# Patient Record
Sex: FEMALE | Race: WHITE | NOT HISPANIC OR LATINO | Employment: FULL TIME | URBAN - METROPOLITAN AREA
[De-identification: names, ages, dates, MRNs, and addresses within clinical notes are randomized per-mention and may not be internally consistent; named-entity substitution may affect disease eponyms.]

---

## 2017-05-12 DIAGNOSIS — M54.9 SPINE PAIN: Primary | ICD-10-CM

## 2017-05-16 ENCOUNTER — TELEPHONE (OUTPATIENT)
Dept: ORTHOPEDICS | Facility: CLINIC | Age: 52
End: 2017-05-16

## 2017-05-16 NOTE — TELEPHONE ENCOUNTER
Spoke to pt regarding appt Wednesday 5/17/17 at 830a with Kim Ghosh PA-C. Pt was informed to arrive on the 2nd floor at 745a, then up to floor 5 to see Kim. Pt verbalized understanding. Phone number was provided for any further questions.    Zoraida MORALES

## 2017-05-17 ENCOUNTER — OFFICE VISIT (OUTPATIENT)
Dept: ORTHOPEDICS | Facility: CLINIC | Age: 52
End: 2017-05-17
Payer: COMMERCIAL

## 2017-05-17 ENCOUNTER — HOSPITAL ENCOUNTER (OUTPATIENT)
Dept: RADIOLOGY | Facility: HOSPITAL | Age: 52
Discharge: HOME | End: 2017-05-17
Attending: ORTHOPAEDIC SURGERY
Payer: COMMERCIAL

## 2017-05-17 VITALS
SYSTOLIC BLOOD PRESSURE: 123 MMHG | HEIGHT: 67 IN | BODY MASS INDEX: 30.27 KG/M2 | WEIGHT: 192.88 LBS | HEART RATE: 73 BPM | DIASTOLIC BLOOD PRESSURE: 87 MMHG

## 2017-05-17 DIAGNOSIS — M54.12 CERVICAL RADICULOPATHY: Primary | ICD-10-CM

## 2017-05-17 DIAGNOSIS — M54.16 LUMBAR RADICULOPATHY: ICD-10-CM

## 2017-05-17 DIAGNOSIS — M54.9 SPINE PAIN: ICD-10-CM

## 2017-05-17 PROCEDURE — 99999 PR PBB SHADOW E&M-EST. PATIENT-LVL III: CPT | Mod: PBBFAC,,, | Performed by: PHYSICIAN ASSISTANT

## 2017-05-17 PROCEDURE — 99204 OFFICE O/P NEW MOD 45 MIN: CPT | Mod: S$GLB,,, | Performed by: PHYSICIAN ASSISTANT

## 2017-05-17 PROCEDURE — 1160F RVW MEDS BY RX/DR IN RCRD: CPT | Mod: S$GLB,,, | Performed by: PHYSICIAN ASSISTANT

## 2017-05-17 PROCEDURE — 72100 X-RAY EXAM L-S SPINE 2/3 VWS: CPT | Mod: 26,,, | Performed by: RADIOLOGY

## 2017-05-17 PROCEDURE — 72120 X-RAY BEND ONLY L-S SPINE: CPT | Mod: TC

## 2017-05-17 PROCEDURE — 72050 X-RAY EXAM NECK SPINE 4/5VWS: CPT | Mod: 26,,, | Performed by: RADIOLOGY

## 2017-05-17 PROCEDURE — 72050 X-RAY EXAM NECK SPINE 4/5VWS: CPT | Mod: TC

## 2017-05-17 PROCEDURE — 72120 X-RAY BEND ONLY L-S SPINE: CPT | Mod: 26,,, | Performed by: RADIOLOGY

## 2017-05-17 RX ORDER — HYDROCODONE BITARTRATE AND ACETAMINOPHEN 10; 325 MG/1; MG/1
TABLET ORAL
COMMUNITY
Start: 2017-02-13 | End: 2020-05-05

## 2017-05-17 RX ORDER — DICLOFENAC SODIUM 75 MG/1
75 TABLET, DELAYED RELEASE ORAL 2 TIMES DAILY
Qty: 60 TABLET | Refills: 1 | Status: SHIPPED | OUTPATIENT
Start: 2017-05-17 | End: 2017-06-16

## 2017-05-17 RX ORDER — DIAZEPAM 2 MG/1
2 TABLET ORAL
Qty: 2 TABLET | Refills: 0 | Status: SHIPPED | OUTPATIENT
Start: 2017-05-17 | End: 2020-05-05

## 2017-05-17 RX ORDER — DICLOFENAC SODIUM 75 MG/1
TABLET, DELAYED RELEASE ORAL
Refills: 2 | COMMUNITY
Start: 2017-04-04 | End: 2017-05-17

## 2017-05-17 NOTE — PROGRESS NOTES
DATE: 5/17/2017  PATIENT: Nina Cuba    Supervising Physician: Ryan Lee M.D.    CHIEF COMPLAINT: neck pain and back pain    HISTORY:  Nina Cuba is a 52 y.o. female who works at [employer] here for initial evaluation of neck and left arm pain (Neck- 7, Arm- 3). The pain has been present for many years.  She also reports back and left posterior leg pain for several years but has progressively worsened over the last year (Back - 5, Leg - 5). The patient describes the pain as aching and sharp.  The pain in the neck is worse with standing and at the end of the day, and improved by laying down and with heat.  The pain in the back is worse with bending and improved by sitting and with heat.  There is associated numbness and tingling in the left leg and left arm to the thumb. There is subjective weakness in the left upper extremity.  She reports dropping things and difficulty with buttons.  She notices the weakness in her left hand about 8 months ago.  Prior treatments have included voltaren, hydrocodone, physical therapy and she reports cervical and lumbar injections but says they were not ESIs because she was afraid to have an ML, but no surgery.    She says she was seen by Dr. London at [facility] who told her she needed a total disc replacement several years ago.  She says she didn't want to have surgery so she stopped seeing him.  She has been self managing her pain since then.     The patient denies myelopathic symptoms such as handwriting changes or difficulty with coins/keys. She reports difficulty with buttons and dropping things.  Denies perineal paresthesias, bowel/bladder dysfunction.    PAST MEDICAL/SURGICAL HISTORY:  Past Medical History:   Diagnosis Date    Back problem      Past Surgical History:   Procedure Laterality Date    TONSILLECTOMY         Medications:   Current Outpatient Prescriptions on File Prior to Visit   Medication Sig Dispense Refill    cyclobenzaprine  "(FLEXERIL) 10 MG tablet Take 1 tablet (10 mg total) by mouth 3 (three) times daily as needed for Muscle spasms. 15 tablet 0    hydrocodone-acetaminophen 5-325mg (NORCO) 5-325 mg per tablet Take 1 tablet by mouth every 6 (six) hours as needed for Pain. 12 tablet 0     No current facility-administered medications on file prior to visit.        Social History:   Social History     Social History    Marital status: Single     Spouse name: N/A    Number of children: N/A    Years of education: N/A     Occupational History    Not on file.     Social History Main Topics    Smoking status: Former Smoker    Smokeless tobacco: Not on file    Alcohol use No    Drug use: Not on file    Sexual activity: Not on file     Other Topics Concern    Not on file     Social History Narrative       REVIEW OF SYSTEMS:  Constitution: Negative. Negative for chills, fever and night sweats.   Cardiovascular: Negative for chest pain and syncope.   Respiratory: Negative for cough and shortness of breath.   Gastrointestinal: See HPI. Negative for nausea/vomiting. Negative for abdominal pain.  Genitourinary: See HPI. Negative for discoloration or dysuria.  Skin: Negative for dry skin, itching and rash.   Hematologic/Lymphatic: Negative for bleeding problem. Does not bruise/bleed easily.   Musculoskeletal: Negative for falls and muscle weakness.   Neurological: See HPI. No seizures.   Endocrine: Negative for polydipsia, polyphagia and polyuria.   Allergic/Immunologic: Negative for hives and persistent infections.     EXAM:  /87 (BP Location: Right arm, Patient Position: Sitting, BP Method: Automatic)  Pulse 73  Ht 5' 7" (1.702 m)  Wt 87.5 kg (192 lb 14.4 oz)  LMP 04/17/2017  BMI 30.21 kg/m2    PHYSICAL EXAMINATION:    General: The patient is a very pleasant 52 y.o. female in no apparent distress, the patient is oriented to person, place and time.  Psych: Normal mood and affect  HEENT: Vision grossly intact, hearing intact to " the spoken word.  Lungs: Respirations unlabored.  Gait: Normal station and gait, no difficulty with toe or heel walk.   Skin: Cervical skin and dorsal lumbar skin negative for rashes, lesions, hairy patches and surgical scars.    Range of motion: Cervical and lumbar range of motion is acceptable. There is mild trapezial tenderness to palpation.  There is mild bilateral lumbar tenderness to palpation.  Spinal Balance: Global saggital and coronal spinal balance acceptable, no significant for scoliosis and kyphosis.  Musculoskeletal: No pain with the range of motion of the bilateral shoulders and elbows. Normal bulk and contour of the bilateral hands.  No pain with the range of motion of the bilateral hips. Mild bilateral trochanteric tenderness to palpation.  Vascular: Bilateral upper and lower extremities warm and well perfused, radial pulses 2+ bilaterally, dorsalis pedis pulses 2+ bilaterally.  Neurological: Normal strength and tone in all major motor groups in the bilateral upper and lower extremities. Normal sensation to light touch in the C5-T1 and L2-S1 dermatomes bilaterally with the exception of decreased sensation in the C6 dermatome on the left.  Deep tendon reflexes symmetric 2++ in the bilateral upper extremities, 2+ in the bilateral lower extremities.  Positive Inverted Radial Reflex and Simental's bilaterally. Negative Babinski bilaterally. Negative straight leg raise bilaterally.     IMAGING:   Today I personally reviewed AP, Lat and Flex/Ex  upright C-spine films that demonstrate straightening of the normal cervical lordosis.  There is moderate disc space narrowing at C5/6.  There is anterolisthesis of C4/5.    AP, Lat and Flex/Ex upright lumbar spine films demonstrate mild degenerative changes of the lumbar spine.     ASSESSMENT/PLAN:    Diagnoses and all orders for this visit:    Cervical radiculopathy  -     MRI Lumbar Spine Without Contrast; Future  -     MRI Cervical Spine Without Contrast;  Future    Lumbar radiculopathy  -     MRI Lumbar Spine Without Contrast; Future  -     MRI Cervical Spine Without Contrast; Future    Other orders  -     diclofenac (VOLTAREN) 75 MG EC tablet; Take 1 tablet (75 mg total) by mouth 2 (two) times daily.  -     diazePAM (VALIUM) 2 MG tablet; Take 1 tablet (2 mg total) by mouth On call Procedure for Anxiety (take 1 30 min prior to procedure.  may take 2nd if needed.).    The patient is having signs and symptoms concerning for cervical myelopathy.  MRI cervical and lumbar spine for further evaluation.  Follow up after the MRIs to discuss results and further treatment including ESIs and possibly surgery.       Return if symptoms worsen or fail to improve.

## 2017-05-26 ENCOUNTER — HOSPITAL ENCOUNTER (EMERGENCY)
Facility: HOSPITAL | Age: 52
Discharge: HOME | End: 2017-05-27
Attending: EMERGENCY MEDICINE
Payer: COMMERCIAL

## 2017-05-26 VITALS
SYSTOLIC BLOOD PRESSURE: 147 MMHG | RESPIRATION RATE: 16 BRPM | HEART RATE: 61 BPM | TEMPERATURE: 98 F | WEIGHT: 185 LBS | OXYGEN SATURATION: 98 % | BODY MASS INDEX: 29.03 KG/M2 | DIASTOLIC BLOOD PRESSURE: 88 MMHG | HEIGHT: 67 IN

## 2017-05-26 DIAGNOSIS — S53.401A: Primary | ICD-10-CM

## 2017-05-26 DIAGNOSIS — M25.521 ELBOW PAIN, RIGHT: ICD-10-CM

## 2017-05-26 DIAGNOSIS — M25.531 FOREARM JOINT PAIN, RIGHT: ICD-10-CM

## 2017-05-26 PROCEDURE — 96372 THER/PROPH/DIAG INJ SC/IM: CPT

## 2017-05-26 PROCEDURE — 63600175 PHARM REV CODE 636 W HCPCS: Performed by: PHYSICIAN ASSISTANT

## 2017-05-26 PROCEDURE — 99283 EMERGENCY DEPT VISIT LOW MDM: CPT | Mod: 25

## 2017-05-26 RX ORDER — KETOROLAC TROMETHAMINE 30 MG/ML
30 INJECTION, SOLUTION INTRAMUSCULAR; INTRAVENOUS
Status: COMPLETED | OUTPATIENT
Start: 2017-05-26 | End: 2017-05-26

## 2017-05-26 RX ADMIN — KETOROLAC TROMETHAMINE 30 MG: 30 INJECTION, SOLUTION INTRAMUSCULAR at 11:05

## 2017-05-27 RX ORDER — MELOXICAM 7.5 MG/1
7.5 TABLET ORAL DAILY
Qty: 30 TABLET | Refills: 0 | Status: SHIPPED | OUTPATIENT
Start: 2017-05-27 | End: 2020-05-05

## 2017-05-27 NOTE — ED TRIAGE NOTES
"Pt reports was walking turned a corner and "went flying", slipped in detergent or something, landed on right side, states pain mostly in arm and hand. Landed on elbow  "

## 2017-05-27 NOTE — ED PROVIDER NOTES
"Encounter Date: 5/26/2017    SCRIBE #1 NOTE: I, Marvin Garza, am scribing for, and in the presence of,  LAUREL Herman. I have scribed the following portions of the note - Other sections scribed: HPI and ROS.       History     Chief Complaint   Patient presents with    Arm Injury     Pt reports "I slipped on soap on the floor at [employer] around 2015 tonight, causing a fall and pain to right side, judy my right forearm"     Review of patient's allergies indicates:  No Known Allergies  CC: Arm Pain     HPI: This 52 y.o F with PMHx of back pain presents to the ED c/o acute onset of constant and severe (9/10) R forearm pain secondary to a slip and fall at approximately 2015 today. The pt states she slipped on soap at [employer]. The pt states "I can't get comfortable. It feels like pressure." Pt's pain is alleviated when she elevates it with her hand. Pt denies head trauma and LOC. No prior tx.          Past Medical History:   Diagnosis Date    Back problem      Past Surgical History:   Procedure Laterality Date    TONSILLECTOMY       History reviewed. No pertinent family history.  Social History   Substance Use Topics    Smoking status: Former Smoker    Smokeless tobacco: Not on file    Alcohol use No     Review of Systems   Constitutional: Negative for fever.   HENT: Negative for sore throat.    Respiratory: Negative for shortness of breath.    Cardiovascular: Negative for chest pain.   Gastrointestinal: Negative for nausea.   Genitourinary: Negative for dysuria.   Musculoskeletal: Negative for back pain.        (+) R forearm pain   Skin: Negative for rash.   Neurological: Negative for weakness.        (-) head trauma  (-) LOC   Hematological: Does not bruise/bleed easily.       Physical Exam     Initial Vitals [05/26/17 2148]   BP Pulse Resp Temp SpO2   (!) 147/88 61 16 98.2 °F (36.8 °C) 98 %     Physical Exam    Constitutional: She appears well-developed and well-nourished.   Cardiovascular: Normal rate, " regular rhythm, normal heart sounds and intact distal pulses.   Pulmonary/Chest: Breath sounds normal.   Musculoskeletal: She exhibits tenderness (right elbow and right forearm).   Decreased range of motion of right elbow and full   Neurological: She is alert and oriented to person, place, and time. She has normal strength.   Skin: Skin is warm.   Psychiatric: She has a normal mood and affect.         ED Course   Procedures  Labs Reviewed - No data to display       X-Rays:   Independently Interpreted Readings:   Other Readings:  Right forearm and elbow x-ray negative for fracture or dislocation    Medical Decision Making:   Initial Assessment:   52-year-old female presents complaining of right forearm and elbow pain status post fall in [place] today.  Patient states she slipped on liquid soap.  Decreased range of motion of right elbow and forearm.  Patient has tenderness to palpation.  Right elbow and right forearm x-rays negative for fracture.  I suspect patient has a sprain.  Patient given a sling for comfort.  Patient given Toradol 30 mg IM in ED.  I will discharge patient home with Mobic.  Patient instructed to follow up with orthopedic.  Patient stable for discharge.            Scribe Attestation:   Scribe #1: I performed the above scribed service and the documentation accurately describes the services I performed. I attest to the accuracy of the note.    Attending Attestation:     Physician Attestation Statement for NP/PA:   I discussed this assessment and plan of this patient with the NP/PA, but I did not personally examine the patient. The face to face encounter was performed by the NP/PA.    Other NP/PA Attestation Additions:      Medical Decision Making: Agree with assessment and plan       Physician Attestation for Scribe:  Physician Attestation Statement for Scribe #1: I, LAUREL Herman, reviewed documentation, as scribed by Marvin Garza in my presence, and it is both accurate and complete.                  ED Course     Clinical Impression:   The primary encounter diagnosis was Sprain elbow/forearm, right, initial encounter. Diagnoses of Elbow pain, right and Forearm joint pain, right were also pertinent to this visit.    Disposition:   Disposition: Discharged  Condition: Stable       LAUREL Forrester  05/27/17 0056       LAUREL Forrester  05/27/17 0057       Rafael Wesis MD  05/27/17 0106

## 2018-07-23 DIAGNOSIS — Z12.39 SCREENING BREAST EXAMINATION: Primary | ICD-10-CM

## 2018-07-23 DIAGNOSIS — N95.9 MENOPAUSAL PROBLEM: ICD-10-CM

## 2018-08-14 ENCOUNTER — HOSPITAL ENCOUNTER (OUTPATIENT)
Dept: RADIOLOGY | Facility: HOSPITAL | Age: 53
Discharge: HOME | End: 2018-08-14
Attending: INTERNAL MEDICINE
Payer: COMMERCIAL

## 2018-08-14 VITALS — HEIGHT: 67 IN | BODY MASS INDEX: 29.03 KG/M2 | WEIGHT: 185 LBS

## 2018-08-14 DIAGNOSIS — Z12.39 SCREENING BREAST EXAMINATION: ICD-10-CM

## 2018-08-14 DIAGNOSIS — N95.9 MENOPAUSAL PROBLEM: ICD-10-CM

## 2018-08-14 PROCEDURE — 77067 SCR MAMMO BI INCL CAD: CPT | Mod: 26,,, | Performed by: RADIOLOGY

## 2018-08-14 PROCEDURE — 77063 BREAST TOMOSYNTHESIS BI: CPT | Mod: 26,,, | Performed by: RADIOLOGY

## 2018-08-14 PROCEDURE — 77080 DXA BONE DENSITY AXIAL: CPT | Mod: 26,,, | Performed by: RADIOLOGY

## 2018-08-14 PROCEDURE — 77063 BREAST TOMOSYNTHESIS BI: CPT | Mod: TC

## 2018-08-14 PROCEDURE — 77080 DXA BONE DENSITY AXIAL: CPT | Mod: TC

## 2020-04-13 ENCOUNTER — OFFICE VISIT (OUTPATIENT)
Dept: GASTROENTEROLOGY | Facility: CLINIC | Age: 55
End: 2020-04-13
Payer: COMMERCIAL

## 2020-04-13 ENCOUNTER — TELEPHONE (OUTPATIENT)
Dept: INTERNAL MEDICINE | Facility: CLINIC | Age: 55
End: 2020-04-13

## 2020-04-13 ENCOUNTER — OFFICE VISIT (OUTPATIENT)
Dept: INTERNAL MEDICINE | Facility: CLINIC | Age: 55
End: 2020-04-13
Payer: COMMERCIAL

## 2020-04-13 DIAGNOSIS — R13.19 ESOPHAGEAL DYSPHAGIA: Primary | ICD-10-CM

## 2020-04-13 DIAGNOSIS — R13.19 ESOPHAGEAL DYSPHAGIA: ICD-10-CM

## 2020-04-13 DIAGNOSIS — R10.13 DYSPEPSIA: ICD-10-CM

## 2020-04-13 PROCEDURE — 99442 PR PHYSICIAN TELEPHONE EVALUATION 11-20 MIN: CPT | Mod: 95,,, | Performed by: INTERNAL MEDICINE

## 2020-04-13 PROCEDURE — 99442 PR PHYSICIAN TELEPHONE EVALUATION 11-20 MIN: ICD-10-PCS | Mod: 95,,, | Performed by: INTERNAL MEDICINE

## 2020-04-13 NOTE — TELEPHONE ENCOUNTER
----- Message from Deandra Donald sent at 4/13/2020  9:18 AM CDT -----  Contact: [phone]  Patient states she is ready for her appointment. Please advise

## 2020-04-13 NOTE — LETTER
April 23, 2020      Manjeet Hernandez MD  [address] [facility] [address]  [place] [address]           [facility]  [address], [address]  [facility] [address]  Phone: [phone]          Patient: Nina Cuba   MR Number: [id number]   YOB: 1965   Date of Visit: 4/13/2020       Dear Dr. Manjeet Hernandez:    Thank you for referring Nina Cuba to me for evaluation. Attached you will find relevant portions of my assessment and plan of care.    If you have questions, please do not hesitate to call me. I look forward to following Nina Cuba along with you.    Sincerely,    Arnulfo Mendoza MD    Enclosure  CC:  No Recipients    If you would like to receive this communication electronically, please contact [email or web address] or [phone] to request more information on [employer] Link access.    For providers and/or their staff who would like to refer a patient to [facility], please contact us through our one-stop-shop provider referral line, [facility] , at [phone].    If you feel you have received this communication in error or would no longer like to receive these types of communications, please e-mail [email or web address]

## 2020-04-13 NOTE — PROGRESS NOTES
Subjective:       Patient ID: Nina Cuba is a 55 y.o. female.    Chief Complaint: No chief complaint on file.    Patient new to me done as a virtual audio visit because patient couldn't do a visual virtual visit.    Complains of progressive problem with dysphagia.  Describes solid food problems.  Food gets stuck in esophagus and she regurgitates the food or partially digested food.  She can tolerate fluids and soft food like yogurt.  No N/V.  No real abdominal pain.  No sign of blood or hematemasis.    Had similar problem in past for which she was treated in GI [facility] with Dr. Ward Serna.  Had a endoscopy which showed erosions.      She has been taking propantazole regularly with no help with her dysphagia.  No sign of melena.    Review of Systems   Constitutional: Negative for appetite change, chills, fatigue and fever.   Cardiovascular: Negative.    Gastrointestinal: Negative for abdominal distention, abdominal pain, blood in stool, constipation, diarrhea, nausea and vomiting.   Genitourinary: Negative.    Neurological: Negative for dizziness, light-headedness and headaches.       Objective:      Physical Exam    Assessment:   1. Esophageal dysphagia.  2. Dyspepsia.    Plan:   After lenghthy discussion on phone, will refer patient to GI [facility] for further evaluation.  She will continue the acid blocker for now.  Discuss liquid diet with fluids, juices, soups, yogurt.    Length of session was at least 12 minutes.

## 2020-04-13 NOTE — TELEPHONE ENCOUNTER
Spoke with pt to advise we have not yet sent a message to the specialist to schedule her.    Verbalized understanding that she will receive a call soon.

## 2020-04-13 NOTE — TELEPHONE ENCOUNTER
----- Message from Elizabeth Snow sent at 4/13/2020 10:25 AM CDT -----  Contact: Pt self Mobile/Home [phone]   Patient is returning a phone call.  Who left a message for the patient: Javid Romero LPN    Does patient know what this is regarding:  A message from Javid Romero LPN

## 2020-04-13 NOTE — PROGRESS NOTES
Subjective:       Patient ID: Nina Cuba is a 55 y.o. female.    Chief Complaint: Dysphagia    This is a 55-year-old female who presents with esophageal dysphagia.  She has noted the symptom intermittently for years but worsened for more frequently over the last several months.  Which she describes as solid food dysphagia with a sensation of it sticking in the substernal region.  Last for minutes at a time, moderate severity.  No chest pain, palpitations.  No nasal regurgitation, cough while eating.  No other exacerbating or relieving factors.  No or pharyngeal complaints.  No difficulty with liquids.  No unintentional weight loss, melena.  The past she has been told she has had ulcers in the esophagus under last upper endoscopy was over 20 years ago.    The following portions of the patient's history were reviewed and updated as appropriate: allergies, current medications, past family history, past medical history, past social history, past surgical history and problem list.    (Portions of this note were dictated using voice recognition software and may contain dictation related errors in spelling/grammar/syntax not found on text review)  HPI  Review of Systems      Objective:      Physical Exam      Labs/imaging; reviewed  Assessment:       1. Esophageal dysphagia    2. Dyspepsia        Plan:   1. Continue PPI  2. EGD with possible dilation after resolution    The patient location is: out of office  The chief complaint leading to consultation is dysphagia    Visit type: audio only    15  minutes of total time spent on the encounter, which includes face to face time and non-face to face time preparing to see the patient (eg, review of tests), Obtaining and/or reviewing separately obtained history, Documenting clinical information in the electronic or other health record, Independently interpreting results (not separately reported) and communicating results to the patient/family/caregiver, or Care  coordination (not separately reported).         Each patient to whom he or she provides medical services by telemedicine is:  (1) informed of the relationship between the physician and patient and the respective role of any other health care provider with respect to management of the patient; and (2) notified that he or she may decline to receive medical services by telemedicine and may withdraw from such care at any time.

## 2020-04-13 NOTE — PATIENT INSTRUCTIONS
1. Referral to GI med for further evaluation.  2. Continue acid blocker as now.  3. Liquid diet discussed.

## 2020-05-05 ENCOUNTER — LAB VISIT (OUTPATIENT)
Dept: LAB | Facility: HOSPITAL | Age: 55
End: 2020-05-05
Attending: INTERNAL MEDICINE
Payer: COMMERCIAL

## 2020-05-05 ENCOUNTER — OFFICE VISIT (OUTPATIENT)
Dept: INTERNAL MEDICINE | Facility: CLINIC | Age: 55
End: 2020-05-05
Payer: COMMERCIAL

## 2020-05-05 VITALS
WEIGHT: 198 LBS | DIASTOLIC BLOOD PRESSURE: 68 MMHG | TEMPERATURE: 99 F | BODY MASS INDEX: 30.01 KG/M2 | HEART RATE: 77 BPM | SYSTOLIC BLOOD PRESSURE: 102 MMHG | HEIGHT: 68 IN | RESPIRATION RATE: 18 BRPM

## 2020-05-05 DIAGNOSIS — K21.9 GASTROESOPHAGEAL REFLUX DISEASE, ESOPHAGITIS PRESENCE NOT SPECIFIED: ICD-10-CM

## 2020-05-05 DIAGNOSIS — Z00.00 ANNUAL PHYSICAL EXAM: Primary | ICD-10-CM

## 2020-05-05 DIAGNOSIS — Z12.31 ENCOUNTER FOR SCREENING MAMMOGRAM FOR BREAST CANCER: ICD-10-CM

## 2020-05-05 DIAGNOSIS — Z00.00 ANNUAL PHYSICAL EXAM: ICD-10-CM

## 2020-05-05 DIAGNOSIS — E66.9 OBESITY (BMI 30-39.9): ICD-10-CM

## 2020-05-05 LAB
ALBUMIN SERPL BCP-MCNC: 4.1 G/DL (ref 3.5–5.2)
ALP SERPL-CCNC: 86 U/L (ref 55–135)
ALT SERPL W/O P-5'-P-CCNC: 26 U/L (ref 10–44)
ANION GAP SERPL CALC-SCNC: 10 MMOL/L (ref 8–16)
AST SERPL-CCNC: 21 U/L (ref 10–40)
BASOPHILS # BLD AUTO: 0.06 K/UL (ref 0–0.2)
BASOPHILS NFR BLD: 0.7 % (ref 0–1.9)
BILIRUB SERPL-MCNC: 0.2 MG/DL (ref 0.1–1)
BUN SERPL-MCNC: 27 MG/DL (ref 6–20)
CALCIUM SERPL-MCNC: 9 MG/DL (ref 8.7–10.5)
CHLORIDE SERPL-SCNC: 107 MMOL/L (ref 95–110)
CHOLEST SERPL-MCNC: 190 MG/DL (ref 120–199)
CHOLEST/HDLC SERPL: 2.8 {RATIO} (ref 2–5)
CO2 SERPL-SCNC: 23 MMOL/L (ref 23–29)
CREAT SERPL-MCNC: 0.8 MG/DL (ref 0.5–1.4)
DIFFERENTIAL METHOD: ABNORMAL
EOSINOPHIL # BLD AUTO: 0.2 K/UL (ref 0–0.5)
EOSINOPHIL NFR BLD: 2.7 % (ref 0–8)
ERYTHROCYTE [DISTWIDTH] IN BLOOD BY AUTOMATED COUNT: 16.5 % (ref 11.5–14.5)
EST. GFR  (AFRICAN AMERICAN): >60 ML/MIN/1.73 M^2
EST. GFR  (NON AFRICAN AMERICAN): >60 ML/MIN/1.73 M^2
ESTIMATED AVG GLUCOSE: 117 MG/DL (ref 68–131)
GLUCOSE SERPL-MCNC: 94 MG/DL (ref 70–110)
HBA1C MFR BLD HPLC: 5.7 % (ref 4–5.6)
HCT VFR BLD AUTO: 39.3 % (ref 37–48.5)
HDLC SERPL-MCNC: 67 MG/DL (ref 40–75)
HDLC SERPL: 35.3 % (ref 20–50)
HGB BLD-MCNC: 11.6 G/DL (ref 12–16)
IMM GRANULOCYTES # BLD AUTO: 0.02 K/UL (ref 0–0.04)
IMM GRANULOCYTES NFR BLD AUTO: 0.2 % (ref 0–0.5)
LDLC SERPL CALC-MCNC: 109.2 MG/DL (ref 63–159)
LYMPHOCYTES # BLD AUTO: 2.4 K/UL (ref 1–4.8)
LYMPHOCYTES NFR BLD: 29.5 % (ref 18–48)
MCH RBC QN AUTO: 26.4 PG (ref 27–31)
MCHC RBC AUTO-ENTMCNC: 29.5 G/DL (ref 32–36)
MCV RBC AUTO: 89 FL (ref 82–98)
MONOCYTES # BLD AUTO: 0.6 K/UL (ref 0.3–1)
MONOCYTES NFR BLD: 7.4 % (ref 4–15)
NEUTROPHILS # BLD AUTO: 4.8 K/UL (ref 1.8–7.7)
NEUTROPHILS NFR BLD: 59.5 % (ref 38–73)
NONHDLC SERPL-MCNC: 123 MG/DL
NRBC BLD-RTO: 0 /100 WBC
PLATELET # BLD AUTO: 377 K/UL (ref 150–350)
PMV BLD AUTO: 11.3 FL (ref 9.2–12.9)
POTASSIUM SERPL-SCNC: 4.1 MMOL/L (ref 3.5–5.1)
PROT SERPL-MCNC: 7.4 G/DL (ref 6–8.4)
RBC # BLD AUTO: 4.4 M/UL (ref 4–5.4)
SODIUM SERPL-SCNC: 140 MMOL/L (ref 136–145)
TRIGL SERPL-MCNC: 69 MG/DL (ref 30–150)
TSH SERPL DL<=0.005 MIU/L-ACNC: 1.67 UIU/ML (ref 0.4–4)
WBC # BLD AUTO: 8.08 K/UL (ref 3.9–12.7)

## 2020-05-05 PROCEDURE — 99999 PR PBB SHADOW E&M-EST. PATIENT-LVL IV: ICD-10-PCS | Mod: PBBFAC,,, | Performed by: INTERNAL MEDICINE

## 2020-05-05 PROCEDURE — 90471 TD VACCINE GREATER THAN OR EQUAL TO 7YO PRESERVATIVE FREE IM: ICD-10-PCS | Mod: S$GLB,,, | Performed by: INTERNAL MEDICINE

## 2020-05-05 PROCEDURE — 99396 PR PREVENTIVE VISIT,EST,40-64: ICD-10-PCS | Mod: 25,S$GLB,, | Performed by: INTERNAL MEDICINE

## 2020-05-05 PROCEDURE — 90471 IMMUNIZATION ADMIN: CPT | Mod: S$GLB,,, | Performed by: INTERNAL MEDICINE

## 2020-05-05 PROCEDURE — 36415 COLL VENOUS BLD VENIPUNCTURE: CPT | Mod: PO

## 2020-05-05 PROCEDURE — 90714 TD VACC NO PRESV 7 YRS+ IM: CPT | Mod: S$GLB,,, | Performed by: INTERNAL MEDICINE

## 2020-05-05 PROCEDURE — 85025 COMPLETE CBC W/AUTO DIFF WBC: CPT

## 2020-05-05 PROCEDURE — 99999 PR PBB SHADOW E&M-EST. PATIENT-LVL IV: CPT | Mod: PBBFAC,,, | Performed by: INTERNAL MEDICINE

## 2020-05-05 PROCEDURE — 84443 ASSAY THYROID STIM HORMONE: CPT

## 2020-05-05 PROCEDURE — 83036 HEMOGLOBIN GLYCOSYLATED A1C: CPT

## 2020-05-05 PROCEDURE — 99396 PREV VISIT EST AGE 40-64: CPT | Mod: 25,S$GLB,, | Performed by: INTERNAL MEDICINE

## 2020-05-05 PROCEDURE — 90714 TD VACCINE GREATER THAN OR EQUAL TO 7YO PRESERVATIVE FREE IM: ICD-10-PCS | Mod: S$GLB,,, | Performed by: INTERNAL MEDICINE

## 2020-05-05 PROCEDURE — 80061 LIPID PANEL: CPT

## 2020-05-05 PROCEDURE — 80053 COMPREHEN METABOLIC PANEL: CPT

## 2020-05-05 RX ORDER — PANTOPRAZOLE SODIUM 40 MG/1
40 TABLET, DELAYED RELEASE ORAL DAILY
COMMUNITY
Start: 2020-03-15 | End: 2021-03-19 | Stop reason: SDUPTHER

## 2020-05-05 RX ORDER — TIZANIDINE 4 MG/1
4 TABLET ORAL EVERY 8 HOURS
COMMUNITY
Start: 2020-03-28 | End: 2022-10-26

## 2020-05-05 NOTE — PROGRESS NOTES
Subjective:       Patient ID: Nina Cuba is a 55 y.o. female.    Chief Complaint: Annual Exam and Establish Care    HPI   55 y.o. Female here for annual exam.     Vaccines: Influenza (declined); Tetanus (2020)  Eye exam: needs  Mammogram: 8/18  Gyn exam: needs  Colonoscopy: needs    Exercise: no  Diet: regular  LMP: menopausal    Past Medical History:  No date: Back problem  No date: GERD (gastroesophageal reflux disease)  No date: Obesity (BMI 30-39.9)  Past Surgical History:  No date: TONSILLECTOMY  Social History    Socioeconomic History      Marital status: Single      Spouse name: Not on file      Number of children: 0      Years of education: Not on file      Highest education level: Not on file    Occupational History      Not on file    Social Needs      Financial resource strain: Not on file      Food insecurity:        Worry: Not on file        Inability: Not on file      Transportation needs:        Medical: Not on file        Non-medical: Not on file    Tobacco Use      Smoking status: Former Smoker      Smokeless tobacco: Never Used    Substance and Sexual Activity      Alcohol use: Yes        Comment: rare      Drug use: Never      Sexual activity: Yes        Partners: Male    Lifestyle      Physical activity:        Days per week: Not on file        Minutes per session: Not on file      Stress: Not on file    Relationships      Social connections:        Talks on phone: Not on file        Gets together: Not on file        Attends Protestant service: Not on file        Active member of club or organization: Not on file        Attends meetings of clubs or organizations: Not on file        Relationship status: Not on file    Other Topics      Concerns:        Not on file    Social History Narrative      Associate at [employer]     Review of patient's allergies indicates:  No Known Allergies  Nina Cuba had no medications administered during this visit.    Review of Systems    Constitutional: Negative for activity change, appetite change, chills, diaphoresis, fatigue, fever and unexpected weight change.   HENT: Negative for congestion, mouth sores, postnasal drip, rhinorrhea, sinus pressure, sneezing, sore throat, trouble swallowing and voice change.    Eyes: Negative for pain, discharge and visual disturbance.   Respiratory: Negative for cough, shortness of breath and wheezing.    Cardiovascular: Negative for chest pain, palpitations and leg swelling.   Gastrointestinal: Negative for abdominal pain, blood in stool, constipation, diarrhea, nausea and vomiting.   Endocrine: Negative for cold intolerance and heat intolerance.   Genitourinary: Negative for difficulty urinating, dysuria, frequency, hematuria and urgency.   Musculoskeletal: Negative for arthralgias and myalgias.   Skin: Negative for rash and wound.   Allergic/Immunologic: Negative for environmental allergies and food allergies.   Neurological: Negative for dizziness, tremors, seizures, syncope, weakness, light-headedness and headaches.   Hematological: Negative for adenopathy. Does not bruise/bleed easily.   Psychiatric/Behavioral: Negative for confusion and sleep disturbance. The patient is not nervous/anxious.        Objective:      Physical Exam   Constitutional: She is oriented to person, place, and time. She appears well-developed and well-nourished. No distress.   HENT:   Head: Normocephalic and atraumatic.   Right Ear: External ear normal.   Left Ear: External ear normal.   Nose: Nose normal.   Mouth/Throat: Oropharynx is clear and moist. No oropharyngeal exudate.   Eyes: Pupils are equal, round, and reactive to light. Conjunctivae and EOM are normal. Right eye exhibits no discharge. Left eye exhibits no discharge. No scleral icterus.   Neck: Neck supple. No JVD present. No thyromegaly present.   Cardiovascular: Normal rate, regular rhythm, normal heart sounds and intact distal pulses.   No murmur  heard.  Pulmonary/Chest: Effort normal and breath sounds normal. No respiratory distress. She has no wheezes. She has no rales. She exhibits no tenderness.   Abdominal: Soft. Bowel sounds are normal. She exhibits no distension. There is no tenderness. There is no guarding.   Musculoskeletal: She exhibits no edema.   Lymphadenopathy:     She has no cervical adenopathy.   Neurological: She is alert and oriented to person, place, and time. No cranial nerve deficit. Coordination normal.   Skin: Skin is warm and dry. No rash noted. She is not diaphoretic. No pallor.   Psychiatric: She has a normal mood and affect. Judgment normal.   Nursing note and vitals reviewed.      Assessment:       1. Annual physical exam    2. Obesity (BMI 30-39.9)    3. Gastroesophageal reflux disease, esophagitis presence not specified    4. Encounter for screening mammogram for breast cancer        Plan:    1. Blood work ordered       Vaccines: Influenza (declined); Tetanus (2020)       Eye exam: needs       Mammogram: 8/18       Gyn exam: needs       Colonoscopy: needs   2. Obesity- diet/exercise stressed    3. GERD- stable on Protonix 40 mg daily   4. Mammo ordered   5. F/u in 1 yr

## 2020-05-12 ENCOUNTER — PATIENT OUTREACH (OUTPATIENT)
Dept: ADMINISTRATIVE | Facility: OTHER | Age: 55
End: 2020-05-12

## 2020-05-12 NOTE — PROGRESS NOTES
Care Everywhere: updated  Immunization: updated  Health Maintenance: updated  Media Review: reviewed for possible outside colon cancer report  Legacy Review: n/a  Order placed: n/a  Upcoming appts:mammogram 6.2.2020  Colonoscopy case request 5.5.2020

## 2020-05-15 ENCOUNTER — OFFICE VISIT (OUTPATIENT)
Dept: OBSTETRICS AND GYNECOLOGY | Facility: CLINIC | Age: 55
End: 2020-05-15
Payer: COMMERCIAL

## 2020-05-15 VITALS
SYSTOLIC BLOOD PRESSURE: 122 MMHG | BODY MASS INDEX: 30.12 KG/M2 | DIASTOLIC BLOOD PRESSURE: 86 MMHG | WEIGHT: 198.75 LBS | HEIGHT: 68 IN

## 2020-05-15 DIAGNOSIS — Z00.00 ANNUAL PHYSICAL EXAM: ICD-10-CM

## 2020-05-15 DIAGNOSIS — Z01.419 ENCOUNTER FOR GYNECOLOGICAL EXAMINATION WITHOUT ABNORMAL FINDING: Primary | ICD-10-CM

## 2020-05-15 DIAGNOSIS — E66.9 OBESITY, UNSPECIFIED CLASSIFICATION, UNSPECIFIED OBESITY TYPE, UNSPECIFIED WHETHER SERIOUS COMORBIDITY PRESENT: ICD-10-CM

## 2020-05-15 DIAGNOSIS — N95.1 MENOPAUSAL SYMPTOMS: ICD-10-CM

## 2020-05-15 PROCEDURE — 99999 PR PBB SHADOW E&M-EST. PATIENT-LVL III: CPT | Mod: PBBFAC,,, | Performed by: OBSTETRICS & GYNECOLOGY

## 2020-05-15 PROCEDURE — 99999 PR PBB SHADOW E&M-EST. PATIENT-LVL III: ICD-10-PCS | Mod: PBBFAC,,, | Performed by: OBSTETRICS & GYNECOLOGY

## 2020-05-15 PROCEDURE — 99386 PREV VISIT NEW AGE 40-64: CPT | Mod: S$GLB,,, | Performed by: OBSTETRICS & GYNECOLOGY

## 2020-05-15 PROCEDURE — 88175 CYTOPATH C/V AUTO FLUID REDO: CPT

## 2020-05-15 PROCEDURE — 99386 PR PREVENTIVE VISIT,NEW,40-64: ICD-10-PCS | Mod: S$GLB,,, | Performed by: OBSTETRICS & GYNECOLOGY

## 2020-05-15 RX ORDER — ESTRADIOL 1 MG/1
1 TABLET ORAL DAILY
Qty: 30 TABLET | Refills: 11 | Status: SHIPPED | OUTPATIENT
Start: 2020-05-15 | End: 2020-07-05

## 2020-05-15 RX ORDER — PROGESTERONE 100 MG/1
100 CAPSULE ORAL NIGHTLY
Qty: 30 CAPSULE | Refills: 11 | Status: SHIPPED | OUTPATIENT
Start: 2020-05-15 | End: 2021-05-14

## 2020-05-15 NOTE — PROGRESS NOTES
CC: Well woman exam    Nina Cuba is a 55 y.o. female  presents for a well woman exam.  LMP: Patient's last menstrual period was 2018..  She is having night sweats and worsening hot   Flashes.  NO Cycle for a few years.  She will be considering medication      Past Medical History:   Diagnosis Date    Back problem     GERD (gastroesophageal reflux disease)     Obesity (BMI 30-39.9)      Past Surgical History:   Procedure Laterality Date    TONSILLECTOMY       Social History     Socioeconomic History    Marital status: Single     Spouse name: Not on file    Number of children: 0    Years of education: Not on file    Highest education level: Not on file   Occupational History    Not on file   Social Needs    Financial resource strain: Not on file    Food insecurity:     Worry: Not on file     Inability: Not on file    Transportation needs:     Medical: Not on file     Non-medical: Not on file   Tobacco Use    Smoking status: Former Smoker    Smokeless tobacco: Never Used   Substance and Sexual Activity    Alcohol use: Yes     Comment: rare    Drug use: Never    Sexual activity: Yes     Partners: Male   Lifestyle    Physical activity:     Days per week: Not on file     Minutes per session: Not on file    Stress: Not on file   Relationships    Social connections:     Talks on phone: Not on file     Gets together: Not on file     Attends Restorationist service: Not on file     Active member of club or organization: Not on file     Attends meetings of clubs or organizations: Not on file     Relationship status: Not on file   Other Topics Concern    Not on file   Social History Narrative    Associate at [employer]      Family History   Problem Relation Age of Onset    Diabetes Mother     Hypertension Mother     Lymphoma Father     Kidney cancer Brother     Heart disease Maternal Grandfather     Stroke Neg Hx      OB History        0    Para   0    Term   0      "  0    AB   0    Living   0       SAB   0    TAB   0    Ectopic   0    Multiple   0    Live Births   0                 /86   Ht 5' 7.5" (1.715 m)   Wt 90.2 kg (198 lb 11.9 oz)   LMP 08/09/2018   BMI 30.67 kg/m²       ROS:    ROS:  GENERAL: Denies weight gain or weight loss. Feeling well overall.   SKIN: Denies rash or lesions.   HEAD: Denies head injury or headache.   NODES: Denies enlarged lymph nodes.   CHEST: Denies chest pain or shortness of breath.   CARDIOVASCULAR: Denies palpitations or left sided chest pain.   ABDOMEN: No abdominal pain, constipation, diarrhea, nausea, vomiting or rectal bleeding.   URINARY: No frequency, dysuria, hematuria, or burning on urination.  REPRODUCTIVE: See HPI.   BREASTS: The patient performs breast self-examination and denies pain, lumps, or nipple discharge.   HEMATOLOGIC: No easy bruisability or excessive bleeding.   MUSCULOSKELETAL: Denies joint pain or swelling.   NEUROLOGIC: Denies syncope or weakness.   PSYCHIATRIC: Denies depression, anxiety or mood swings.    PHYSICAL EXAM:    APPEARANCE: Well nourished, well developed, in no acute distress.  AFFECT: WNL, alert and oriented x 3  SKIN: No acne or hirsutism  NECK: Neck symmetric without masses or thyromegaly  NODES: No inguinal, cervical, axillary, or femoral lymph node enlargement  CHEST: Good respiratory effect  ABDOMEN: Soft.  No tenderness or masses.  No hepatosplenomegaly.  No hernias.  BREASTS: Symmetrical, no skin changes or visible lesions.  No palpable masses, nipple discharge bilaterally.  PELVIC: Normal external genitalia without lesions.  Normal hair distribution.  Adequate perineal body, normal urethral meatus.  Vagina moist and well rugated without lesions or discharge.  Cervix pink, without lesions, discharge or tenderness.  No significant cystocele or rectocele.  Bimanual exam shows uterus to be normal size, regular, mobile and nontender.  Adnexa without masses or tenderness.    RECTAL: " Rectovaginal exam confirms above with normal sphincter tone, no masses.  EXTREMITIES: No edema.    diagnosis      ICD-10-CM ICD-9-CM    1. Encounter for gynecological examination without abnormal finding Z01.419 V72.31 Liquid-Based Pap Smear, Screening   2. Annual physical exam Z00.00 V70.0 Ambulatory referral/consult to Obstetrics / Gynecology   3. Menopausal symptoms N95.1 627.2 estradioL (ESTRACE) 1 MG tablet      progesterone (PROMETRIUM) 100 MG capsule   4. Obesity, unspecified classification, unspecified obesity type, unspecified whether serious comorbidity present E66.9 278.00      A full discussion of the benefit-risk ratio of hormonal replacement therapy was carried out. Improvement in vasomotor and other climacteric symptoms is discussed, including possible improvements in sleep and mood. Reduction of risk for osteoporosis was explained. We discussed the study data showing increased risk of thrombo-embolic events such as myocardial infarction, stroke and also possibly breast cancer with estrogen replacement, and how this might affect her. The range of side effects such as breast tenderness, weight gain and including possible increases in lifetime risk of breast cancer and possible thrombotic complications was discussed. We also discussed ACOG's recommendation to use hormone replacement therapy for the relief of hot flashes alone and to be on the lowest dose possible for the shortest amount of time.  Alternative such as herbal and soy-based products were reviewed. All of her questions about this therapy were answered.      Patient was counseled today on A.C.S. Pap guidelines and recommendations for yearly pelvic exams, mammograms and monthly self breast exams; to see her PCP for other health maintenance.     Follow up in about 1 year (around 5/15/2021), or if symptoms worsen or fail to improve.

## 2020-05-15 NOTE — LETTER
May 15, 2020      Tomy Jackson, DO  [address] [facility]  [place] [address]           [place] [facility]  [address]  [place] [address]  Phone: [phone]  Fax: [phone]          Patient: Nina Cuba   MR Number: [id number]   YOB: 1965   Date of Visit: 5/15/2020       Dear Dr. Tomy Jackson:    Thank you for referring Nina Cuba to me for evaluation. Attached you will find relevant portions of my assessment and plan of care.    If you have questions, please do not hesitate to call me. I look forward to following Nina Cuba along with you.    Sincerely,    Carolina Jeong MD    Enclosure  CC:  No Recipients    If you would like to receive this communication electronically, please contact [email or web address] or [phone] to request more information on [employer] Link access.    For providers and/or their staff who would like to refer a patient to [facility], please contact us through our one-stop-shop provider referral line, [facility] [place], at [phone].    If you feel you have received this communication in error or would no longer like to receive these types of communications, please e-mail [email or web address]

## 2020-05-19 LAB
FINAL PATHOLOGIC DIAGNOSIS: NORMAL
Lab: NORMAL

## 2020-05-21 ENCOUNTER — HOSPITAL ENCOUNTER (OUTPATIENT)
Dept: RADIOLOGY | Facility: HOSPITAL | Age: 55
Discharge: HOME | End: 2020-05-21
Attending: INTERNAL MEDICINE
Payer: COMMERCIAL

## 2020-05-21 DIAGNOSIS — Z12.31 ENCOUNTER FOR SCREENING MAMMOGRAM FOR BREAST CANCER: ICD-10-CM

## 2020-05-21 PROCEDURE — 77063 BREAST TOMOSYNTHESIS BI: CPT | Mod: 26,,, | Performed by: RADIOLOGY

## 2020-05-21 PROCEDURE — 77067 SCR MAMMO BI INCL CAD: CPT | Mod: TC,PO

## 2020-05-21 PROCEDURE — 77067 MAMMO DIGITAL SCREENING BILAT WITH TOMOSYNTHESIS_CAD: ICD-10-PCS | Mod: 26,,, | Performed by: RADIOLOGY

## 2020-05-21 PROCEDURE — 77063 MAMMO DIGITAL SCREENING BILAT WITH TOMOSYNTHESIS_CAD: ICD-10-PCS | Mod: 26,,, | Performed by: RADIOLOGY

## 2020-05-21 PROCEDURE — 77067 SCR MAMMO BI INCL CAD: CPT | Mod: 26,,, | Performed by: RADIOLOGY

## 2020-05-22 ENCOUNTER — TELEPHONE (OUTPATIENT)
Dept: INTERNAL MEDICINE | Facility: CLINIC | Age: 55
End: 2020-05-22

## 2020-05-22 NOTE — TELEPHONE ENCOUNTER
----- Message from Emi Pereyra LPN sent at 5/22/2020  7:36 AM CDT -----      ----- Message -----  From: Jennie Enciso MD  Sent: 5/21/2020   4:43 PM CDT  To: [facility] Staff    Please note that your mammogram was read as follows  Impression:  There is no mammographic evidence of malignancy.  Jennie Santiago MD for Dr. Jackson

## 2020-05-26 ENCOUNTER — TELEPHONE (OUTPATIENT)
Dept: GASTROENTEROLOGY | Facility: CLINIC | Age: 55
End: 2020-05-26

## 2020-06-01 ENCOUNTER — PATIENT OUTREACH (OUTPATIENT)
Dept: ADMINISTRATIVE | Facility: OTHER | Age: 55
End: 2020-06-01

## 2020-06-01 NOTE — PROGRESS NOTES
Care Everywhere: updated  Immunization: updated  Health Maintenance: updated  Media Review: reviewed for possible outside colon cancer report  Legacy Review: n/a  Order placed: n/a  Upcoming appts:n/a  Colonoscopy case request 5.5.2020

## 2020-06-02 ENCOUNTER — OFFICE VISIT (OUTPATIENT)
Dept: OPTOMETRY | Facility: CLINIC | Age: 55
End: 2020-06-02
Payer: COMMERCIAL

## 2020-06-02 DIAGNOSIS — H52.4 MYOPIA WITH ASTIGMATISM AND PRESBYOPIA, BILATERAL: ICD-10-CM

## 2020-06-02 DIAGNOSIS — H52.13 MYOPIA WITH ASTIGMATISM AND PRESBYOPIA, BILATERAL: ICD-10-CM

## 2020-06-02 DIAGNOSIS — H04.123 DRY EYE SYNDROME OF BOTH EYES: Primary | ICD-10-CM

## 2020-06-02 DIAGNOSIS — H25.13 NUCLEAR SCLEROSIS OF BOTH EYES: ICD-10-CM

## 2020-06-02 DIAGNOSIS — H52.203 MYOPIA WITH ASTIGMATISM AND PRESBYOPIA, BILATERAL: ICD-10-CM

## 2020-06-02 DIAGNOSIS — Z00.00 ANNUAL PHYSICAL EXAM: ICD-10-CM

## 2020-06-02 PROCEDURE — 92015 DETERMINE REFRACTIVE STATE: CPT | Mod: S$GLB,,, | Performed by: OPTOMETRIST

## 2020-06-02 PROCEDURE — 92015 PR REFRACTION: ICD-10-PCS | Mod: S$GLB,,, | Performed by: OPTOMETRIST

## 2020-06-02 PROCEDURE — 92004 PR EYE EXAM, NEW PATIENT,COMPREHESV: ICD-10-PCS | Mod: S$GLB,,, | Performed by: OPTOMETRIST

## 2020-06-02 PROCEDURE — 99999 PR PBB SHADOW E&M-EST. PATIENT-LVL III: CPT | Mod: PBBFAC,,, | Performed by: OPTOMETRIST

## 2020-06-02 PROCEDURE — 99999 PR PBB SHADOW E&M-EST. PATIENT-LVL III: ICD-10-PCS | Mod: PBBFAC,,, | Performed by: OPTOMETRIST

## 2020-06-02 PROCEDURE — 92004 COMPRE OPH EXAM NEW PT 1/>: CPT | Mod: S$GLB,,, | Performed by: OPTOMETRIST

## 2020-06-02 NOTE — PROGRESS NOTES
HPI     ELVA:?  Glasses? Yes OTC rders   Contacts? no  H/o eye surgery, injections or laser: no  H/o eye injury: no  Known eye conditions? no  Family h/o eye conditions? no  Eye gtts?Visine due to dry eye     (-) Flashes (-) Floaters (-) Mucous   (-) Tearing (-) Itching (-) Burning   (-) Headaches (-) Eye Pain/discomfort (-) Irritation   (-) Redness (-) Double vision (-) Blurry vision    Diabetic? (-)  A1c?  (Hemoglobin A1C       Date                     Value               Ref Range             Status                05/05/2020               5.7 (H)             4.0 - 5.6 %           Final              Comment:    ADA Screening Guidelines:  5.7-6.4%  Consistent with   prediabetes  >or=6.5%  Consistent with diabetes  High levels of fetal   hemoglobin interfere with the HbA1C  assay. Heterozygous hemoglobin   variants (HbS, HgC, etc)do  not significantly interfere with this assay.     However, presence of multiple variants may affect accuracy.    ----------)        Last edited by Isabella Hayes on 6/2/2020  2:54 PM. (History)            Assessment /Plan     For exam results, see Encounter Report.    Dry eye syndrome of both eyes    Annual physical exam  -     Ambulatory referral/consult to Optometry    Myopia with astigmatism and presbyopia, bilateral    Nuclear sclerosis of both eyes      1. Recommend Systane Ultra or Refresh Optive BID-TID OU to aid with symptoms of dry eyes.  3. SRx released to patient. Patient educated on lens options. Normal ocular health. RTC 1 year for routine exam.   4. Nuclear sclerotic cataract - not visually significant. Observe.

## 2020-06-02 NOTE — LETTER
June 2, 2020      Tomy Jackson,   [address] [facility]  [place] [address]           [place] [facility]ometry  [address] [facility]  [place] [address]  Phone: [phone]  Fax: [phone]          Patient: Nina Cuba   MR Number: [id number]   YOB: 1965   Date of Visit: 6/2/2020       Dear Dr. Tomy Jackson:    Thank you for referring Nina Cuba to me for evaluation. Attached you will find relevant portions of my assessment and plan of care.    If you have questions, please do not hesitate to call me. I look forward to following Nina Cuba along with you.    Sincerely,    Shannan Nguyễn, OD    Enclosure  CC:  No Recipients    If you would like to receive this communication electronically, please contact [email or web address] or [phone] to request more information on [employer] Link access.    For providers and/or their staff who would like to refer a patient to [facility], please contact us through our one-stop-shop provider referral line, [facility], at [phone].    If you feel you have received this communication in error or would no longer like to receive these types of communications, please e-mail [email or web address]

## 2020-06-10 NOTE — TELEPHONE ENCOUNTER
Pharmacy is requesting a 90 day supply of patient medication.     Called patient. No answer. Mailbox is full.     Pharmacy needs to be verified.

## 2020-08-11 ENCOUNTER — TELEPHONE (OUTPATIENT)
Dept: INTERNAL MEDICINE | Facility: CLINIC | Age: 55
End: 2020-08-11

## 2020-08-11 RX ORDER — LORAZEPAM 0.5 MG/1
0.5 TABLET ORAL EVERY 12 HOURS PRN
Qty: 10 TABLET | Refills: 0 | Status: SHIPPED | OUTPATIENT
Start: 2020-08-11 | End: 2020-09-05

## 2020-08-11 NOTE — TELEPHONE ENCOUNTER
----- Message from Fatmata Francisco sent at 2020  7:55 AM CDT -----  Contact: Nina Cuba MRN [id number]@ [phone]  Patient mother passed and she's having a hard time with it and the stress of the , family and covid. She was hoping that she could get an Rx called in to help her. She is aware that dr hines is out of the office.        [facility] [phone]

## 2020-09-04 ENCOUNTER — TELEPHONE (OUTPATIENT)
Dept: INTERNAL MEDICINE | Facility: CLINIC | Age: 55
End: 2020-09-04

## 2020-09-04 NOTE — TELEPHONE ENCOUNTER
----- Message from Chuy De Leon sent at 9/4/2020  2:05 PM CDT -----  Regarding: [phone]  Type:  RX Refill Request    Who Called: Nina    RX Name and Strength: LORazepam (ATIVAN) 0.5 MG tablet    How is the patient currently taking it? (ex. 1XDay): Sig - Route: Take 1 tablet (0.5 mg total) by mouth every 12 (twelve) hours as needed for Anxiety. - Oral    Is this a 30 day or 90 day RX: 10 tablet    Preferred Pharmacy with phone number: [facility]pharm[facility] #[id number] - [place] - [address] [phone] (Phone)  [phone] (Fax)      Would the patient rather a call back or a response via [facility]? Callback    Best Call Back Number: [phone]    Additional Information: Nina is requesting a callback.

## 2020-09-05 RX ORDER — LORAZEPAM 0.5 MG/1
0.5 TABLET ORAL EVERY 12 HOURS PRN
Qty: 10 TABLET | Refills: 1 | Status: SHIPPED | OUTPATIENT
Start: 2020-09-05 | End: 2021-01-07 | Stop reason: SDUPTHER

## 2020-10-27 ENCOUNTER — TELEPHONE (OUTPATIENT)
Dept: OBSTETRICS AND GYNECOLOGY | Facility: CLINIC | Age: 55
End: 2020-10-27

## 2020-10-27 DIAGNOSIS — N95.0 POSTMENOPAUSAL BLEEDING: Primary | ICD-10-CM

## 2020-10-27 NOTE — TELEPHONE ENCOUNTER
----- Message from Mary Robertson sent at 10/27/2020 11:35 AM CDT -----  Name of Who is Calling: DAGMAR RENTERIA [[id number]]      What is the request in detail: Pt is calling to speak to staff in regards to side effects from a script pt states she been having migraines and nausea .... Please call to further assist .       Can the clinic reply by [facility]: N      What Number to Call Back if not in [facility]: [phone]

## 2020-10-27 NOTE — TELEPHONE ENCOUNTER
IF she is having bleeding on the estrace we can get an US and follow up in the office.  If the headaches are problematic I would recommend for the patient to stop the hormones.    AParise

## 2020-10-27 NOTE — TELEPHONE ENCOUNTER
Please Advise:  Patient stated that she has had spotting/bleeding and migraines after taking the estrace. Would like to know if she should discontinue the medication, but does not want to interrupt the effectiveness of the medication.

## 2020-11-04 ENCOUNTER — TELEPHONE (OUTPATIENT)
Dept: OBSTETRICS AND GYNECOLOGY | Facility: CLINIC | Age: 55
End: 2020-11-04

## 2020-11-04 NOTE — TELEPHONE ENCOUNTER
Patient stated that she has to pay close to $1,100 to have her ultrasound. Patient stated that she stopped bleeding and cramping about 3 days ago and would like to wait until January to have the ultrasound done.

## 2021-01-07 RX ORDER — LORAZEPAM 0.5 MG/1
0.5 TABLET ORAL EVERY 12 HOURS PRN
Qty: 10 TABLET | Refills: 1 | Status: SHIPPED | OUTPATIENT
Start: 2021-01-07 | End: 2021-06-08

## 2021-03-23 RX ORDER — PANTOPRAZOLE SODIUM 40 MG/1
40 TABLET, DELAYED RELEASE ORAL DAILY
Qty: 90 TABLET | Refills: 1 | Status: SHIPPED | OUTPATIENT
Start: 2021-03-23 | End: 2021-09-07 | Stop reason: SDUPTHER

## 2021-04-12 ENCOUNTER — PATIENT MESSAGE (OUTPATIENT)
Dept: RESEARCH | Facility: HOSPITAL | Age: 56
End: 2021-04-12

## 2021-04-23 ENCOUNTER — TELEPHONE (OUTPATIENT)
Dept: INTERNAL MEDICINE | Facility: CLINIC | Age: 56
End: 2021-04-23

## 2021-04-23 DIAGNOSIS — Z00.00 ANNUAL PHYSICAL EXAM: Primary | ICD-10-CM

## 2021-05-07 ENCOUNTER — PATIENT OUTREACH (OUTPATIENT)
Dept: ADMINISTRATIVE | Facility: HOSPITAL | Age: 56
End: 2021-05-07

## 2021-06-08 RX ORDER — LORAZEPAM 0.5 MG/1
0.5 TABLET ORAL EVERY 12 HOURS PRN
Qty: 10 TABLET | Refills: 1 | Status: SHIPPED | OUTPATIENT
Start: 2021-06-08 | End: 2021-09-22 | Stop reason: SDUPTHER

## 2021-06-09 DIAGNOSIS — Z12.31 OTHER SCREENING MAMMOGRAM: ICD-10-CM

## 2021-06-15 ENCOUNTER — LAB VISIT (OUTPATIENT)
Dept: LAB | Facility: HOSPITAL | Age: 56
End: 2021-06-15
Attending: INTERNAL MEDICINE
Payer: COMMERCIAL

## 2021-06-15 DIAGNOSIS — Z00.00 ANNUAL PHYSICAL EXAM: ICD-10-CM

## 2021-06-15 LAB
ALBUMIN SERPL BCP-MCNC: 3.9 G/DL (ref 3.5–5.2)
ALP SERPL-CCNC: 70 U/L (ref 55–135)
ALT SERPL W/O P-5'-P-CCNC: 20 U/L (ref 10–44)
ANION GAP SERPL CALC-SCNC: 4 MMOL/L (ref 8–16)
AST SERPL-CCNC: 22 U/L (ref 10–40)
BASOPHILS # BLD AUTO: 0.05 K/UL (ref 0–0.2)
BASOPHILS NFR BLD: 0.9 % (ref 0–1.9)
BILIRUB SERPL-MCNC: 0.3 MG/DL (ref 0.1–1)
BUN SERPL-MCNC: 18 MG/DL (ref 6–20)
CALCIUM SERPL-MCNC: 8.7 MG/DL (ref 8.7–10.5)
CHLORIDE SERPL-SCNC: 110 MMOL/L (ref 95–110)
CHOLEST SERPL-MCNC: 188 MG/DL (ref 120–199)
CHOLEST/HDLC SERPL: 2.8 {RATIO} (ref 2–5)
CO2 SERPL-SCNC: 26 MMOL/L (ref 23–29)
CREAT SERPL-MCNC: 0.8 MG/DL (ref 0.5–1.4)
DIFFERENTIAL METHOD: ABNORMAL
EOSINOPHIL # BLD AUTO: 0.2 K/UL (ref 0–0.5)
EOSINOPHIL NFR BLD: 2.7 % (ref 0–8)
ERYTHROCYTE [DISTWIDTH] IN BLOOD BY AUTOMATED COUNT: 14.6 % (ref 11.5–14.5)
EST. GFR  (AFRICAN AMERICAN): >60 ML/MIN/1.73 M^2
EST. GFR  (NON AFRICAN AMERICAN): >60 ML/MIN/1.73 M^2
ESTIMATED AVG GLUCOSE: 108 MG/DL (ref 68–131)
GLUCOSE SERPL-MCNC: 101 MG/DL (ref 70–110)
HBA1C MFR BLD: 5.4 % (ref 4–5.6)
HCT VFR BLD AUTO: 37.8 % (ref 37–48.5)
HDLC SERPL-MCNC: 67 MG/DL (ref 40–75)
HDLC SERPL: 35.6 % (ref 20–50)
HGB BLD-MCNC: 12.6 G/DL (ref 12–16)
IMM GRANULOCYTES # BLD AUTO: 0.01 K/UL (ref 0–0.04)
IMM GRANULOCYTES NFR BLD AUTO: 0.2 % (ref 0–0.5)
LDLC SERPL CALC-MCNC: 110.8 MG/DL (ref 63–159)
LYMPHOCYTES # BLD AUTO: 2 K/UL (ref 1–4.8)
LYMPHOCYTES NFR BLD: 36.4 % (ref 18–48)
MCH RBC QN AUTO: 29.4 PG (ref 27–31)
MCHC RBC AUTO-ENTMCNC: 33.3 G/DL (ref 32–36)
MCV RBC AUTO: 88 FL (ref 82–98)
MONOCYTES # BLD AUTO: 0.4 K/UL (ref 0.3–1)
MONOCYTES NFR BLD: 6.8 % (ref 4–15)
NEUTROPHILS # BLD AUTO: 3 K/UL (ref 1.8–7.7)
NEUTROPHILS NFR BLD: 53 % (ref 38–73)
NONHDLC SERPL-MCNC: 121 MG/DL
NRBC BLD-RTO: 0 /100 WBC
PLATELET # BLD AUTO: 312 K/UL (ref 150–450)
PMV BLD AUTO: 10.7 FL (ref 9.2–12.9)
POTASSIUM SERPL-SCNC: 4.4 MMOL/L (ref 3.5–5.1)
PROT SERPL-MCNC: 7.2 G/DL (ref 6–8.4)
RBC # BLD AUTO: 4.28 M/UL (ref 4–5.4)
SODIUM SERPL-SCNC: 140 MMOL/L (ref 136–145)
TRIGL SERPL-MCNC: 51 MG/DL (ref 30–150)
TSH SERPL DL<=0.005 MIU/L-ACNC: 1.17 UIU/ML (ref 0.4–4)
WBC # BLD AUTO: 5.57 K/UL (ref 3.9–12.7)

## 2021-06-15 PROCEDURE — 84443 ASSAY THYROID STIM HORMONE: CPT | Performed by: INTERNAL MEDICINE

## 2021-06-15 PROCEDURE — 80061 LIPID PANEL: CPT | Performed by: INTERNAL MEDICINE

## 2021-06-15 PROCEDURE — 80053 COMPREHEN METABOLIC PANEL: CPT | Performed by: INTERNAL MEDICINE

## 2021-06-15 PROCEDURE — 83036 HEMOGLOBIN GLYCOSYLATED A1C: CPT | Performed by: INTERNAL MEDICINE

## 2021-06-15 PROCEDURE — 36415 COLL VENOUS BLD VENIPUNCTURE: CPT | Performed by: INTERNAL MEDICINE

## 2021-06-15 PROCEDURE — 85025 COMPLETE CBC W/AUTO DIFF WBC: CPT | Performed by: INTERNAL MEDICINE

## 2021-06-22 ENCOUNTER — OFFICE VISIT (OUTPATIENT)
Dept: INTERNAL MEDICINE | Facility: CLINIC | Age: 56
End: 2021-06-22
Payer: COMMERCIAL

## 2021-06-22 VITALS
TEMPERATURE: 98 F | HEIGHT: 67 IN | HEART RATE: 72 BPM | SYSTOLIC BLOOD PRESSURE: 92 MMHG | DIASTOLIC BLOOD PRESSURE: 64 MMHG | BODY MASS INDEX: 31.04 KG/M2 | WEIGHT: 197.75 LBS | OXYGEN SATURATION: 97 %

## 2021-06-22 DIAGNOSIS — R10.13 EPIGASTRIC PAIN: ICD-10-CM

## 2021-06-22 DIAGNOSIS — Z00.00 ANNUAL PHYSICAL EXAM: Primary | ICD-10-CM

## 2021-06-22 DIAGNOSIS — K21.9 GASTROESOPHAGEAL REFLUX DISEASE, UNSPECIFIED WHETHER ESOPHAGITIS PRESENT: ICD-10-CM

## 2021-06-22 DIAGNOSIS — Z12.31 ENCOUNTER FOR SCREENING MAMMOGRAM FOR BREAST CANCER: ICD-10-CM

## 2021-06-22 DIAGNOSIS — E66.9 OBESITY (BMI 30-39.9): ICD-10-CM

## 2021-06-22 PROCEDURE — 99396 PR PREVENTIVE VISIT,EST,40-64: ICD-10-PCS | Mod: S$GLB,,, | Performed by: INTERNAL MEDICINE

## 2021-06-22 PROCEDURE — 99999 PR PBB SHADOW E&M-EST. PATIENT-LVL III: CPT | Mod: PBBFAC,,, | Performed by: INTERNAL MEDICINE

## 2021-06-22 PROCEDURE — 99396 PREV VISIT EST AGE 40-64: CPT | Mod: S$GLB,,, | Performed by: INTERNAL MEDICINE

## 2021-06-22 PROCEDURE — 99999 PR PBB SHADOW E&M-EST. PATIENT-LVL III: ICD-10-PCS | Mod: PBBFAC,,, | Performed by: INTERNAL MEDICINE

## 2021-09-03 ENCOUNTER — PATIENT MESSAGE (OUTPATIENT)
Dept: GASTROENTEROLOGY | Facility: CLINIC | Age: 56
End: 2021-09-03

## 2021-09-07 ENCOUNTER — OFFICE VISIT (OUTPATIENT)
Dept: GASTROENTEROLOGY | Facility: CLINIC | Age: 56
End: 2021-09-07
Payer: COMMERCIAL

## 2021-09-07 ENCOUNTER — TELEPHONE (OUTPATIENT)
Dept: GASTROENTEROLOGY | Facility: CLINIC | Age: 56
End: 2021-09-07

## 2021-09-07 ENCOUNTER — PATIENT OUTREACH (OUTPATIENT)
Dept: ADMINISTRATIVE | Facility: OTHER | Age: 56
End: 2021-09-07

## 2021-09-07 DIAGNOSIS — R10.13 EPIGASTRIC PAIN: ICD-10-CM

## 2021-09-07 DIAGNOSIS — Z12.11 COLON CANCER SCREENING: Primary | ICD-10-CM

## 2021-09-07 PROCEDURE — 99204 OFFICE O/P NEW MOD 45 MIN: CPT | Mod: 95,,, | Performed by: INTERNAL MEDICINE

## 2021-09-07 PROCEDURE — 99204 PR OFFICE/OUTPT VISIT, NEW, LEVL IV, 45-59 MIN: ICD-10-PCS | Mod: 95,,, | Performed by: INTERNAL MEDICINE

## 2021-09-07 RX ORDER — DICYCLOMINE HYDROCHLORIDE 20 MG/1
20 TABLET ORAL EVERY 6 HOURS PRN
Qty: 90 TABLET | Refills: 3 | Status: SHIPPED | OUTPATIENT
Start: 2021-09-07 | End: 2021-11-11

## 2021-09-07 RX ORDER — PANTOPRAZOLE SODIUM 40 MG/1
40 TABLET, DELAYED RELEASE ORAL DAILY
Qty: 90 TABLET | Refills: 3 | Status: SHIPPED | OUTPATIENT
Start: 2021-09-07 | End: 2022-09-19 | Stop reason: SDUPTHER

## 2021-09-22 RX ORDER — LORAZEPAM 0.5 MG/1
0.5 TABLET ORAL EVERY 12 HOURS PRN
Qty: 30 TABLET | Refills: 1 | Status: SHIPPED | OUTPATIENT
Start: 2021-09-22 | End: 2022-03-11

## 2021-10-04 ENCOUNTER — PATIENT MESSAGE (OUTPATIENT)
Dept: ADMINISTRATIVE | Facility: HOSPITAL | Age: 56
End: 2021-10-04

## 2021-10-12 DIAGNOSIS — N95.1 MENOPAUSAL SYMPTOMS: ICD-10-CM

## 2021-10-12 RX ORDER — ESTRADIOL 1 MG/1
TABLET ORAL
Qty: 90 TABLET | Refills: 4 | OUTPATIENT
Start: 2021-10-12

## 2021-10-13 ENCOUNTER — NURSE TRIAGE (OUTPATIENT)
Dept: ADMINISTRATIVE | Facility: CLINIC | Age: 56
End: 2021-10-13

## 2021-10-14 ENCOUNTER — TELEPHONE (OUTPATIENT)
Dept: OBSTETRICS AND GYNECOLOGY | Facility: CLINIC | Age: 56
End: 2021-10-14

## 2021-10-14 DIAGNOSIS — N95.1 MENOPAUSAL SYMPTOMS: ICD-10-CM

## 2021-10-14 RX ORDER — ESTRADIOL 1 MG/1
1 TABLET ORAL DAILY
Qty: 90 TABLET | Refills: 0 | Status: SHIPPED | OUTPATIENT
Start: 2021-10-14 | End: 2021-11-11 | Stop reason: SDUPTHER

## 2021-10-14 RX ORDER — PROGESTERONE 100 MG/1
CAPSULE ORAL
Qty: 90 CAPSULE | Refills: 0 | Status: SHIPPED | OUTPATIENT
Start: 2021-10-14 | End: 2021-11-11 | Stop reason: SDUPTHER

## 2021-10-26 ENCOUNTER — TELEPHONE (OUTPATIENT)
Dept: ENDOSCOPY | Facility: HOSPITAL | Age: 56
End: 2021-10-26
Payer: COMMERCIAL

## 2021-11-10 ENCOUNTER — PATIENT OUTREACH (OUTPATIENT)
Dept: ADMINISTRATIVE | Facility: OTHER | Age: 56
End: 2021-11-10
Payer: COMMERCIAL

## 2021-11-11 ENCOUNTER — OFFICE VISIT (OUTPATIENT)
Dept: OBSTETRICS AND GYNECOLOGY | Facility: CLINIC | Age: 56
End: 2021-11-11
Payer: COMMERCIAL

## 2021-11-11 VITALS
DIASTOLIC BLOOD PRESSURE: 80 MMHG | WEIGHT: 200.75 LBS | BODY MASS INDEX: 30.43 KG/M2 | SYSTOLIC BLOOD PRESSURE: 122 MMHG | HEIGHT: 68 IN

## 2021-11-11 DIAGNOSIS — Z12.31 BREAST CANCER SCREENING BY MAMMOGRAM: ICD-10-CM

## 2021-11-11 DIAGNOSIS — B37.9 CANDIDA INFECTION: ICD-10-CM

## 2021-11-11 DIAGNOSIS — N95.1 MENOPAUSAL SYMPTOMS: ICD-10-CM

## 2021-11-11 DIAGNOSIS — Z79.890 HORMONE REPLACEMENT THERAPY (HRT): ICD-10-CM

## 2021-11-11 DIAGNOSIS — Z01.419 ENCOUNTER FOR GYNECOLOGICAL EXAMINATION WITHOUT ABNORMAL FINDING: Primary | ICD-10-CM

## 2021-11-11 PROCEDURE — 99396 PR PREVENTIVE VISIT,EST,40-64: ICD-10-PCS | Mod: S$GLB,,, | Performed by: OBSTETRICS & GYNECOLOGY

## 2021-11-11 PROCEDURE — 99999 PR PBB SHADOW E&M-EST. PATIENT-LVL III: ICD-10-PCS | Mod: PBBFAC,,, | Performed by: OBSTETRICS & GYNECOLOGY

## 2021-11-11 PROCEDURE — 99999 PR PBB SHADOW E&M-EST. PATIENT-LVL III: CPT | Mod: PBBFAC,,, | Performed by: OBSTETRICS & GYNECOLOGY

## 2021-11-11 PROCEDURE — 99396 PREV VISIT EST AGE 40-64: CPT | Mod: S$GLB,,, | Performed by: OBSTETRICS & GYNECOLOGY

## 2021-11-11 RX ORDER — MELOXICAM 15 MG/1
15 TABLET ORAL DAILY
COMMUNITY
Start: 2021-09-15 | End: 2023-09-18

## 2021-11-11 RX ORDER — GABAPENTIN 300 MG/1
CAPSULE ORAL
COMMUNITY
Start: 2021-10-08 | End: 2023-09-18

## 2021-11-11 RX ORDER — ESTRADIOL 1 MG/1
1 TABLET ORAL DAILY
Qty: 90 TABLET | Refills: 0 | Status: SHIPPED | OUTPATIENT
Start: 2021-11-11 | End: 2021-11-11 | Stop reason: SDUPTHER

## 2021-11-11 RX ORDER — FLUCONAZOLE 150 MG/1
150 TABLET ORAL ONCE AS NEEDED
Qty: 2 TABLET | Refills: 2 | Status: SHIPPED | OUTPATIENT
Start: 2021-11-11 | End: 2022-06-08

## 2021-11-11 RX ORDER — ESTRADIOL 1 MG/1
1 TABLET ORAL DAILY
Qty: 90 TABLET | Refills: 4 | Status: SHIPPED | OUTPATIENT
Start: 2021-11-11 | End: 2023-01-01

## 2021-11-11 RX ORDER — PROGESTERONE 100 MG/1
CAPSULE ORAL
Qty: 90 CAPSULE | Refills: 4 | Status: SHIPPED | OUTPATIENT
Start: 2021-11-11 | End: 2023-02-03

## 2021-11-11 NOTE — PROGRESS NOTES
"CC: Well woman exam    Nina Cuba is a 56 y.o. female  presents for a well woman exam.    She is having spotting on HRT  She was stopped having regular cycles at age 54 yrs old  She has some bleeding off and on      Past Medical History:   Diagnosis Date    Back problem     GERD (gastroesophageal reflux disease)     Obesity (BMI 30-39.9)        Past Surgical History:   Procedure Laterality Date    TONSILLECTOMY         OB History    Para Term  AB Living   0 0 0 0 0 0   SAB IAB Ectopic Multiple Live Births   0 0 0 0 0       Family History   Problem Relation Age of Onset    Diabetes Mother     Hypertension Mother     Lymphoma Father     Kidney cancer Brother     Heart disease Maternal Grandfather     Stroke Neg Hx        Social History     Tobacco Use    Smoking status: Former Smoker    Smokeless tobacco: Never Used   Substance Use Topics    Alcohol use: Yes     Comment: rare    Drug use: Never       /80   Ht 5' 7.5" (1.715 m)   Wt 91 kg (200 lb 11.7 oz)   LMP 2018   BMI 30.97 kg/m²     ROS:  GENERAL: Denies weight gain or weight loss. Feeling well overall.   SKIN: Denies rash or lesions.   HEAD: Denies head injury or headache.   NODES: Denies enlarged lymph nodes.   CHEST: Denies chest pain or shortness of breath.   CARDIOVASCULAR: Denies palpitations or left sided chest pain.   ABDOMEN: No abdominal pain, constipation, diarrhea, nausea, vomiting or rectal bleeding.   URINARY: No frequency, dysuria, hematuria, or burning on urination.  REPRODUCTIVE: See HPI.   BREASTS: The patient performs breast self-examination and denies pain, lumps, or nipple discharge.   HEMATOLOGIC: No easy bruisability or excessive bleeding.  MUSCULOSKELETAL: Denies joint pain or swelling.   NEUROLOGIC: Denies syncope or weakness.   PSYCHIATRIC: Denies depression, anxiety or mood swings.    Physical Exam:    APPEARANCE: Well nourished, well developed, in no acute distress.  AFFECT: WNL, " alert and oriented x 3  SKIN: No acne or hirsutism  NECK: Neck symmetric without masses or thyromegaly  NODES: No inguinal, cervical, axillary, or femoral lymph node enlargement  CHEST: Good respiratory effect  ABDOMEN: Soft.  No tenderness or masses.  No hepatosplenomegaly.  No hernias.  BREASTS: Symmetrical, no skin changes or visible lesions.  No palpable masses, nipple discharge bilaterally.  PELVIC: Normal external genitalia without lesions.  Normal hair distribution.  Adequate perineal body, normal urethral meatus.  Vagina moist and well rugated without lesions or discharge.  Cervix pink, without lesions, discharge or tenderness.  No significant cystocele or rectocele.  Bimanual exam shows uterus to be normal size, regular, mobile and nontender.  Adnexa without masses or tenderness.    EXTREMITIES: No edema.    ASSESSMENT AND PLAN  1. Encounter for gynecological examination without abnormal finding     2. Menopausal symptoms  estradioL (ESTRACE) 1 MG tablet    progesterone (PROMETRIUM) 100 MG capsule    US Pelvis Comp with Transvag NON-OB (xpd    DISCONTINUED: estradioL (ESTRACE) 1 MG tablet   3. Breast cancer screening by mammogram  Mammo Digital Screening Bilat w/ Eduin    CANCELED: Mammo Digital Screening Bilat   4. Candida infection  fluconazole (DIFLUCAN) 150 MG Tab   5. Hormone replacement therapy (HRT)  US Pelvis Comp with Transvag NON-OB (xpd     IF EMS is greater than 4 mm will consider EMBX    Patient was counseled today on A.C.S. Pap guidelines and recommendations for yearly pelvic exams, mammograms and monthly self breast exams; to see her PCP for other health maintenance.     Follow up in about 1 year (around 11/11/2022), or if symptoms worsen or fail to improve.

## 2021-11-22 ENCOUNTER — TELEPHONE (OUTPATIENT)
Dept: OBSTETRICS AND GYNECOLOGY | Facility: CLINIC | Age: 56
End: 2021-11-22
Payer: COMMERCIAL

## 2022-01-18 ENCOUNTER — PATIENT MESSAGE (OUTPATIENT)
Dept: ADMINISTRATIVE | Facility: HOSPITAL | Age: 57
End: 2022-01-18
Payer: COMMERCIAL

## 2022-02-23 ENCOUNTER — HOSPITAL ENCOUNTER (OUTPATIENT)
Dept: RADIOLOGY | Facility: HOSPITAL | Age: 57
Discharge: HOME | End: 2022-02-23
Attending: OBSTETRICS & GYNECOLOGY
Payer: COMMERCIAL

## 2022-02-23 VITALS — HEIGHT: 67 IN | WEIGHT: 200 LBS | BODY MASS INDEX: 31.39 KG/M2

## 2022-02-23 DIAGNOSIS — Z12.31 BREAST CANCER SCREENING BY MAMMOGRAM: ICD-10-CM

## 2022-02-23 DIAGNOSIS — N95.1 MENOPAUSAL SYMPTOMS: ICD-10-CM

## 2022-02-23 DIAGNOSIS — Z79.890 HORMONE REPLACEMENT THERAPY (HRT): ICD-10-CM

## 2022-02-23 PROCEDURE — 77067 SCR MAMMO BI INCL CAD: CPT | Mod: TC

## 2022-02-23 PROCEDURE — 76830 US PELVIS COMP WITH TRANSVAG NON-OB (XPD): ICD-10-PCS | Mod: 26,59,, | Performed by: RADIOLOGY

## 2022-02-23 PROCEDURE — 76830 TRANSVAGINAL US NON-OB: CPT | Mod: TC

## 2022-02-23 PROCEDURE — 77063 MAMMO DIGITAL SCREENING BILAT WITH TOMO: ICD-10-PCS | Mod: 26,,, | Performed by: RADIOLOGY

## 2022-02-23 PROCEDURE — 76856 US PELVIS COMP WITH TRANSVAG NON-OB (XPD): ICD-10-PCS | Mod: 26,,, | Performed by: RADIOLOGY

## 2022-02-23 PROCEDURE — 76830 TRANSVAGINAL US NON-OB: CPT | Mod: 26,59,, | Performed by: RADIOLOGY

## 2022-02-23 PROCEDURE — 77067 SCR MAMMO BI INCL CAD: CPT | Mod: 26,,, | Performed by: RADIOLOGY

## 2022-02-23 PROCEDURE — 77067 MAMMO DIGITAL SCREENING BILAT WITH TOMO: ICD-10-PCS | Mod: 26,,, | Performed by: RADIOLOGY

## 2022-02-23 PROCEDURE — 77063 BREAST TOMOSYNTHESIS BI: CPT | Mod: 26,,, | Performed by: RADIOLOGY

## 2022-02-23 PROCEDURE — 76856 US EXAM PELVIC COMPLETE: CPT | Mod: 26,,, | Performed by: RADIOLOGY

## 2022-03-11 RX ORDER — LORAZEPAM 0.5 MG/1
TABLET ORAL
Qty: 30 TABLET | Refills: 1 | Status: SHIPPED | OUTPATIENT
Start: 2022-03-11 | End: 2022-08-18

## 2022-03-29 ENCOUNTER — TELEPHONE (OUTPATIENT)
Dept: OBSTETRICS AND GYNECOLOGY | Facility: CLINIC | Age: 57
End: 2022-03-29
Payer: COMMERCIAL

## 2022-03-29 NOTE — TELEPHONE ENCOUNTER
----- Message from [facility] sent at 3/29/2022  1:05 PM CDT -----  Who Called: DAGMAR RENTERIA    Name of Test (Lab/Mammo/Etc): US Pelvis    Date of Test: 02/23/2022    Ordering Provider: Carolina Jeong MD    Where the test was performed: [facility]    Best Call Back Number: [phone]

## 2022-03-30 NOTE — TELEPHONE ENCOUNTER
THe lining is thick on the endometrial biopsy   Please schedule appt with an endometrial biopsy so we can rule out any pathology inside of the uterus AP

## 2022-04-01 ENCOUNTER — TELEPHONE (OUTPATIENT)
Dept: OBSTETRICS AND GYNECOLOGY | Facility: CLINIC | Age: 57
End: 2022-04-01
Payer: COMMERCIAL

## 2022-04-01 NOTE — TELEPHONE ENCOUNTER
----- Message from Trista Mckinley sent at 4/1/2022  4:02 PM CDT -----  Pt is returning call  Pt can be contacted at [phone]

## 2022-04-05 ENCOUNTER — PROCEDURE VISIT (OUTPATIENT)
Dept: OBSTETRICS AND GYNECOLOGY | Facility: CLINIC | Age: 57
End: 2022-04-05
Payer: COMMERCIAL

## 2022-04-05 VITALS
SYSTOLIC BLOOD PRESSURE: 110 MMHG | HEIGHT: 67 IN | WEIGHT: 199.5 LBS | BODY MASS INDEX: 31.31 KG/M2 | DIASTOLIC BLOOD PRESSURE: 78 MMHG

## 2022-04-05 DIAGNOSIS — N95.1 PERIMENOPAUSAL: ICD-10-CM

## 2022-04-05 DIAGNOSIS — R93.89 THICKENED ENDOMETRIUM: Primary | ICD-10-CM

## 2022-04-05 PROCEDURE — 99499 NO LOS: ICD-10-PCS | Mod: S$GLB,,, | Performed by: OBSTETRICS & GYNECOLOGY

## 2022-04-05 PROCEDURE — 88305 TISSUE EXAM BY PATHOLOGIST: CPT | Mod: 26,,, | Performed by: PATHOLOGY

## 2022-04-05 PROCEDURE — 88305 TISSUE EXAM BY PATHOLOGIST: CPT | Performed by: PATHOLOGY

## 2022-04-05 PROCEDURE — 58100 BIOPSY OF UTERUS LINING: CPT | Mod: S$GLB,,, | Performed by: OBSTETRICS & GYNECOLOGY

## 2022-04-05 PROCEDURE — 99499 UNLISTED E&M SERVICE: CPT | Mod: S$GLB,,, | Performed by: OBSTETRICS & GYNECOLOGY

## 2022-04-05 PROCEDURE — 88305 TISSUE EXAM BY PATHOLOGIST: ICD-10-PCS | Mod: 26,,, | Performed by: PATHOLOGY

## 2022-04-05 PROCEDURE — 58100 ENDOMETRIAL BIOPSY: ICD-10-PCS | Mod: S$GLB,,, | Performed by: OBSTETRICS & GYNECOLOGY

## 2022-04-05 NOTE — PROCEDURES
Endometrial Biopsy- Today    Date/Time: 4/5/2022 11:00 AM  Performed by: Carolina Jeong MD  Authorized by: Carolina Jeong MD

## 2022-04-05 NOTE — PROCEDURES
Endometrial biopsy    Date/Time: 4/5/2022 11:00 AM  Performed by: Carolina Jeong MD  Authorized by: Carolina Jeong MD     Consent:     Consent obtained:  Verbal    Patient questions answered: yes      Patient agrees, verbalizes understanding, and wants to proceed: yes      Educational handouts given: yes      Instructions and paperwork completed: yes    Indication:     Indications: Menorrhagia    Pre-procedure:     Pre-procedure timeout performed: yes    Procedure:     Procedure: endometrial biopsy with Pipelle      Cervix cleaned and prepped: yes      A paracervical block was performed: no      An intracervical block was performed: no      The cervix was dilated: yes      Uterus sounded: yes      Uterus sound depth (cm):  7    Specimen collected: specimen collected and sent to pathology         Discussed DUB treatment options- Possible  treatments, D & C , endometrial ablation vs HYST. Treat menorrhagia with OCPs, Lysteda or medical management.   Risks and benefits of surgical  procedures discussed.

## 2022-04-13 LAB
FINAL PATHOLOGIC DIAGNOSIS: NORMAL
GROSS: NORMAL
Lab: NORMAL

## 2022-04-22 ENCOUNTER — OFFICE VISIT (OUTPATIENT)
Dept: OBSTETRICS AND GYNECOLOGY | Facility: CLINIC | Age: 57
End: 2022-04-22
Payer: COMMERCIAL

## 2022-04-22 DIAGNOSIS — R93.89 THICKENED ENDOMETRIUM: Primary | ICD-10-CM

## 2022-04-22 DIAGNOSIS — N95.1 MENOPAUSAL SYMPTOMS: ICD-10-CM

## 2022-04-22 PROCEDURE — 99213 OFFICE O/P EST LOW 20 MIN: CPT | Mod: 95,,, | Performed by: OBSTETRICS & GYNECOLOGY

## 2022-04-22 PROCEDURE — 99213 PR OFFICE/OUTPT VISIT, EST, LEVL III, 20-29 MIN: ICD-10-PCS | Mod: 95,,, | Performed by: OBSTETRICS & GYNECOLOGY

## 2022-04-22 NOTE — PROGRESS NOTES
The patient location is:  HOME    The chief complaint leading to consultation is: PMB    Visit type: audiovisual    Face to Face time with patient:  15  minutes of total time spent on the encounter, which includes face to face time and non-face to face time preparing to see the patient (eg, review of tests), Obtaining and/or reviewing separately obtained history, Documenting clinical information in the electronic or other health record, Independently interpreting results (not separately reported) and communicating results to the patient/family/caregiver, or Care coordination (not separately reported).         Each patient to whom he or she provides medical services by telemedicine is:  (1) informed of the relationship between the physician and patient and the respective role of any other health care provider with respect to management of the patient; and (2) notified that he or she may decline to receive medical services by telemedicine and may withdraw from such care at any time.    Notes:     CC: thickened endometrium      Nina Cuba is a 57 y.o. female  presents for a discussion on her endometrial biopsy and PM spotting.      Endometrium    Endometrium (curettage):   - Inactive endometrium with glandular and stromal breakdown       Past Medical History:   Diagnosis Date    Back problem     GERD (gastroesophageal reflux disease)     Obesity (BMI 30-39.9)        Past Surgical History:   Procedure Laterality Date    TONSILLECTOMY         OB History    Para Term  AB Living   0 0 0 0 0 0   SAB IAB Ectopic Multiple Live Births   0 0 0 0 0       Family History   Problem Relation Age of Onset    Diabetes Mother     Hypertension Mother     Lymphoma Father     Kidney cancer Brother     Heart disease Maternal Grandfather     Stroke Neg Hx        Social History     Tobacco Use    Smoking status: Former Smoker    Smokeless tobacco: Never Used   Substance Use Topics    Alcohol use: Yes      Comment: rare    Drug use: Never       LMP 08/09/2018     ROS:  GENERAL: Denies weight gain or weight loss. Feeling well overall.   SKIN: Denies rash or lesions.   HEAD: Denies head injury or headache.   NODES: Denies enlarged lymph nodes.   CHEST: Denies chest pain or shortness of breath.   CARDIOVASCULAR: Denies palpitations or left sided chest pain.   ABDOMEN: No abdominal pain, constipation, diarrhea, nausea, vomiting or rectal bleeding.   URINARY: No frequency, dysuria, hematuria, or burning on urination.  REPRODUCTIVE: See HPI.   BREASTS: The patient performs breast self-examination and denies pain, lumps, or nipple discharge.   HEMATOLOGIC: No easy bruisability or excessive bleeding.  MUSCULOSKELETAL: Denies joint pain or swelling.   NEUROLOGIC: Denies syncope or weakness.   PSYCHIATRIC: Denies depression, anxiety or mood swings.    Physical Exam:    APPEARANCE: Well nourished, well developed, in no acute distress.  AFFECT: WNL, alert and oriented x 3  SKIN: No acne or hirsutism        ASSESSMENT AND PLAN  1. Thickened endometrium     2. Menopausal symptoms         She will monitor for now  She is doing well and does not want to procede with D & C at this time      Patient was counseled today on A.C.S. Pap guidelines and recommendations for yearly pelvic exams, mammograms and monthly self breast exams; to see her PCP for other health maintenance.     Will monitor her bleeding for now and notify the office if the sx worsen    Follow up in about 1 year (around 4/22/2023), or if symptoms worsen or fail to improve.

## 2022-08-18 RX ORDER — LORAZEPAM 0.5 MG/1
TABLET ORAL
Qty: 30 TABLET | Refills: 0 | Status: SHIPPED | OUTPATIENT
Start: 2022-08-18 | End: 2022-10-10

## 2022-08-23 ENCOUNTER — HOSPITAL ENCOUNTER (EMERGENCY)
Facility: HOSPITAL | Age: 57
Discharge: HOME | End: 2022-08-23
Attending: EMERGENCY MEDICINE
Payer: COMMERCIAL

## 2022-08-23 VITALS
RESPIRATION RATE: 18 BRPM | OXYGEN SATURATION: 98 % | WEIGHT: 200 LBS | SYSTOLIC BLOOD PRESSURE: 157 MMHG | DIASTOLIC BLOOD PRESSURE: 92 MMHG | BODY MASS INDEX: 31.39 KG/M2 | HEIGHT: 67 IN | HEART RATE: 74 BPM | TEMPERATURE: 98 F

## 2022-08-23 DIAGNOSIS — S39.012A STRAIN OF LUMBAR REGION, INITIAL ENCOUNTER: Primary | ICD-10-CM

## 2022-08-23 PROCEDURE — 99284 EMERGENCY DEPT VISIT MOD MDM: CPT

## 2022-08-23 RX ORDER — ORPHENADRINE CITRATE 100 MG/1
100 TABLET, EXTENDED RELEASE ORAL 2 TIMES DAILY
Qty: 20 TABLET | Refills: 0 | Status: SHIPPED | OUTPATIENT
Start: 2022-08-23 | End: 2022-09-02

## 2022-08-23 RX ORDER — IBUPROFEN 600 MG/1
600 TABLET ORAL EVERY 6 HOURS PRN
Qty: 20 TABLET | Refills: 0 | Status: SHIPPED | OUTPATIENT
Start: 2022-08-23 | End: 2022-10-12 | Stop reason: SDUPTHER

## 2022-08-23 RX ORDER — LIDOCAINE 50 MG/G
1 PATCH TOPICAL ONCE
Qty: 15 PATCH | Refills: 0 | Status: SHIPPED | OUTPATIENT
Start: 2022-08-23 | End: 2022-08-23

## 2022-08-23 NOTE — FIRST PROVIDER EVALUATION
"Medical screening exam completed.  I have conducted a focused provider triage encounter, findings are as follows:    Brief history of present illness:  56 yo F presents with lower back pain after moving a shelf by herself yesterday at work. She took Aleve at home today with no relief. She is able to ambulate without difficulty. Denies other associated symptoms.     Vitals:    08/23/22 1813   BP: (!) 157/92   BP Location: Right arm   Patient Position: Sitting   Pulse: 74   Resp: 18   Temp: 98.3 °F (36.8 °C)   TempSrc: Oral   SpO2: 98%   Weight: 90.7 kg (200 lb)   Height: 5' 7" (1.702 m)       Pertinent physical exam: Non-toxic appearing, no respiratory distress, no focal neurologic deficits, ambulatory without difficulty or assistance.       Brief workup plan:  Lumbar xrays    Preliminary workup initiated; this workup will be continued and followed by the physician or advanced practice provider that is assigned to the patient when roomed.  "

## 2022-08-23 NOTE — Clinical Note
"Nina Mcmullenrenny Cuba was seen and treated in our emergency department on 8/23/2022.  She may return to work on 08/26/2022.       If you have any questions or concerns, please don't hesitate to call.      Ray Quezada PA-C"

## 2022-08-24 NOTE — DISCHARGE INSTRUCTIONS

## 2022-08-24 NOTE — ED PROVIDER NOTES
Encounter Date: 8/23/2022       History     Chief Complaint   Patient presents with    Back Pain     Pt states that she has lower back pain after moving a shelf by herself yesterday at work. Pt ambulatory without difficulty and took aleve at home.     57-year-old female with no past medical history presents to ED complaining of acute onset of lumbar back pain after moving a heavy shelf at 4:15 p.m. today.  Patient reports a pulling sensation to her back that is worse with movement.  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  Patient attempted two Aleves prior to arrival with mild relief.  Patient denies any saddle anesthesia, bladder/bowel incontinence, fever, chills, chest pain, shortness of breath, abdominal pain, nausea, vomiting, diarrhea, dysuria.  No other symptoms reported.    The history is provided by the patient. No  was used.     Review of patient's allergies indicates:  No Known Allergies  Past Medical History:   Diagnosis Date    Back problem     GERD (gastroesophageal reflux disease)     Obesity (BMI 30-39.9)      Past Surgical History:   Procedure Laterality Date    TONSILLECTOMY       Family History   Problem Relation Age of Onset    Diabetes Mother     Hypertension Mother     Lymphoma Father     Kidney cancer Brother     Heart disease Maternal Grandfather     Stroke Neg Hx      Social History     Tobacco Use    Smoking status: Former Smoker    Smokeless tobacco: Never Used   Substance Use Topics    Alcohol use: Yes     Comment: rare    Drug use: Never     Review of Systems   Constitutional: Negative for chills and fever.   HENT: Negative for congestion, ear pain, rhinorrhea and sore throat.    Eyes: Negative for redness.   Respiratory: Negative for cough and shortness of breath.    Cardiovascular: Negative for chest pain.   Gastrointestinal: Negative for abdominal pain, diarrhea, nausea and vomiting.    Genitourinary: Negative for decreased urine volume, difficulty urinating, dysuria, frequency, hematuria and urgency.   Musculoskeletal: Positive for back pain. Negative for neck pain.   Skin: Negative for rash.   Neurological: Negative for headaches.   Psychiatric/Behavioral: Negative for confusion.       Physical Exam     Initial Vitals [08/23/22 1813]   BP Pulse Resp Temp SpO2   (!) 157/92 74 18 98.3 °F (36.8 °C) 98 %      MAP       --         Physical Exam    Nursing note and vitals reviewed.  Constitutional: She appears well-developed and well-nourished.  Non-toxic appearance. She does not appear ill.   HENT:   Head: Normocephalic and atraumatic.   Mouth/Throat: Mucous membranes are normal.   Eyes: EOM are normal.   Neck: Neck supple.   Normal range of motion.   Full passive range of motion without pain.     Cardiovascular: Normal rate and regular rhythm.   Pulses:       Radial pulses are 2+ on the right side and 2+ on the left side.   Pulmonary/Chest: Effort normal and breath sounds normal. No respiratory distress.   Abdominal: Abdomen is soft. Bowel sounds are normal. She exhibits no distension. There is no abdominal tenderness.   Musculoskeletal:         General: Normal range of motion.      Cervical back: Full passive range of motion without pain, normal range of motion and neck supple.      Comments: No midline tenderness to cervical, thoracic, lumbar spine.  No bony step-offs.  Full range of motion of bilateral upper and lower extremities.  No evidence of any erythema, edema, bruising, cellulitis, rash to patient's back.     Neurological: She is alert.   Strength and sensation intact to bilateral upper and lower extremities.   Skin: Skin is warm and dry.   Psychiatric: She has a normal mood and affect.         ED Course   Procedures  Labs Reviewed - No data to display       Imaging Results          X-Ray Lumbar Spine Ap And Lateral (Final result)  Result time 08/23/22 18:57:04    Final result by Andrés  MARISOL Restrepo MD (08/23/22 18:57:04)                 Impression:      No acute lumbar spine abnormalities identified.  Multilevel lumbar DJD, most pronounced at the L2-3 level.      Electronically signed by: Andrés Restrepo MD  Date:    08/23/2022  Time:    18:57             Narrative:    EXAMINATION:  XR LUMBAR SPINE AP AND LATERAL    CLINICAL HISTORY:  low back pain;    TECHNIQUE:  AP, lateral and spot images were performed of the lumbar spine.    COMPARISON:  May 2017.    FINDINGS:  There is mild levoscoliotic curvature the lumbar spine.  AP alignment appears within normal limits.  No evidence of acute lumbar spine fracture or subluxation.  Multilevel intervertebral disc space narrowing and degenerative changes are seen, most pronounced at the L2-3 level.  There is lower lumbar facet arthropathy.  Visualized sacrum is unremarkable.                                 Medications - No data to display  Medical Decision Making:   ED Management:  This is a 57-year-old female with no past medical history presents to ED complaining of acute onset of lumbar back pain after moving a heavy shelf at 4:15 p.m. today.  On physical exam, patient is well-appearing in no acute distress.  Nontoxic appearing.  Lungs are clear to auscultation bilaterally.  Abdomen is soft and nontender.  No guarding, rigidity, rebound.  Full range of motion of neck without any pain.  No midline tenderness to cervical, thoracic, lumbar spine.  No bony step-offs.  Full range of motion of bilateral upper and lower extremities.  No evidence of any erythema, edema, bruising, cellulitis, rash to patient's back. Strength and sensation intact to bilateral upper and lower extremities.  Patient able to ambulate into the room.  Lumbar x-ray revealed no acute lumbar spine abnormalities.  Will discharge patient on ibuprofen, Norflex, and Lidoderm patches as needed for pain.  Urged prompt follow-up with PCP for further evaluation.    Strict return precautions given. I  discussed with the patient/family the diagnosis, treatment plan, indications for return to the emergency department, and for expected follow-up. The patient/family verbalized an understanding. The patient/family is asked if there are any questions or concerns. We discuss the case, until all issues are addressed to the patient/familys satisfaction. Patient/family understands and is agreeable to the plan. Patient is stable and ready for discharge.                       Clinical Impression:   Final diagnoses:  [S39.012A] Strain of lumbar region, initial encounter (Primary)          ED Disposition Condition    Discharge Stable        ED Prescriptions     Medication Sig Dispense Start Date End Date Auth. Provider    ibuprofen (ADVIL,MOTRIN) 600 MG tablet Take 1 tablet (600 mg total) by mouth every 6 (six) hours as needed for Pain. 20 tablet 8/23/2022  Ray Quezada PA-C    orphenadrine (NORFLEX) 100 mg tablet Take 1 tablet (100 mg total) by mouth 2 (two) times daily. for 10 days 20 tablet 8/23/2022 9/2/2022 Ray Quezada PA-C    LIDOcaine (LIDODERM) 5 % (Expires today) Place 1 patch onto the skin once. Remove & Discard patch within 12 hours or as directed by MD for 1 dose 15 patch 8/23/2022 8/23/2022 Ray Quezada PA-C        Follow-up Information     Follow up With Specialties Details Why Contact Info    Tomy Jackson, DO Internal Medicine In 2 days for further evaluation [address] [facility] [address]  [phone]      [facility] - Emergency Dept Emergency Medicine In 2 days If symptoms worsen [address] [place], [place] [address]  [facility] [id number] [phone]           Ray Quezada PA-C  08/23/22 2028

## 2022-09-08 ENCOUNTER — OFFICE VISIT (OUTPATIENT)
Dept: OBSTETRICS AND GYNECOLOGY | Facility: CLINIC | Age: 57
End: 2022-09-08
Payer: COMMERCIAL

## 2022-09-08 VITALS
BODY MASS INDEX: 32.35 KG/M2 | WEIGHT: 206.13 LBS | HEIGHT: 67 IN | SYSTOLIC BLOOD PRESSURE: 108 MMHG | DIASTOLIC BLOOD PRESSURE: 72 MMHG

## 2022-09-08 DIAGNOSIS — N89.8 VAGINAL DISCHARGE: Primary | ICD-10-CM

## 2022-09-08 DIAGNOSIS — N95.2 VAGINAL ATROPHY: ICD-10-CM

## 2022-09-08 DIAGNOSIS — R93.89 THICKENED ENDOMETRIUM: ICD-10-CM

## 2022-09-08 DIAGNOSIS — N89.8 VAGINA ITCHING: ICD-10-CM

## 2022-09-08 DIAGNOSIS — M54.9 BACK PAIN, UNSPECIFIED BACK LOCATION, UNSPECIFIED BACK PAIN LATERALITY, UNSPECIFIED CHRONICITY: ICD-10-CM

## 2022-09-08 DIAGNOSIS — N95.0 POSTMENOPAUSAL BLEEDING: ICD-10-CM

## 2022-09-08 LAB
BILIRUB SERPL-MCNC: NORMAL MG/DL
BLOOD URINE, POC: NORMAL
CLARITY, POC UA: CLEAR
COLOR, POC UA: NORMAL
GLUCOSE UR QL STRIP: NORMAL
KETONES UR QL STRIP: NORMAL
LEUKOCYTE ESTERASE URINE, POC: NORMAL
NITRITE, POC UA: NORMAL
PH, POC UA: 5
PROTEIN, POC: NORMAL
SPECIFIC GRAVITY, POC UA: 1.02
UROBILINOGEN, POC UA: NORMAL

## 2022-09-08 PROCEDURE — 87591 N.GONORRHOEAE DNA AMP PROB: CPT | Mod: 59 | Performed by: OBSTETRICS & GYNECOLOGY

## 2022-09-08 PROCEDURE — 81002 URINALYSIS NONAUTO W/O SCOPE: CPT | Mod: S$GLB,,, | Performed by: OBSTETRICS & GYNECOLOGY

## 2022-09-08 PROCEDURE — 87481 CANDIDA DNA AMP PROBE: CPT | Mod: 59 | Performed by: OBSTETRICS & GYNECOLOGY

## 2022-09-08 PROCEDURE — 81002 POCT URINE DIPSTICK WITHOUT MICROSCOPE: ICD-10-PCS | Mod: S$GLB,,, | Performed by: OBSTETRICS & GYNECOLOGY

## 2022-09-08 PROCEDURE — 99214 OFFICE O/P EST MOD 30 MIN: CPT | Mod: S$GLB,,, | Performed by: OBSTETRICS & GYNECOLOGY

## 2022-09-08 PROCEDURE — 87491 CHLMYD TRACH DNA AMP PROBE: CPT | Mod: 59 | Performed by: OBSTETRICS & GYNECOLOGY

## 2022-09-08 PROCEDURE — 99999 PR PBB SHADOW E&M-EST. PATIENT-LVL III: ICD-10-PCS | Mod: PBBFAC,,, | Performed by: OBSTETRICS & GYNECOLOGY

## 2022-09-08 PROCEDURE — 99214 PR OFFICE/OUTPT VISIT, EST, LEVL IV, 30-39 MIN: ICD-10-PCS | Mod: S$GLB,,, | Performed by: OBSTETRICS & GYNECOLOGY

## 2022-09-08 PROCEDURE — 99999 PR PBB SHADOW E&M-EST. PATIENT-LVL III: CPT | Mod: PBBFAC,,, | Performed by: OBSTETRICS & GYNECOLOGY

## 2022-09-08 RX ORDER — ESTRADIOL 0.1 MG/G
1 CREAM VAGINAL
Qty: 42.5 G | Refills: 3 | Status: SHIPPED | OUTPATIENT
Start: 2022-09-08 | End: 2023-09-08

## 2022-09-08 RX ORDER — LIDOCAINE 50 MG/G
1 PATCH TOPICAL DAILY
COMMUNITY
Start: 2022-08-23 | End: 2022-10-26

## 2022-09-08 NOTE — PROGRESS NOTES
"CC: vulvar itching   Vulvar irritation   Postmenopausal bleeding    Nina Cuba is a 57 y.o. female  presents for  vulvar itching and irritation. She has been scratching the vulvar area at times  She continues to have PMB on HRT  US showed in      Size: 7.8 x 3.5 x 4.4 cm     Masses: None     Endometrium: Abnormally thickened measuring 14 mm with increased vascularity and cysts.     Right ovary:     Size: 2.5 x 0.7 x 1.2 cm     Appearance: Normal     Vascular flow: Normal.     Left ovary:     Size: 1.9 x 0.6 x 1.3 cm     Appearance: Normal     Vascular Flow: Normal.     Free Fluid:     None.     Impression:     Findings concerning for endometrial neoplasm or hyperplasia.    EMBX was benign     Past Medical History:   Diagnosis Date    Back problem     GERD (gastroesophageal reflux disease)     Obesity (BMI 30-39.9)        Past Surgical History:   Procedure Laterality Date    TONSILLECTOMY         OB History    Para Term  AB Living   0 0 0 0 0 0   SAB IAB Ectopic Multiple Live Births   0 0 0 0 0       Family History   Problem Relation Age of Onset    Diabetes Mother     Hypertension Mother     Lymphoma Father     Kidney cancer Brother     Heart disease Maternal Grandfather     Stroke Neg Hx        Social History     Tobacco Use    Smoking status: Former    Smokeless tobacco: Never   Substance Use Topics    Alcohol use: Yes     Comment: rare    Drug use: Never       /72   Ht 5' 7" (1.702 m)   Wt 93.5 kg (206 lb 2.1 oz)   LMP 2018   BMI 32.28 kg/m²     ROS:  GENERAL: Denies weight gain or weight loss. Feeling well overall.   SKIN: Denies rash or lesions.   HEAD: Denies head injury or headache.   NODES: Denies enlarged lymph nodes.   CHEST: Denies chest pain or shortness of breath.   CARDIOVASCULAR: Denies palpitations or left sided chest pain.   ABDOMEN: No abdominal pain, constipation, diarrhea, nausea, vomiting or rectal bleeding.   URINARY: No frequency, dysuria, " hematuria, or burning on urination.  REPRODUCTIVE: See HPI.   BREASTS: The patient performs breast self-examination and denies pain, lumps, or nipple discharge.   HEMATOLOGIC: No easy bruisability or excessive bleeding.  MUSCULOSKELETAL: Denies joint pain or swelling.   NEUROLOGIC: Denies syncope or weakness.   PSYCHIATRIC: Denies depression, anxiety or mood swings.    Physical Exam:    APPEARANCE: Well nourished, well developed, in no acute distress.  AFFECT: WNL, alert and oriented x 3  SKIN: No acne or hirsutism  NECK: Neck symmetric without masses or thyromegaly  NODES: No inguinal, cervical, axillary, or femoral lymph node enlargement  CHEST: Good respiratory effect  ABDOMEN: Soft.  No tenderness or masses.  No hepatosplenomegaly.  No hernias  PELVIC: Normal external genitalia without lesions.  Normal hair distribution.  Adequate perineal body, normal urethral meatus.  Vagina moist and well rugated without lesions bloody  discharge.  Cervix pink, without lesions, discharge or tenderness.  No significant cystocele or rectocele.  Bimanual exam shows uterus to be normal size, regular, mobile and nontender.  Adnexa without masses or tenderness.    EXTREMITIES: No edema.    ASSESSMENT AND PLAN  1. Vaginal discharge  POCT urine dipstick without microscope    Urine culture    C. trachomatis/N. gonorrhoeae by AMP DNA [facility]; Cervix    Vaginosis Screen by DNA Probe      2. Vagina itching  POCT urine dipstick without microscope    Urine culture    C. trachomatis/N. gonorrhoeae by AMP DNA [facility]; Cervix    Vaginosis Screen by DNA Probe      3. Vaginal atrophy  estradioL (ESTRACE) 0.01 % (0.1 mg/gram) vaginal cream      4. Postmenopausal bleeding        5. Thickened endometrium        6. Back pain, unspecified back location, unspecified back pain laterality, unspecified chronicity          Will recommend for patient to follow up with PCP for possible CT scan of the abdomen and pelvis.  She has family history of renal  cancer ( brother and other fam member with renal cancer    Vaginitis prevention including :   a. avoiding feminine products such as deoderant soaps, body wash, bubble bath, douches, scented toilet paper, deoderant tampons or pads, baby or feminine wipes, chronic pad use, etc. and   b. avoiding other vulvovaginal irritants such as long hot baths, humidity, tight, synthetic clothing, chlorine and sitting around in wet bathing suits and   c. wearing cotton underwear, avoiding thong underwear and no underwear to bed and   d. taking showers instead of baths and use a hair dryer on cool setting afterwards to dry and   e.wearing cotton to exercise and shower immediately after exercise and change clothes and   f. using polyurethane condoms without spermicide if sexually active and symptoms are triggered by intercourse;   Diflucan and Mycolog cream use and potential side effects;   -pelvic rest until symptoms resolve.     Will plan for D & C hysteroscopy     Patient was counseled today on A.C.S. Pap guidelines and recommendations for yearly pelvic exams, mammograms and monthly self breast exams; to see her PCP for other health maintenance.     Follow up if symptoms worsen or fail to improve.

## 2022-09-09 LAB
C TRACH DNA SPEC QL NAA+PROBE: NOT DETECTED
N GONORRHOEA DNA SPEC QL NAA+PROBE: NOT DETECTED

## 2022-09-12 LAB
BACTERIAL VAGINOSIS DNA: POSITIVE
CANDIDA GLABRATA DNA: NEGATIVE
CANDIDA KRUSEI DNA: NEGATIVE
CANDIDA RRNA VAG QL PROBE: NEGATIVE
T VAGINALIS RRNA GENITAL QL PROBE: NEGATIVE

## 2022-09-13 ENCOUNTER — TELEPHONE (OUTPATIENT)
Dept: INTERNAL MEDICINE | Facility: CLINIC | Age: 57
End: 2022-09-13
Payer: COMMERCIAL

## 2022-09-13 DIAGNOSIS — R39.9 UTI SYMPTOMS: Primary | ICD-10-CM

## 2022-09-13 RX ORDER — METRONIDAZOLE 500 MG/1
500 TABLET ORAL EVERY 12 HOURS
Qty: 14 TABLET | Refills: 0 | Status: SHIPPED | OUTPATIENT
Start: 2022-09-13 | End: 2022-09-20

## 2022-09-13 NOTE — TELEPHONE ENCOUNTER
----- Message from Yessica Rothman sent at 9/13/2022  8:40 AM CDT -----  Name of Who is Calling: DAGMAR RENTERIA [[id number]]           What is the request in detail: Patient is requesting an antibiotic after viewing test results.  Please assist.           Can the clinic reply by [facility]: No           What Number to Call Back if not in [facility]: [phone]

## 2022-09-13 NOTE — TELEPHONE ENCOUNTER
----- Message from Daryn Frias sent at 9/13/2022  9:44 AM CDT -----  Regarding: pt call back  Contact: DAGMAR RENTERIA [[id number]]  Type:  Patient Returning Call    Who Called: DAGMAR RENTERIA [[id number]]    Who Left Message for Patient: Anita    Does the patient know what this is regarding?: Yes    Would the patient rather a call back or a response via [facility]?     Best Call Back Number:[phone]    Additional Information:

## 2022-09-13 NOTE — TELEPHONE ENCOUNTER
----- Message from Caitlin Scott sent at 9/13/2022  8:35 AM CDT -----  Contact: pt [phone]  Pt was seen by Carolina Jeong MD on 9/8/2022, she states that her provider sent  clinic notes from visit and is requesting her have testing, she would like a call back from nurse to further discuss. Please advise

## 2022-09-16 ENCOUNTER — TELEPHONE (OUTPATIENT)
Dept: URGENT CARE | Facility: CLINIC | Age: 57
End: 2022-09-16
Payer: COMMERCIAL

## 2022-09-19 DIAGNOSIS — R10.13 EPIGASTRIC PAIN: Primary | ICD-10-CM

## 2022-09-19 DIAGNOSIS — Z12.11 SPECIAL SCREENING FOR MALIGNANT NEOPLASMS, COLON: ICD-10-CM

## 2022-09-19 RX ORDER — DICYCLOMINE HYDROCHLORIDE 20 MG/1
20 TABLET ORAL EVERY 6 HOURS
Qty: 120 TABLET | Refills: 0 | Status: SHIPPED | OUTPATIENT
Start: 2022-09-19 | End: 2022-10-19

## 2022-09-19 RX ORDER — PANTOPRAZOLE SODIUM 40 MG/1
40 TABLET, DELAYED RELEASE ORAL DAILY
Qty: 90 TABLET | Refills: 0 | Status: SHIPPED | OUTPATIENT
Start: 2022-09-19 | End: 2022-12-20 | Stop reason: SDUPTHER

## 2022-09-19 NOTE — TELEPHONE ENCOUNTER
Spoke to pt regarding message below.   Informed pt per Dr. Roberson recommendations at last visit: EGD for pain with H pylori bx (father had similar erosion history) and colon for screening.     Pt agree to schedule procedure egd/colon and request refills for medications bentyl and protonix to be sent to [facility] [address] in [place]     Message routed to Dr. Roberson to advise   ----- Message from Carolina Blanchard sent at 9/19/2022  1:53 PM CDT -----  Name of Caller pt   Reason for Visit/Symptoms pt requesting to get an appt with dr roberson. She states she had a virtual but don't see anything. She is asking for appt and refills. Call pt   Best Contact Number or Confirm if Mychart Preferred [phone] (home)     Preferred Date/Time of Appointment asap   Interested in Virtual Visit (yes/no)  Additional Information

## 2022-09-23 ENCOUNTER — TELEPHONE (OUTPATIENT)
Dept: GASTROENTEROLOGY | Facility: CLINIC | Age: 57
End: 2022-09-23
Payer: COMMERCIAL

## 2022-09-23 DIAGNOSIS — Z12.11 SPECIAL SCREENING FOR MALIGNANT NEOPLASMS, COLON: Primary | ICD-10-CM

## 2022-09-23 DIAGNOSIS — R10.13 EPIGASTRIC PAIN: ICD-10-CM

## 2022-09-23 RX ORDER — POLYETHYLENE GLYCOL 3350, SODIUM SULFATE ANHYDROUS, SODIUM BICARBONATE, SODIUM CHLORIDE, POTASSIUM CHLORIDE 236; 22.74; 6.74; 5.86; 2.97 G/4L; G/4L; G/4L; G/4L; G/4L
4 POWDER, FOR SOLUTION ORAL ONCE
Qty: 4000 ML | Refills: 0 | Status: SHIPPED | OUTPATIENT
Start: 2022-09-23 | End: 2022-09-23

## 2022-09-23 NOTE — TELEPHONE ENCOUNTER
----- Message from Shannon Skelton MA sent at 9/22/2022  4:46 PM CDT -----  Contact: @[phone]  Patient didn't want to schedule an (endo  appt) patient will like to schedule egd/colon soon and at the [facility] location. Please contact patient to schedule. Thanks   ----- Message -----  From: Dg Donahue  Sent: 9/22/2022   2:56 PM CDT  To: [facility] Staff    Pt is calling in to see if Dr. Roberson does the colonoscopy and endoscopy at the [facility] and if not she would like to schedule her procedure. Please call to discuss further.

## 2022-09-30 ENCOUNTER — TELEPHONE (OUTPATIENT)
Dept: URGENT CARE | Facility: CLINIC | Age: 57
End: 2022-09-30
Payer: COMMERCIAL

## 2022-09-30 NOTE — TELEPHONE ENCOUNTER
Pt called with claim information.  States her work comp claim has been filed with [employer].  Voice mail left for Soni Ornelas with [employer] regarding.

## 2022-10-03 ENCOUNTER — TELEPHONE (OUTPATIENT)
Dept: URGENT CARE | Facility: CLINIC | Age: 57
End: 2022-10-03
Payer: COMMERCIAL

## 2022-10-10 RX ORDER — LORAZEPAM 0.5 MG/1
TABLET ORAL
Qty: 30 TABLET | Refills: 0 | Status: SHIPPED | OUTPATIENT
Start: 2022-10-10 | End: 2022-11-29 | Stop reason: SDUPTHER

## 2022-10-12 ENCOUNTER — OFFICE VISIT (OUTPATIENT)
Dept: URGENT CARE | Facility: CLINIC | Age: 57
End: 2022-10-12
Payer: OTHER MISCELLANEOUS

## 2022-10-12 VITALS
OXYGEN SATURATION: 96 % | HEART RATE: 65 BPM | HEIGHT: 65 IN | SYSTOLIC BLOOD PRESSURE: 116 MMHG | RESPIRATION RATE: 18 BRPM | TEMPERATURE: 99 F | BODY MASS INDEX: 33.66 KG/M2 | WEIGHT: 202 LBS | DIASTOLIC BLOOD PRESSURE: 76 MMHG
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
## 2022-10-12 DIAGNOSIS — Z02.6 ENCOUNTER RELATED TO WORKER'S COMPENSATION CLAIM: ICD-10-CM

## 2022-10-12 DIAGNOSIS — S39.012A ACUTE MYOFASCIAL STRAIN OF LUMBAR REGION, INITIAL ENCOUNTER: Primary | ICD-10-CM

## 2022-10-12 PROCEDURE — 99203 OFFICE O/P NEW LOW 30 MIN: CPT | Mod: S$GLB,,, | Performed by: PHYSICIAN ASSISTANT

## 2022-10-12 PROCEDURE — 99203 PR OFFICE/OUTPT VISIT, NEW, LEVL III, 30-44 MIN: ICD-10-PCS | Mod: S$GLB,,, | Performed by: PHYSICIAN ASSISTANT

## 2022-10-12 RX ORDER — IBUPROFEN 600 MG/1
600 TABLET ORAL EVERY 8 HOURS PRN
Qty: 60 TABLET | Refills: 0 | Status: SHIPPED | OUTPATIENT
Start: 2022-10-12 | End: 2022-11-22 | Stop reason: SDUPTHER

## 2022-10-12 RX ORDER — POLYETHYLENE GLYCOL-3350 AND ELECTROLYTES 236; 6.74; 5.86; 2.97; 22.74 G/274.31G; G/274.31G; G/274.31G; G/274.31G; G/274.31G
4000 POWDER, FOR SOLUTION ORAL
Status: ON HOLD | COMMUNITY
Start: 2022-09-23 | End: 2022-11-03 | Stop reason: HOSPADM

## 2022-10-12 NOTE — PROGRESS NOTES
Subjective:       Patient ID: Nina Cuba is a 57 y.o. female.    Chief Complaint: Back Pain    New WC Back Pain ( DOI 08-22-22 ) Works for [employer] as a . While on a ladder ajusting a shelf, the rebar of the shelf fell down causing her to lunge forward, causing pain. She went to [facility] for treatment. Pain score can be from 6-10/10 with complaints of Constant Aching pain, Intermittent Stabbing/Burning pain w/certain movements, Stiffness, ROM poor, Numbness/Tingling of finger tips of both hands. Using Lidocaine pain patches. SH    57-year-old female  for [employer] presents with low back pain since 08/22/2022.  MO as above.  Reports pain is across the low back region right > left without radicular symptoms of bilateral lower extremities.  Denies previous low back injuries.  Denies bowel or bladder incontinence or saddle anesthesia.  Says she has been using lidocaine patches and ibuprofen with mild relief. MEB        Back Pain  The current episode started more than 1 month ago. The problem occurs constantly. Pertinent negatives include no abdominal pain, bladder incontinence, bowel incontinence, chest pain, fever, numbness or paresthesias. She has tried NSAIDs and heat for the symptoms. The treatment provided mild relief.   Constitution: Negative for chills and fever.   HENT:  Negative for facial trauma.    Neck: Negative for neck pain and neck stiffness.   Cardiovascular:  Negative for chest pain.   Eyes:  Negative for eye trauma and eye pain.   Respiratory:  Negative for shortness of breath.    Gastrointestinal:  Negative for abdominal trauma, abdominal pain, nausea, vomiting and bowel incontinence.   Genitourinary:  Negative for bladder incontinence.   Musculoskeletal:  Positive for pain, back pain and muscle ache. Negative for trauma, joint pain, joint swelling, abnormal ROM of joint and muscle cramps.   Skin:  Negative for color change, wound,  abrasion and laceration.   Neurological:  Positive for tingling. Negative for numbness.   Psychiatric/Behavioral:  Negative for nervous/anxious. The patient is not nervous/anxious.       Objective:      Physical Exam  Vitals and nursing note reviewed.   Constitutional:       General: She is not in acute distress.     Appearance: Normal appearance. She is well-developed.   HENT:      Head: Normocephalic and atraumatic.      Right Ear: Hearing and external ear normal.      Left Ear: Hearing and external ear normal.      Nose: Nose normal. No nasal deformity.   Eyes:      General: Lids are normal.      Conjunctiva/sclera: Conjunctivae normal.      Right eye: Right conjunctiva is not injected.      Left eye: Left conjunctiva is not injected.   Neck:      Trachea: Trachea normal.   Cardiovascular:      Pulses: Normal pulses.           Dorsalis pedis pulses are 2+ on the right side and 2+ on the left side.        Posterior tibial pulses are 2+ on the right side and 2+ on the left side.   Pulmonary:      Effort: Pulmonary effort is normal. No respiratory distress.      Breath sounds: No stridor.   Musculoskeletal:      Cervical back: Normal and normal range of motion. No spinous process tenderness or muscular tenderness.      Thoracic back: Normal.      Lumbar back: Spasms and tenderness present. No deformity. Decreased range of motion. Negative right straight leg raise test and negative left straight leg raise test.        Back:    Skin:     General: Skin is warm and dry.      Findings: No abrasion or bruising.   Neurological:      Mental Status: She is alert.      GCS: GCS eye subscore is 4. GCS verbal subscore is 5. GCS motor subscore is 6.      Sensory: No sensory deficit.      Motor: Motor function is intact. No weakness (BLE strength 5/5).      Deep Tendon Reflexes: Reflexes are normal and symmetric.      Reflex Scores:       Patellar reflexes are 2+ on the right side and 2+ on the left side.       Achilles reflexes  are 2+ on the right side and 2+ on the left side.  Psychiatric:         Attention and Perception: She is attentive.         Speech: Speech normal.         Behavior: Behavior normal.         Thought Content: Thought content normal.       Assessment:       1. Acute myofascial strain of lumbar region, initial encounter    2. Encounter related to worker's compensation claim    3. Work related injury          Plan:         Medications Ordered This Encounter   Medications    ibuprofen (ADVIL,MOTRIN) 600 MG tablet     Sig: Take 1 tablet (600 mg total) by mouth every 8 (eight) hours as needed for Pain.     Dispense:  60 tablet     Refill:  0     Patient Instructions: Daily home exercises/warm soaks, PT to be scheduled once authorized   Restrictions: Regular Duty  Follow up in about 2 weeks (around 10/26/2022).

## 2022-10-12 NOTE — LETTER
[facility]  [address] [facility] [address]  Phone: [phone]  Fax: [phone]  [facility]: [email or web address]    Pt Name: Nina Cuba  Injury Date: 08/22/2022   Employee ID:  Date of First Treatment: 10/12/2022   Company: [employer]      Appointment Time:  Arrived: 11:38 AM   Provider: Boston Cuba PA-C Time Out:01:10 PM     Office Treatment:   1. Acute myofascial strain of lumbar region, initial encounter    2. Encounter related to worker's compensation claim    3. Work related injury      Medications Ordered This Encounter   Medications    ibuprofen (ADVIL,MOTRIN) 600 MG tablet      Patient Instructions: Daily home exercises/warm soaks, PT to be scheduled once authorized      Restrictions: Regular Duty     Return Appointment: 10/26/2022 at 10:30 AM     SH

## 2022-10-20 ENCOUNTER — CLINICAL SUPPORT (OUTPATIENT)
Dept: REHABILITATION | Facility: HOSPITAL | Age: 57
End: 2022-10-20
Payer: OTHER MISCELLANEOUS

## 2022-10-20 DIAGNOSIS — M54.50 ACUTE BILATERAL LOW BACK PAIN WITHOUT SCIATICA: Primary | ICD-10-CM

## 2022-10-20 DIAGNOSIS — M53.86 DECREASED ROM OF LUMBAR SPINE: ICD-10-CM

## 2022-10-20 DIAGNOSIS — R29.898 WEAKNESS OF BOTH LOWER EXTREMITIES: ICD-10-CM

## 2022-10-20 PROCEDURE — 97161 PT EVAL LOW COMPLEX 20 MIN: CPT

## 2022-10-20 NOTE — PLAN OF CARE
[facility]  Physical Therapy Initial Evaluation     Name: Nina Cuba  Clinic Number: [id number]    Therapy Diagnosis:   Encounter Diagnoses   Name Primary?    Acute bilateral low back pain without sciatica Yes    Decreased ROM of lumbar spine     Weakness of both lower extremities      Physician: Boston Cuba PA-C    Physician Orders: PT Eval and Treat   Medical Diagnosis from Referral:   S39.012A (ICD-10-CM) - Acute myofascial strain of lumbar region, initial encounter   Y99.0 (ICD-10-CM) - Work related injury   Evaluation Date: 10/20/2022  Authorization Period Expiration: 10/12/2022  Plan of Care Expiration: 12/31/022  Progress Note Due: 11/20/2022  Visit # / Visits authorized: 1/ 12    Time In: 1053  Time Out: 1130  Total Appointment Time (timed & untimed codes): 38 minutes    Precautions: Standard    Subjective   Date of injury: 8/22/2022  History of current condition - Nina reports: she was standing on a three step ladder and she was trying to move a shelf. Patient reports the shelf slipped and went to grab the shelf and felt a pull immediately. Patient reports since the injury her symptoms have hit a plateau. Patient reports she is working full duty, but it is affecting her. Patient reports she is a  and she is responsible for stocking shelves. Patient reports her symptoms bother her with twisting and lifting. Patient reports she does not have an issue with walking or sitting for a prolonged period. Patient reports she also works for an [employer].      Occupation (including job description if provided): [employer]    Job Demands: Lifting, Twisting, Bending, Walking,   Prior Medical Treatment: Medication and Imaging  Current Work Restrictions: Regular Duty     Medical History:   Past Medical History:   Diagnosis Date    Back problem     GERD (gastroesophageal reflux disease)     Obesity (BMI 30-39.9)        Surgical History:   Nina Mock  Bereket  has a past surgical history that includes Tonsillectomy.    Medications:   Nina has a current medication list which includes the following prescription(s): estradiol, estradiol, gabapentin, gavilyte-g, ibuprofen, lidocaine, lorazepam, meloxicam, pantoprazole, progesterone, and tizanidine.    Allergies:   Review of patient's allergies indicates:  No Known Allergies     Imaging, xrays:     Social History:  lives alone, difficulty with house chores     Pain:  Current 0/10, worst 9/10, best 0/10   - worst was with twisting   Location: R lower back pain   Description: Aching, Tight, Sharp, and Hot  Aggravating Factors: see subjective   Alleviating Factors: pain medication and rest    Pt's goals: Reduce back pain, be able to work and perform adls without functional deficits              Objective         Lumbar ROM: (measured in degrees)   Degrees Quality   Flexion 5 deg   Increased pain (worst)    Extension 15 deg   Increased pain    Left Side Bending 10 deg  Increased pain    Right Side Bending 15 deg Increased pain   Left rotation 75% limited Increased Pain   Right Rotation 25% limited      Active/Passive Hip ROM: (measured in degrees)    RLE LLE   Flexion WNL WNL   Abduction WNL WNL   Extension WNL WNL   ER WNL WNL   IR WNL WNL         Lower Extremity Strength (graded 0-5 out of 5)   RLE LLE   Hip flexion: 4/5 4/5   Knee extension: 4+/5 4+/5   Ankle dorsiflexion: 4+/5 4+/5   Posterior fibers of Gluteus medius 4/5 4/5   Knee flexion 4+/5 4+/5   Glute max 4/5 4/5   Ankle plantarflexion 4+/5 4+/5     Special Tests: ((+): pos.; (-): neg.)   Quadrant test:  +   Slump Test: -   SLR Test: -   Bridge Test: +    Pirformis Test: -      Flexibility:   Tightness bilateral hamstrings    Functional Job Specific Testing:     Job Specific Task Job Demands Current Ability Deficit? (Yes or No)   Lifting objects to chest Stocking shelves Able  yes   2. Lifting objects over head  Stocking shelves Able  yes   3. Walking Walking   Able None     Limitation/Restriction for FOTO Lumbar Survey    Therapist reviewed FOTO scores for Nina Cuba on 10/20/2022.   FOTO documents entered into Press - see Media section.    Limitation Score: 59%       TREATMENT       Home Exercises and Patient Education Provided    Education provided:   - Purpose of PT       Assessment   Nina is a 57 y.o. female referred to outpatient Physical Therapy with a medical diagnosis of acute myofascial strain of low back. Pt presents with 2 month history of back pain. Patient presents with increased lower back pain with lumbar range of motion. Patients range of motion was limited in all directions, but primarily with flexion. However, when patient was asked to bend forward to  a box, her lumbar flexion increased with less attention to the lumbar flexion specific task. Patient has lower extremity weakness bilaterally as well. Patient did well with box lifts, but could only tolerate 10lbs. Patient could not tolerate 20lbs of box lifting due to fear and pain.       The patient's current job specific task deficits include the following:  and [employer]    Pt prognosis is Good.     Skilled Physical Therapy intervention is required at this time for the injured worker to address the musculoskeletal limitations and work-related functional deficits for their job as a .    Plan of care discussed with patient: Yes  Pt's spiritual, cultural and educational needs considered and patient is agreeable to the plan of care and goals as stated below:     Anticipated Barriers for therapy: self-limiting behaviors due to pain    Medical Necessity is demonstrated by the following  History  Co-morbidities and personal factors that may impact the plan of care Co-morbidities:   high BMI    Personal Factors:   no deficits     moderate   Examination  Body Structures and Functions, activity limitations and participation restrictions that may impact the plan of care  Body Regions:   back  lower extremities    Body Systems:    ROM  strength    Participation Restrictions:   None    Activity limitations:   Learning and applying knowledge  no deficits    General Tasks and Commands  no deficits    Communication  no deficits    Mobility  no deficits    Self care  no deficits    Domestic Life  shopping  doing house work (cleaning house, washing dishes, laundry)    Interactions/Relationships  no deficits    Life Areas  no deficits    Community and Social Life  recreation and leisure         high   Clinical Presentation stable and uncomplicated low   Decision Making/ Complexity Score: low     Goals:     Short Term Goals (3 Weeks):  1. Pt will be compliant with HEP to supplement PT in decreasing pain with functional mobility.  2. Pt will perform pallof press with good control to demonstrate improved core strength.  3. Pt will improve lumbar ROM by 10 degrees in all abnormal planes to improve functional mobility.  4. Pt will improve impaired LE MMTs by 1/2 score to improve strength for functional tasks.  Long Term Goals (6 Weeks):   1. Pt will improve FOTO score to </= 45% limited to decrease perceived limitation with maintaining/changing body position.   2. Pt will perform box lift to overhead with 20lbs with good control to demonstrate improved core strength.  3. Pt will improve impaired LE MMTs by 1 score to improve strength for functional tasks.  4. Pt will report no pain during lumbar ROM to promote functional mobility.       Plan   Plan of care Certification: 10/20/2022 to 12/31/2022.    Outpatient Physical Therapy 2 times weekly for 6 weeks to include the following interventions: Cervical/Lumbar Traction, Manual Therapy, Moist Heat/ Ice, Neuromuscular Re-ed, Patient Education, Self Care, Therapeutic Activities, Therapeutic Exercise, and FDN.     Upon discharge from further skilled PT intervention it will determined if the need for a work conditioning program or Functional Capacity  Evaluation is required to allow the injured worker to return to work with full potential achieved, continued improvement with body mechanics with advanced functional activities, and further prevention of future work-related injuries.     Tin Irene, PT  10/20/2022

## 2022-10-24 ENCOUNTER — CLINICAL SUPPORT (OUTPATIENT)
Dept: REHABILITATION | Facility: HOSPITAL | Age: 57
End: 2022-10-24
Payer: OTHER MISCELLANEOUS

## 2022-10-24 DIAGNOSIS — M53.86 DECREASED ROM OF LUMBAR SPINE: ICD-10-CM

## 2022-10-24 PROCEDURE — 97110 THERAPEUTIC EXERCISES: CPT | Mod: CQ

## 2022-10-24 NOTE — PROGRESS NOTES
[facility]   Physical Therapy Treatment Note     Name: Nina Cuba  Clinic Number: [id number]    Therapy Diagnosis:   Encounter Diagnosis   Name Primary?    Decreased ROM of lumbar spine      Physician: Boston Cuba PA-C    Visit Date: 10/24/2022  Physician: Boston Cuba PA-C     Physician Orders: PT Eval and Treat   Medical Diagnosis from Referral:   S39.012A (ICD-10-CM) - Acute myofascial strain of lumbar region, initial encounter   Y99.0 (ICD-10-CM) - Work related injury     Evaluation Date: 10/20/2022  Authorization Period Expiration: 10/12/2023  Plan of Care Expiration: 12/31/022  Progress Note Due: 11/20/2022  Visit # / Visits authorized: 1/ 12       Precautions: Standard      PTA Visit #: 1/5     Time In: 1:45 pm  Time Out: 2:30  Total Billable Time: 45 minutes    SUBJECTIVE     Pt reports: Not having pain just stiffness.  She  not issued at time of evaluation  compliant with home exercise program.  Response to previous treatment: no adverse affect  Functional change: in progress    Pain: 2/10  Location: left back      OBJECTIVE     Objective Measures updated at progress report unless specified.     Treatment     Nina received the treatments listed below:      therapeutic exercises to develop strength, endurance, ROM, flexibility, posture, and core stabilization for 45 minutes including:  Nu Step x 5 min  B HS stretch strap 3x30s  B piriformis 3x30s  LTR x 15 5s  DKTC x 15 5s  SL Clams 2x10  SL hip abd 2x10      hot pack for 30 minutes to lumbar paraspinals.            Patient Education and Home Exercises     Home Exercises Provided and Patient Education Provided     Education provided:   - HEP    Written Home Exercises Provided: yes. Exercises were reviewed and Nina was able to demonstrate them prior to the end of the session.  Nina demonstrated good  understanding of the education provided. See EMR under Patient Instructions for exercises provided during therapy  sessions    ASSESSMENT     Patient tolerated first therapy session after initial evaluation without c/o pain. Treatment to focus on core and hip strength as well as improving spinal mobility. MHP applied to lumbar area while patient performed therapeutic exercises. Patient reported that she felt much loser after session.    Nina Is progressing well towards her goals.   Pt prognosis is Good.     Pt will continue to benefit from skilled outpatient physical therapy to address the deficits listed in the problem list box on initial evaluation, provide pt/family education and to maximize pt's level of independence in the home and community environment.     Pt's spiritual, cultural and educational needs considered and pt agreeable to plan of care and goals.     Anticipated barriers to physical therapy: self-limiting behaviors due to pain    Goals: Short Term Goals (3 Weeks):  1. Pt will be compliant with HEP to supplement PT in decreasing pain with functional mobility.  2. Pt will perform pallof press with good control to demonstrate improved core strength.  3. Pt will improve lumbar ROM by 10 degrees in all abnormal planes to improve functional mobility.  4. Pt will improve impaired LE MMTs by 1/2 score to improve strength for functional tasks.  Long Term Goals (6 Weeks):   1. Pt will improve FOTO score to </= 45% limited to decrease perceived limitation with maintaining/changing body position.   2. Pt will perform box lift to overhead with 20lbs with good control to demonstrate improved core strength.  3. Pt will improve impaired LE MMTs by 1 score to improve strength for functional tasks.  4. Pt will report no pain during lumbar ROM to promote functional mobility.     PLAN     Plan of care Certification: 10/20/2022 to 12/31/2022.     Outpatient Physical Therapy 2 times weekly for 6 weeks to include the following interventions: Cervical/Lumbar Traction, Manual Therapy, Moist Heat/ Ice, Neuromuscular Re-ed, Patient  Education, Self Care, Therapeutic Activities, Therapeutic Exercise, and FDN.      Upon discharge from further skilled PT intervention it will determined if the need for a work conditioning program or Functional Capacity Evaluation is required to allow the injured worker to return to work with full potential achieved, continued improvement with body mechanics with advanced functional activities, and further prevention of future work-related injuries.        Boston Guerrier, PTA

## 2022-10-26 ENCOUNTER — OFFICE VISIT (OUTPATIENT)
Dept: URGENT CARE | Facility: CLINIC | Age: 57
End: 2022-10-26
Payer: OTHER MISCELLANEOUS

## 2022-10-26 VITALS
TEMPERATURE: 97 F | WEIGHT: 202 LBS | DIASTOLIC BLOOD PRESSURE: 81 MMHG | SYSTOLIC BLOOD PRESSURE: 121 MMHG | BODY MASS INDEX: 33.66 KG/M2 | RESPIRATION RATE: 18 BRPM | OXYGEN SATURATION: 100 % | HEIGHT: 65 IN | HEART RATE: 74 BPM
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
## 2022-10-26 DIAGNOSIS — S39.012D ACUTE MYOFASCIAL STRAIN OF LUMBAR REGION, SUBSEQUENT ENCOUNTER: Primary | ICD-10-CM

## 2022-10-26 PROCEDURE — 99213 PR OFFICE/OUTPT VISIT, EST, LEVL III, 20-29 MIN: ICD-10-PCS | Mod: S$GLB,,, | Performed by: PHYSICIAN ASSISTANT

## 2022-10-26 PROCEDURE — 99213 OFFICE O/P EST LOW 20 MIN: CPT | Mod: S$GLB,,, | Performed by: PHYSICIAN ASSISTANT

## 2022-10-26 RX ORDER — LIDOCAINE 50 MG/G
1 PATCH TOPICAL DAILY
Qty: 30 PATCH | Refills: 0 | Status: SHIPPED | OUTPATIENT
Start: 2022-10-26 | End: 2022-11-25

## 2022-10-26 RX ORDER — TIZANIDINE 4 MG/1
4 TABLET ORAL 2 TIMES DAILY PRN
Qty: 20 TABLET | Refills: 0 | Status: SHIPPED | OUTPATIENT
Start: 2022-10-26 | End: 2023-09-18

## 2022-10-26 NOTE — PROGRESS NOTES
Subjective:       Patient ID: Nina Cuba is a 57 y.o. female.    Chief Complaint: No chief complaint on file.    HPI  ROS     Objective:      Physical Exam    Assessment:       No diagnosis found.    Plan:                   No follow-ups on file.

## 2022-10-26 NOTE — PROGRESS NOTES
Subjective:       Patient ID: Nina Cuba is a 57 y.o. female.    Chief Complaint: No chief complaint on file.    New WC Back Pain ( DOI 08-22-22 ) Works for [employer] as a . She started PT 10/24/22  and she has little pain and stiffness. She also taken IB as needed .LM    Pt for follow-up low back strain.  She reports mild improvement since last office visit.  She has done 2 physical therapy visits and was given a home exercise program.  She denies radicular symptoms.  She requests lidocaine patches which she has tried in the past with improvement of pain.    Back Pain  This is a new problem. The current episode started more than 1 month ago. The problem occurs constantly. The symptoms are aggravated by bending. Pertinent negatives include no abdominal pain, bladder incontinence, bowel incontinence, chest pain, fever, numbness or paresthesias. She has tried NSAIDs and heat for the symptoms. The treatment provided mild relief.     Constitution: Positive for activity change and generalized weakness. Negative for fever.   Cardiovascular:  Negative for chest pain.   Gastrointestinal:  Negative for abdominal pain and bowel incontinence.   Genitourinary:  Negative for bladder incontinence.   Musculoskeletal:  Positive for pain and back pain.   Neurological:  Negative for numbness.   Psychiatric/Behavioral:  Positive for sleep disturbance.       Objective:      Physical Exam  Vitals and nursing note reviewed.   Constitutional:       General: She is not in acute distress.     Appearance: Normal appearance. She is well-developed.   HENT:      Head: Normocephalic and atraumatic.      Right Ear: Hearing and external ear normal.      Left Ear: Hearing and external ear normal.      Nose: Nose normal. No nasal deformity.   Eyes:      General: Lids are normal.      Conjunctiva/sclera: Conjunctivae normal.      Right eye: Right conjunctiva is not injected.      Left eye: Left conjunctiva is not injected.    Neck:      Trachea: Trachea normal.   Cardiovascular:      Pulses: Normal pulses.           Dorsalis pedis pulses are 2+ on the right side and 2+ on the left side.        Posterior tibial pulses are 2+ on the right side and 2+ on the left side.   Pulmonary:      Effort: Pulmonary effort is normal. No respiratory distress.      Breath sounds: No stridor.   Musculoskeletal:      Cervical back: Normal and normal range of motion. No spinous process tenderness or muscular tenderness.      Thoracic back: Normal.      Lumbar back: Spasms and tenderness present. No deformity. Decreased range of motion. Negative right straight leg raise test and negative left straight leg raise test.        Back:    Skin:     General: Skin is warm and dry.      Findings: No abrasion or bruising.   Neurological:      Mental Status: She is alert.      GCS: GCS eye subscore is 4. GCS verbal subscore is 5. GCS motor subscore is 6.      Sensory: No sensory deficit.      Motor: Motor function is intact. No weakness (BLE strength 5/5).      Gait: Gait is intact.   Psychiatric:         Attention and Perception: She is attentive.         Speech: Speech normal.         Behavior: Behavior normal.         Thought Content: Thought content normal.       Assessment:       1. Acute myofascial strain of lumbar region, subsequent encounter    2. Work related injury        Plan:         Medications Ordered This Encounter   Medications    LIDOcaine (LIDODERM) 5 %     Sig: Place 1 patch onto the skin once daily. Remove & Discard patch within 12 hours or as directed by MD     Dispense:  30 patch     Refill:  0    tiZANidine (ZANAFLEX) 4 MG tablet     Sig: Take 1 tablet (4 mg total) by mouth 2 (two) times daily as needed (muscle spasms). Take off duty only. May cause drowsiness.     Dispense:  20 tablet     Refill:  0     Patient Instructions: Daily home exercises/warm soaks, Continue Physical Therapy   Restrictions: Regular Duty  Follow up in about 3 weeks  (around 11/16/2022).

## 2022-10-26 NOTE — LETTER
[facility]  [address] [address]  Phone: [phone]  Fax: [phone]  [facility]: [phone]    Pt Name: Nina Cuba  Injury Date: 08/22/2022   Employee ID: [id number] Date of Treatment: 10/26/2022   Company: [employer]      Appointment Time: 10:30 AM Arrived: 10:04 AM    Provider: Boston Cuba PA-C Time Out: 12:06 PM     Office Treatment:   1. Acute myofascial strain of lumbar region, subsequent encounter    2. Work related injury      Medications Ordered This Encounter   Medications    LIDOcaine (LIDODERM) 5 %    tiZANidine (ZANAFLEX) 4 MG tablet      Patient Instructions: Daily home exercises/warm soaks, Continue Physical Therapy    Restrictions: Regular Duty     Return Appointment: 11/16/2022 at 2:30 PM

## 2022-10-31 ENCOUNTER — CLINICAL SUPPORT (OUTPATIENT)
Dept: REHABILITATION | Facility: HOSPITAL | Age: 57
End: 2022-10-31
Payer: OTHER MISCELLANEOUS

## 2022-10-31 DIAGNOSIS — M53.86 DECREASED ROM OF LUMBAR SPINE: Primary | ICD-10-CM

## 2022-10-31 PROCEDURE — 97110 THERAPEUTIC EXERCISES: CPT | Mod: CQ

## 2022-10-31 NOTE — PROGRESS NOTES
[facility]   Physical Therapy Treatment Note     Name: Nina Mock Lambiliana  [facility] Number: [id number]    Therapy Diagnosis:   Encounter Diagnosis   Name Primary?    Decreased ROM of lumbar spine Yes     Physician: Boston Cuba PA-C    Visit Date: 10/31/2022  Physician: Boston Cuba PA-C     Physician Orders: PT Eval and Treat   Medical Diagnosis from Referral:   S39.012A (ICD-10-CM) - Acute myofascial strain of lumbar region, initial encounter   Y99.0 (ICD-10-CM) - Work related injury     Evaluation Date: 10/20/2022  Authorization Period Expiration: 10/12/2023  Plan of Care Expiration: 12/31/022  Progress Note Due: 11/20/2022  Visit # / Visits authorized: 1/ 12       Precautions: Standard      PTA Visit #: 1/5     Time In: 1:45 pm  Time Out: 2:30  Total Billable Time: 45 minutes    SUBJECTIVE     Pt reports: Not having pain just stiffness, she took an Ibuprofen prior to therapy.  She  not issued at time of evaluation  compliant with home exercise program.  Response to previous treatment: no adverse affect  Functional change: in progress    Pain: 2/10  Location: left back      OBJECTIVE     Objective Measures updated at progress report unless specified.     Treatment     Nina received the treatments listed below:      therapeutic exercises to develop strength, endurance, ROM, flexibility, posture, and core stabilization for 45 minutes including:  Nu Step x 5 min  B HS stretch strap 3x30s  B piriformis 3x30s  LTR x 15 5s  DKTC SB x 15 5s  SL Clams 2x10  SL hip abd 2x10  PPT x 20  TrA activation x 20      hot pack for 30 minutes to lumbar paraspinals.            Patient Education and Home Exercises     Home Exercises Provided and Patient Education Provided     Education provided:   - HEP    Written Home Exercises Provided: yes. Exercises were reviewed and Nina was able to demonstrate them prior to the end of the session.  Nina demonstrated good  understanding of the education  provided. See EMR under Patient Instructions for exercises provided during therapy sessions    ASSESSMENT     Patient tolerated therapy session without c/o pain. Progressed patient with additional core strengthening with MHP applied to lumbar area while patient performed therapeutic exercises. Patient reported that she felt much loser after session.    Nina Is progressing well towards her goals.   Pt prognosis is Good.     Pt will continue to benefit from skilled outpatient physical therapy to address the deficits listed in the problem list box on initial evaluation, provide pt/family education and to maximize pt's level of independence in the home and community environment.     Pt's spiritual, cultural and educational needs considered and pt agreeable to plan of care and goals.     Anticipated barriers to physical therapy: self-limiting behaviors due to pain    Goals: Short Term Goals (3 Weeks):  1. Pt will be compliant with HEP to supplement PT in decreasing pain with functional mobility.  2. Pt will perform pallof press with good control to demonstrate improved core strength.  3. Pt will improve lumbar ROM by 10 degrees in all abnormal planes to improve functional mobility.  4. Pt will improve impaired LE MMTs by 1/2 score to improve strength for functional tasks.  Long Term Goals (6 Weeks):   1. Pt will improve FOTO score to </= 45% limited to decrease perceived limitation with maintaining/changing body position.   2. Pt will perform box lift to overhead with 20lbs with good control to demonstrate improved core strength.  3. Pt will improve impaired LE MMTs by 1 score to improve strength for functional tasks.  4. Pt will report no pain during lumbar ROM to promote functional mobility.     PLAN     Plan of care Certification: 10/20/2022 to 12/31/2022.     Outpatient Physical Therapy 2 times weekly for 6 weeks to include the following interventions: Cervical/Lumbar Traction, Manual Therapy, Moist Heat/ Ice,  Neuromuscular Re-ed, Patient Education, Self Care, Therapeutic Activities, Therapeutic Exercise, and FDN.      Upon discharge from further skilled PT intervention it will determined if the need for a work conditioning program or Functional Capacity Evaluation is required to allow the injured worker to return to work with full potential achieved, continued improvement with body mechanics with advanced functional activities, and further prevention of future work-related injuries.        Boston Guerrier, PTA

## 2022-11-01 ENCOUNTER — TELEPHONE (OUTPATIENT)
Dept: GASTROENTEROLOGY | Facility: CLINIC | Age: 57
End: 2022-11-01
Payer: COMMERCIAL

## 2022-11-01 ENCOUNTER — TELEPHONE (OUTPATIENT)
Dept: ENDOSCOPY | Facility: HOSPITAL | Age: 57
End: 2022-11-01
Payer: COMMERCIAL

## 2022-11-01 NOTE — TELEPHONE ENCOUNTER
Instructed pt to contact [phone] and sent pt message to endo nurse  Cande Cotton. Pt verbalize understand and thank me.  ----- Message from Yumiko Hendrix sent at 11/1/2022  2:26 PM CDT -----  Regarding: Colonoscopy  Contact: pt @ [phone]  Pt is calling to get instructions for colonoscopy. Asking for a call back

## 2022-11-02 ENCOUNTER — TELEPHONE (OUTPATIENT)
Dept: ENDOSCOPY | Facility: HOSPITAL | Age: 57
End: 2022-11-02
Payer: COMMERCIAL

## 2022-11-03 ENCOUNTER — HOSPITAL ENCOUNTER (OUTPATIENT)
Facility: HOSPITAL | Age: 57
Discharge: HOME | End: 2022-11-03
Attending: INTERNAL MEDICINE | Admitting: INTERNAL MEDICINE
Payer: COMMERCIAL

## 2022-11-03 ENCOUNTER — ANESTHESIA EVENT (OUTPATIENT)
Dept: ENDOSCOPY | Facility: HOSPITAL | Age: 57
End: 2022-11-03
Payer: COMMERCIAL

## 2022-11-03 ENCOUNTER — ANESTHESIA (OUTPATIENT)
Dept: ENDOSCOPY | Facility: HOSPITAL | Age: 57
End: 2022-11-03
Payer: COMMERCIAL

## 2022-11-03 VITALS
SYSTOLIC BLOOD PRESSURE: 121 MMHG | DIASTOLIC BLOOD PRESSURE: 79 MMHG | OXYGEN SATURATION: 98 % | TEMPERATURE: 98 F | HEIGHT: 66 IN | RESPIRATION RATE: 14 BRPM | BODY MASS INDEX: 32.14 KG/M2 | WEIGHT: 200 LBS | HEART RATE: 69 BPM

## 2022-11-03 DIAGNOSIS — Z12.11 COLON CANCER SCREENING: ICD-10-CM

## 2022-11-03 DIAGNOSIS — K21.9 GASTROESOPHAGEAL REFLUX DISEASE, UNSPECIFIED WHETHER ESOPHAGITIS PRESENT: Primary | ICD-10-CM

## 2022-11-03 PROCEDURE — 45380 PR COLONOSCOPY,BIOPSY: ICD-10-PCS | Mod: ,,, | Performed by: INTERNAL MEDICINE

## 2022-11-03 PROCEDURE — 37000008 HC ANESTHESIA 1ST 15 MINUTES: Performed by: INTERNAL MEDICINE

## 2022-11-03 PROCEDURE — 43239 PR EGD, FLEX, W/BIOPSY, SGL/MULTI: ICD-10-PCS | Mod: 51,,, | Performed by: INTERNAL MEDICINE

## 2022-11-03 PROCEDURE — 63600175 PHARM REV CODE 636 W HCPCS: Performed by: NURSE ANESTHETIST, CERTIFIED REGISTERED

## 2022-11-03 PROCEDURE — 37000009 HC ANESTHESIA EA ADD 15 MINS: Performed by: INTERNAL MEDICINE

## 2022-11-03 PROCEDURE — 43239 EGD BIOPSY SINGLE/MULTIPLE: CPT | Performed by: INTERNAL MEDICINE

## 2022-11-03 PROCEDURE — 88305 TISSUE EXAM BY PATHOLOGIST: CPT | Mod: 26,,, | Performed by: STUDENT IN AN ORGANIZED HEALTH CARE EDUCATION/TRAINING PROGRAM

## 2022-11-03 PROCEDURE — E9220 PRA ENDO ANESTHESIA: ICD-10-PCS | Mod: 33,,, | Performed by: NURSE ANESTHETIST, CERTIFIED REGISTERED

## 2022-11-03 PROCEDURE — 45380 COLONOSCOPY AND BIOPSY: CPT | Mod: ,,, | Performed by: INTERNAL MEDICINE

## 2022-11-03 PROCEDURE — E9220 PRA ENDO ANESTHESIA: HCPCS | Mod: 33,,, | Performed by: NURSE ANESTHETIST, CERTIFIED REGISTERED

## 2022-11-03 PROCEDURE — 88305 TISSUE EXAM BY PATHOLOGIST: CPT | Mod: 59 | Performed by: STUDENT IN AN ORGANIZED HEALTH CARE EDUCATION/TRAINING PROGRAM

## 2022-11-03 PROCEDURE — 43239 EGD BIOPSY SINGLE/MULTIPLE: CPT | Mod: 51,,, | Performed by: INTERNAL MEDICINE

## 2022-11-03 PROCEDURE — 25000003 PHARM REV CODE 250: Performed by: NURSE ANESTHETIST, CERTIFIED REGISTERED

## 2022-11-03 PROCEDURE — 27201012 HC FORCEPS, HOT/COLD, DISP: Performed by: INTERNAL MEDICINE

## 2022-11-03 PROCEDURE — 45380 COLONOSCOPY AND BIOPSY: CPT | Mod: PT | Performed by: INTERNAL MEDICINE

## 2022-11-03 PROCEDURE — 88305 TISSUE EXAM BY PATHOLOGIST: ICD-10-PCS | Mod: 26,,, | Performed by: STUDENT IN AN ORGANIZED HEALTH CARE EDUCATION/TRAINING PROGRAM

## 2022-11-03 RX ORDER — PROPOFOL 10 MG/ML
VIAL (ML) INTRAVENOUS
Status: DISCONTINUED | OUTPATIENT
Start: 2022-11-03 | End: 2022-11-03

## 2022-11-03 RX ORDER — PROPOFOL 10 MG/ML
VIAL (ML) INTRAVENOUS CONTINUOUS PRN
Status: DISCONTINUED | OUTPATIENT
Start: 2022-11-03 | End: 2022-11-03

## 2022-11-03 RX ORDER — LIDOCAINE HYDROCHLORIDE 20 MG/ML
INJECTION, SOLUTION EPIDURAL; INFILTRATION; INTRACAUDAL; PERINEURAL
Status: DISCONTINUED | OUTPATIENT
Start: 2022-11-03 | End: 2022-11-03

## 2022-11-03 RX ORDER — SODIUM CHLORIDE 9 MG/ML
INJECTION, SOLUTION INTRAVENOUS CONTINUOUS
Status: DISCONTINUED | OUTPATIENT
Start: 2022-11-03 | End: 2022-11-03 | Stop reason: HOSPADM

## 2022-11-03 RX ADMIN — PROPOFOL 100 MG: 10 INJECTION, EMULSION INTRAVENOUS at 10:11

## 2022-11-03 RX ADMIN — SODIUM CHLORIDE: 9 INJECTION, SOLUTION INTRAVENOUS at 10:11

## 2022-11-03 RX ADMIN — LIDOCAINE HYDROCHLORIDE 50 MG: 20 INJECTION, SOLUTION EPIDURAL; INFILTRATION; INTRACAUDAL; PERINEURAL at 10:11

## 2022-11-03 RX ADMIN — Medication 200 MCG/KG/MIN: at 10:11

## 2022-11-03 NOTE — H&P
Short Stay Endoscopy History and Physical    PCP - Tomy Jackson,     Procedure - EGD/Colonoscopy  ASA - per anesthesia  Mallampati - per anesthesia  History of Anesthesia problems - no  Family history Anesthesia problems -  no   Plan of anesthesia - MAC    HPI:  This is a 57 y.o. female here for evaluation of :     EGD - abd pain  Colon - screening    ROS:  Constitutional: No fevers, chills, No weight loss  CV: No chest pain  Pulm: No cough, No shortness of breath  Ophtho: No vision changes  GI: see HPI  Derm: No rash    Medical History:  has a past medical history of Back problem, GERD (gastroesophageal reflux disease), and Obesity (BMI 30-39.9).    Surgical History:  has a past surgical history that includes Tonsillectomy.    Family History: family history includes Diabetes in her mother; Heart disease in her maternal grandfather; Hypertension in her mother; Kidney cancer in her brother; Lymphoma in her father.. Otherwise no colon cancer, inflammatory bowel disease, or GI malignancies.    Social History:  reports that she has quit smoking. She has never used smokeless tobacco. She reports current alcohol use. She reports that she does not use drugs.    Review of patient's allergies indicates:  No Known Allergies    Medications:   Medications Prior to Admission   Medication Sig Dispense Refill Last Dose    estradioL (ESTRACE) 0.01 % (0.1 mg/gram) vaginal cream Place 1 g vaginally twice a week. 42.5 g 3 11/2/2022    estradioL (ESTRACE) 1 MG tablet Take 1 tablet (1 mg total) by mouth once daily. 90 tablet 4 11/2/2022    gabapentin (NEURONTIN) 300 MG capsule Take by mouth.   11/2/2022    ibuprofen (ADVIL,MOTRIN) 600 MG tablet Take 1 tablet (600 mg total) by mouth every 8 (eight) hours as needed for Pain. 60 tablet 0 11/2/2022    LORazepam (ATIVAN) 0.5 MG tablet TAKE 1 TABLET BY MOUTH EVERY 12 HOURS AS NEEDED FOR ANXIETY 30 tablet 0 Past Week    meloxicam (MOBIC) 15 MG tablet Take 15 mg by mouth once daily.    11/2/2022    pantoprazole (PROTONIX) 40 MG tablet Take 1 tablet (40 mg total) by mouth once daily. 90 tablet 0 11/2/2022    progesterone (PROMETRIUM) 100 MG capsule TAKE 1 CAPSULE BY MOUTH EVERY DAY IN THE EVENING 90 capsule 4 Past Week    tiZANidine (ZANAFLEX) 4 MG tablet Take 1 tablet (4 mg total) by mouth 2 (two) times daily as needed (muscle spasms). Take off duty only. May cause drowsiness. 20 tablet 0 11/2/2022    GAVILYTE-G 236-22.74-6.74 -5.86 gram suspension Take 4,000 mLs by mouth.       LIDOcaine (LIDODERM) 5 % Place 1 patch onto the skin once daily. Remove & Discard patch within 12 hours or as directed by MD 30 patch 0 More than a month       Physical Exam:    Vital Signs:   Vitals:    11/03/22 0943   BP: (!) 127/59   Pulse: 75   Resp: 14   Temp: 98.2 °F (36.8 °C)       General Appearance: Well appearing in no acute distress  Eyes:    No scleral icterus  ENT: Neck supple, Lips, mucosa, and tongue normal; teeth and gums normal  Abdomen: Soft, non tender, non distended with normal bowel sounds. No hepatosplenomegaly, ascites, or mass.  Extremities: No edema  Skin: No rash    Labs:  Lab Results   Component Value Date    WBC 5.57 06/15/2021    HGB 12.6 06/15/2021    HCT 37.8 06/15/2021     06/15/2021    CHOL 188 06/15/2021    TRIG 51 06/15/2021    HDL 67 06/15/2021    ALT 20 06/15/2021    AST 22 06/15/2021     06/15/2021    K 4.4 06/15/2021     06/15/2021    CREATININE 0.8 06/15/2021    BUN 18 06/15/2021    CO2 26 06/15/2021    TSH 1.165 06/15/2021    HGBA1C 5.4 06/15/2021       I have explained the risks and benefits of endoscopy procedures to the patient including but not limited to bleeding, perforation, infection, and death.  The patient was asked if they understand and allowed to ask any further questions to their satisfaction.    Mihir Roberson MD

## 2022-11-03 NOTE — PROVATION PATIENT INSTRUCTIONS
Discharge Summary/Instructions after an Endoscopic Procedure  Patient Name: Nina Cuba  Patient MRN: [id number]  Patient YOB: 1965  Thursday, November 3, 2022  Mihir Roberson MD  Dear patient,  As a result of recent federal legislation (The Federal Cures Act), you may   receive lab or pathology results from your procedure in your [facility]   account before your physician is able to contact you. Your physician or   their representative will relay the results to you with their   recommendations at their soonest availability.  Thank you,  RESTRICTIONS:  During your procedure today, you received medications for sedation.  These   medications may affect your judgment, balance and coordination.  Therefore,   for 24 hours, you have the following restrictions:   - DO NOT drive a car, operate machinery, make legal/financial decisions,   sign important papers or drink alcohol.    ACTIVITY:  Today: no heavy lifting, straining or running due to procedural   sedation/anesthesia.  The following day: return to full activity including work.  DIET:  Eat and drink normally unless instructed otherwise.     TREATMENT FOR COMMON SIDE EFFECTS:  - Mild abdominal pain, nausea, belching, bloating or excessive gas:  rest,   eat lightly and use a heating pad.  - Sore Throat: treat with throat lozenges and/or gargle with warm salt   water.  - Because air was used during the procedure, expelling large amounts of air   from your rectum or belching is normal.  - If a bowel prep was taken, you may not have a bowel movement for 1-3 days.    This is normal.  SYMPTOMS TO WATCH FOR AND REPORT TO YOUR PHYSICIAN:  1. Abdominal pain or bloating, other than gas cramps.  2. Chest pain.  3. Back pain.  4. Signs of infection such as: chills or fever occurring within 24 hours   after the procedure.  5. Rectal bleeding, which would show as bright red, maroon, or black stools.   (A tablespoon of blood from the rectum is not serious, especially if    hemorrhoids are present.)  6. Vomiting.  7. Weakness or dizziness.  GO DIRECTLY TO THE NEAREST EMERGENCY ROOM IF YOU HAVE ANY OF THE FOLLOWING:      Difficulty breathing              Chills and/or fever over 101 F   Persistent vomiting and/or vomiting blood   Severe abdominal pain   Severe chest pain   Black, tarry stools   Bleeding- more than one tablespoon   Any other symptom or condition that you feel may need urgent attention  Your doctor recommends these additional instructions:  If any biopsies were taken, your doctors clinic will contact you in 1 to 2   weeks with any results.  - Patient has a contact number available for emergencies.  The signs and   symptoms of potential delayed complications were discussed with the   patient.  Return to normal activities tomorrow.  Written discharge   instructions were provided to the patient.   - Discharge patient to home.   - Await pathology results.   - Repeat colonoscopy in 7 years for surveillance based on pathology results.   10 years if hyperplastic polyp.  - The findings and recommendations were discussed with the designated   responsible adult.  For questions, problems or results please call your physician - Mihir Roberson MD at Work:  [phone].  [facility], EMERGENCY ROOM PHONE NUMBER: [phone]  IF A COMPLICATION OR EMERGENCY SITUATION ARISES AND YOU ARE UNABLE TO REACH   YOUR PHYSICIAN - GO DIRECTLY TO THE EMERGENCY ROOM.  Mihir Roberson MD  11/3/2022 10:44:34 AM  This report has been verified and signed electronically.  Dear patient,  As a result of recent federal legislation (The Federal Cures Act), you may   receive lab or pathology results from your procedure in your [facility]   account before your physician is able to contact you. Your physician or   their representative will relay the results to you with their   recommendations at their soonest availability.  Thank you,  PROVATION

## 2022-11-03 NOTE — PROVATION PATIENT INSTRUCTIONS
Discharge Summary/Instructions after an Endoscopic Procedure  Patient Name: Nina Cuba  Patient MRN: [id number]  Patient YOB: 1965  Thursday, November 3, 2022  Mihir Roberson MD  Dear patient,  As a result of recent federal legislation (The Federal Cures Act), you may   receive lab or pathology results from your procedure in your [facility]   account before your physician is able to contact you. Your physician or   their representative will relay the results to you with their   recommendations at their soonest availability.  Thank you,  RESTRICTIONS:  During your procedure today, you received medications for sedation.  These   medications may affect your judgment, balance and coordination.  Therefore,   for 24 hours, you have the following restrictions:   - DO NOT drive a car, operate machinery, make legal/financial decisions,   sign important papers or drink alcohol.    ACTIVITY:  Today: no heavy lifting, straining or running due to procedural   sedation/anesthesia.  The following day: return to full activity including work.  DIET:  Eat and drink normally unless instructed otherwise.     TREATMENT FOR COMMON SIDE EFFECTS:  - Mild abdominal pain, nausea, belching, bloating or excessive gas:  rest,   eat lightly and use a heating pad.  - Sore Throat: treat with throat lozenges and/or gargle with warm salt   water.  - Because air was used during the procedure, expelling large amounts of air   from your rectum or belching is normal.  - If a bowel prep was taken, you may not have a bowel movement for 1-3 days.    This is normal.  SYMPTOMS TO WATCH FOR AND REPORT TO YOUR PHYSICIAN:  1. Abdominal pain or bloating, other than gas cramps.  2. Chest pain.  3. Back pain.  4. Signs of infection such as: chills or fever occurring within 24 hours   after the procedure.  5. Rectal bleeding, which would show as bright red, maroon, or black stools.   (A tablespoon of blood from the rectum is not serious, especially if    hemorrhoids are present.)  6. Vomiting.  7. Weakness or dizziness.  GO DIRECTLY TO THE NEAREST EMERGENCY ROOM IF YOU HAVE ANY OF THE FOLLOWING:      Difficulty breathing              Chills and/or fever over 101 F   Persistent vomiting and/or vomiting blood   Severe abdominal pain   Severe chest pain   Black, tarry stools   Bleeding- more than one tablespoon   Any other symptom or condition that you feel may need urgent attention  Your doctor recommends these additional instructions:  If any biopsies were taken, your doctors clinic will contact you in 1 to 2   weeks with any results.  - Discharge patient to home.   - Await pathology results.   - Perform a colonoscopy today.   - The findings and recommendations were discussed with the designated   responsible adult.  For questions, problems or results please call your physician - Mihir Roberson MD at Work:  [phone].  [facility], EMERGENCY ROOM PHONE NUMBER: [phone]  IF A COMPLICATION OR EMERGENCY SITUATION ARISES AND YOU ARE UNABLE TO REACH   YOUR PHYSICIAN - GO DIRECTLY TO THE EMERGENCY ROOM.  Mihir Roberson MD  11/3/2022 10:29:07 AM  This report has been verified and signed electronically.  Dear patient,  As a result of recent federal legislation (The Federal Cures Act), you may   receive lab or pathology results from your procedure in your [facility]   account before your physician is able to contact you. Your physician or   their representative will relay the results to you with their   recommendations at their soonest availability.  Thank you,  PROVATION

## 2022-11-03 NOTE — TRANSFER OF CARE
"Anesthesia Transfer of Care Note    Patient: Nina Cuba    Procedure(s) Performed: Procedure(s) (LRB):  EGD (ESOPHAGOGASTRODUODENOSCOPY) (N/A)  COLONOSCOPY (N/A)    Patient location: PACU    Anesthesia Type: general    Transport from OR: Transported from OR on room air with adequate spontaneous ventilation    Post pain: adequate analgesia    Post assessment: no apparent anesthetic complications    Post vital signs: stable    Level of consciousness: awake    Nausea/Vomiting: no nausea/vomiting    Complications: none    Transfer of care protocol was followed      Last vitals:   Visit Vitals  BP 98/63 (BP Location: Left arm, Patient Position: Lying)   Pulse 75   Temp 36.8 °C (98.2 °F) (Temporal)   Resp 14   Ht 5' 6" (1.676 m)   Wt 90.7 kg (200 lb)   LMP 08/09/2018   SpO2 99%   Breastfeeding No   BMI 32.28 kg/m²     "

## 2022-11-03 NOTE — ANESTHESIA POSTPROCEDURE EVALUATION
Anesthesia Post Evaluation    Patient: Nina Cuba    Procedure(s) Performed: Procedure(s) (LRB):  EGD (ESOPHAGOGASTRODUODENOSCOPY) (N/A)  COLONOSCOPY (N/A)    Final Anesthesia Type: general      Patient location during evaluation: PACU  Patient participation: Yes- Able to Participate  Level of consciousness: awake and alert  Post-procedure vital signs: reviewed and stable  Pain management: adequate  Airway patency: patent    PONV status at discharge: No PONV  Anesthetic complications: no      Cardiovascular status: blood pressure returned to baseline  Respiratory status: unassisted  Hydration status: euvolemic  Follow-up not needed.          Vitals Value Taken Time   /79 11/03/22 1116   Temp 36.5 °C (97.7 °F) 11/03/22 1046   Pulse 69 11/03/22 1116   Resp 14 11/03/22 1116   SpO2 98 % 11/03/22 1116         Event Time   Out of Recovery 11:23:00         Pain/Kt Score: Kt Score: 9 (11/3/2022 10:46 AM)

## 2022-11-03 NOTE — ANESTHESIA PREPROCEDURE EVALUATION
11/03/2022  Nina Cuba is a 57 y.o., female.      Pre-op Assessment    I have reviewed the Patient Summary Reports.       I have reviewed the Medications.     Review of Systems  Anesthesia Hx:  No problems with previous Anesthesia  History of prior surgery of interest to airway management or planning:   Social:  Former Smoker, Social Alcohol Use    Hematology/Oncology:  Hematology Normal   Oncology Normal     EENT/Dental:EENT/Dental Normal   Cardiovascular:  Cardiovascular Normal Exercise tolerance: good     Pulmonary:  Pulmonary Normal    Renal/:  Renal/ Normal     Hepatic/GI:   GERD, well controlled    Musculoskeletal:  Musculoskeletal Normal    Neurological:  Neurology Normal    Endocrine:  Endocrine Normal    Dermatological:  Skin Normal        Physical Exam  General: Well nourished, Cooperative, Alert and Oriented    Airway:  Mallampati: II   Mouth Opening: Normal  TM Distance: Normal  Tongue: Normal  Neck ROM: Normal ROM    Dental:  Intact        Anesthesia Plan  Type of Anesthesia, risks & benefits discussed:    Anesthesia Type: Gen Natural Airway  Intra-op Monitoring Plan: Standard ASA Monitors  Post Op Pain Control Plan: multimodal analgesia and IV/PO Opioids PRN  Induction:  IV  Informed Consent: Informed consent signed with the Patient and all parties understand the risks and agree with anesthesia plan.  All questions answered.   ASA Score: 3    Ready For Surgery From Anesthesia Perspective.     .

## 2022-11-04 ENCOUNTER — CLINICAL SUPPORT (OUTPATIENT)
Dept: REHABILITATION | Facility: HOSPITAL | Age: 57
End: 2022-11-04
Payer: OTHER MISCELLANEOUS

## 2022-11-04 DIAGNOSIS — M53.86 DECREASED ROM OF LUMBAR SPINE: Primary | ICD-10-CM

## 2022-11-04 PROCEDURE — 97110 THERAPEUTIC EXERCISES: CPT | Mod: CQ

## 2022-11-04 NOTE — PROGRESS NOTES
[facility]   Physical Therapy Treatment Note     Name: Nina Mock Lambiliana  Clinic Number: [id number]    Therapy Diagnosis:   No diagnosis found.    Physician: Boston Cuba PA-C    Visit Date: 11/4/2022  Physician: Boston Cuba PA-C     Physician Orders: PT Eval and Treat   Medical Diagnosis from Referral:   S39.012A (ICD-10-CM) - Acute myofascial strain of lumbar region, initial encounter   Y99.0 (ICD-10-CM) - Work related injury     Evaluation Date: 10/20/2022  Authorization Period Expiration: 10/12/2023  Plan of Care Expiration: 12/31/022  Progress Note Due: 11/20/2022  Visit # / Visits authorized: 3/ 12       Precautions: Standard      PTA Visit #: 2/5     Time In: 2:40 pm  Time Out: 3:20 pm  Total Billable Time: 40 minutes    SUBJECTIVE     Pt reports: She is a little groggy from a colonoscopy and endoscopy yesterday, she would like to take it easy today. No back pain just tightness.  She  not issued at time of evaluation  compliant with home exercise program.  Response to previous treatment: no adverse affect  Functional change: in progress    Pain: 0/10  Location: left back      OBJECTIVE     Objective Measures updated at progress report unless specified.     Treatment     Nina received the treatments listed below:      therapeutic exercises to develop strength, endurance, ROM, flexibility, posture, and core stabilization for 38 minutes including:  Nu Step x 5 min NP  B HS stretch strap 3x30s  B piriformis 3x30s  LTR x 15 5s  DKTC SB x 15 5s  SL Clams 3x10 (R)  SL hip abd 3x10 (R)  PPT x 20  TrA activation x 20  Bridges w/ march x 10  Supine clam GTB B 2x10  hot pack for 30 minutes to lumbar paraspinals.            Patient Education and Home Exercises     Home Exercises Provided and Patient Education Provided     Education provided:   - HEP    Written Home Exercises Provided: yes. Exercises were reviewed and Nina was able to demonstrate them prior to the end of the session.   Nina demonstrated good  understanding of the education provided. See EMR under Patient Instructions for exercises provided during therapy sessions    ASSESSMENT     Patient tolerated therapy session without c/o pain. Progressed patient with additional core strengthening and increased reps with glute medius exercises. Pt not able to tolerate (L) side lying clams or hip abd due to increased L LBP therefore held and replaced with supine clams with green TB.    Nina Is progressing well towards her goals.   Pt prognosis is Good.     Pt will continue to benefit from skilled outpatient physical therapy to address the deficits listed in the problem list box on initial evaluation, provide pt/family education and to maximize pt's level of independence in the home and community environment.     Pt's spiritual, cultural and educational needs considered and pt agreeable to plan of care and goals.     Anticipated barriers to physical therapy: self-limiting behaviors due to pain    Goals: Short Term Goals (3 Weeks):  1. Pt will be compliant with HEP to supplement PT in decreasing pain with functional mobility.  2. Pt will perform pallof press with good control to demonstrate improved core strength.  3. Pt will improve lumbar ROM by 10 degrees in all abnormal planes to improve functional mobility.  4. Pt will improve impaired LE MMTs by 1/2 score to improve strength for functional tasks.  Long Term Goals (6 Weeks):   1. Pt will improve FOTO score to </= 45% limited to decrease perceived limitation with maintaining/changing body position.   2. Pt will perform box lift to overhead with 20lbs with good control to demonstrate improved core strength.  3. Pt will improve impaired LE MMTs by 1 score to improve strength for functional tasks.  4. Pt will report no pain during lumbar ROM to promote functional mobility.     PLAN     Plan of care Certification: 10/20/2022 to 12/31/2022.     Outpatient Physical Therapy 2 times weekly for 6  weeks to include the following interventions: Cervical/Lumbar Traction, Manual Therapy, Moist Heat/ Ice, Neuromuscular Re-ed, Patient Education, Self Care, Therapeutic Activities, Therapeutic Exercise, and FDN.      Upon discharge from further skilled PT intervention it will determined if the need for a work conditioning program or Functional Capacity Evaluation is required to allow the injured worker to return to work with full potential achieved, continued improvement with body mechanics with advanced functional activities, and further prevention of future work-related injuries.        Boston Guerrier, PTA

## 2022-11-07 ENCOUNTER — CLINICAL SUPPORT (OUTPATIENT)
Dept: REHABILITATION | Facility: HOSPITAL | Age: 57
End: 2022-11-07
Payer: OTHER MISCELLANEOUS

## 2022-11-07 DIAGNOSIS — M53.86 DECREASED ROM OF LUMBAR SPINE: Primary | ICD-10-CM

## 2022-11-07 PROCEDURE — 97110 THERAPEUTIC EXERCISES: CPT

## 2022-11-07 NOTE — PROGRESS NOTES
[facility]   Physical Therapy Treatment Note     Name: Nina Cuba  [facility] Number: [id number]    Therapy Diagnosis:   Encounter Diagnosis   Name Primary?    Decreased ROM of lumbar spine Yes       Physician: Boston Cuba PA-C    Visit Date: 11/7/2022  Physician: Boston Cuba PA-C     Physician Orders: PT Eval and Treat   Medical Diagnosis from Referral:   S39.012A (ICD-10-CM) - Acute myofascial strain of lumbar region, initial encounter   Y99.0 (ICD-10-CM) - Work related injury     Evaluation Date: 10/20/2022  Authorization Period Expiration: 10/12/2023  Plan of Care Expiration: 12/31/022  Progress Note Due: 11/20/2022  Visit # / Visits authorized: 4/12       Precautions: Standard      PTA Visit #: 2/5     Time In: 145  Time Out: 230  Total Billable Time: 45 minutes    SUBJECTIVE     Pt reports: she is feeling better compared to starting physical therapy. Patient reports she can bend better with less pain         She was compliant with home exercise program.  Response to previous treatment: no adverse affect  Functional change: in progress    Pain: 0/10  Location: left back      OBJECTIVE     Lumbar range of motion Screen:   - ROM within functional limits   - Minimal pain with L side bending     Treatment     Nina received the treatments listed below:      therapeutic exercises to develop strength, endurance, ROM, flexibility, posture, and core stabilization for 45  minutes including:    Supine exercises on Lumbar heat       B Piriformis 3x30s  LTR x 15 5s  DKTC SB x 15 5s  Open books 15x ea side   Pallof Press   - 7lbs   - x 20   Pallof Press + Rotation  - 3lbs   - x20       NOT PERFORMED   SL Clams 3x10 (R)  SL hip abd 3x10 (R)  PPT x 20  TrA activation x 20  Bridges w/ march x 10  Supine clam GTB B 2x10            Patient Education and Home Exercises     Home Exercises Provided and Patient Education Provided     Education provided:   - HEP    Written Home Exercises  Provided: yes. Exercises were reviewed and Nina was able to demonstrate them prior to the end of the session.  Nina demonstrated good  understanding of the education provided. See EMR under Patient Instructions for exercises provided during therapy sessions    ASSESSMENT     Patient tolerated therapy session without c/o pain. Progressed patient with additional core strengthening. Patient was reassessed briefly and she has improved her lumbar range of motion significantly since starting therapy. Patient has had no worsening symptoms after todays session.          Nina Is progressing well towards her goals.   Pt prognosis is Good.     Pt will continue to benefit from skilled outpatient physical therapy to address the deficits listed in the problem list box on initial evaluation, provide pt/family education and to maximize pt's level of independence in the home and community environment.     Pt's spiritual, cultural and educational needs considered and pt agreeable to plan of care and goals.     Anticipated barriers to physical therapy: self-limiting behaviors due to pain    Goals: Short Term Goals (3 Weeks):  1. Pt will be compliant with HEP to supplement PT in decreasing pain with functional mobility.  2. Pt will perform pallof press with good control to demonstrate improved core strength.  3. Pt will improve lumbar ROM by 10 degrees in all abnormal planes to improve functional mobility.  4. Pt will improve impaired LE MMTs by 1/2 score to improve strength for functional tasks.  Long Term Goals (6 Weeks):   1. Pt will improve FOTO score to </= 45% limited to decrease perceived limitation with maintaining/changing body position.   2. Pt will perform box lift to overhead with 20lbs with good control to demonstrate improved core strength.  3. Pt will improve impaired LE MMTs by 1 score to improve strength for functional tasks.  4. Pt will report no pain during lumbar ROM to promote functional mobility.     PLAN     Plan  of care Certification: 10/20/2022 to 12/31/2022.     Outpatient Physical Therapy 2 times weekly for 6 weeks to include the following interventions: Cervical/Lumbar Traction, Manual Therapy, Moist Heat/ Ice, Neuromuscular Re-ed, Patient Education, Self Care, Therapeutic Activities, Therapeutic Exercise, and FDN.      Upon discharge from further skilled PT intervention it will determined if the need for a work conditioning program or Functional Capacity Evaluation is required to allow the injured worker to return to work with full potential achieved, continued improvement with body mechanics with advanced functional activities, and further prevention of future work-related injuries.        Tin Irene, PT

## 2022-11-09 LAB
FINAL PATHOLOGIC DIAGNOSIS: NORMAL
GROSS: NORMAL
Lab: NORMAL

## 2022-11-11 ENCOUNTER — OFFICE VISIT (OUTPATIENT)
Dept: INTERNAL MEDICINE | Facility: CLINIC | Age: 57
End: 2022-11-11
Payer: COMMERCIAL

## 2022-11-11 VITALS
BODY MASS INDEX: 31.6 KG/M2 | SYSTOLIC BLOOD PRESSURE: 110 MMHG | OXYGEN SATURATION: 98 % | TEMPERATURE: 98 F | HEIGHT: 67 IN | DIASTOLIC BLOOD PRESSURE: 70 MMHG | HEART RATE: 55 BPM | WEIGHT: 201.31 LBS

## 2022-11-11 DIAGNOSIS — Z00.00 ANNUAL PHYSICAL EXAM: Primary | ICD-10-CM

## 2022-11-11 DIAGNOSIS — K21.9 GASTROESOPHAGEAL REFLUX DISEASE, UNSPECIFIED WHETHER ESOPHAGITIS PRESENT: ICD-10-CM

## 2022-11-11 DIAGNOSIS — K44.9 HIATAL HERNIA: ICD-10-CM

## 2022-11-11 DIAGNOSIS — E66.9 OBESITY (BMI 30-39.9): ICD-10-CM

## 2022-11-11 PROCEDURE — 99999 PR PBB SHADOW E&M-EST. PATIENT-LVL IV: CPT | Mod: PBBFAC,,, | Performed by: INTERNAL MEDICINE

## 2022-11-11 PROCEDURE — 99396 PR PREVENTIVE VISIT,EST,40-64: ICD-10-PCS | Mod: S$GLB,,, | Performed by: INTERNAL MEDICINE

## 2022-11-11 PROCEDURE — 99396 PREV VISIT EST AGE 40-64: CPT | Mod: S$GLB,,, | Performed by: INTERNAL MEDICINE

## 2022-11-11 PROCEDURE — 99999 PR PBB SHADOW E&M-EST. PATIENT-LVL IV: ICD-10-PCS | Mod: PBBFAC,,, | Performed by: INTERNAL MEDICINE

## 2022-11-11 NOTE — PROGRESS NOTES
Subjective:       Patient ID: Nina Cuba is a 57 y.o. female.    Chief Complaint: Labs Only    HPI  57 y.o. Female here for annual exam.      Vaccines: Influenza (declined); Tetanus (2020)  Eye exam: 6/20  Mammogram: 2/22  Gyn exam: 2022  Colonoscopy: 11/22     Exercise: no  Diet: regular  LMP: menopausal     Past Medical History:  No date: Back problem  No date: GERD (gastroesophageal reflux disease)  No date: Obesity (BMI 30-39.9)  No date: Hiatal hernia  No date: Anxiety  Past Surgical History:  No date: TONSILLECTOMY  Social History    Socioeconomic History      Marital status: Single      Spouse name: Not on file      Number of children: 0      Years of education: Not on file      Highest education level: Not on file    Occupational History      Not on file    Social Needs      Financial resource strain: Not on file      Food insecurity:        Worry: Not on file        Inability: Not on file      Transportation needs:        Medical: Not on file        Non-medical: Not on file    Tobacco Use      Smoking status: Former Smoker      Smokeless tobacco: Never Used    Substance and Sexual Activity      Alcohol use: Yes        Comment: rare      Drug use: Never      Sexual activity: Yes        Partners: Male    Lifestyle      Physical activity:        Days per week: Not on file        Minutes per session: Not on file      Stress: Not on file    Relationships      Social connections:        Talks on phone: Not on file        Gets together: Not on file        Attends Alevism service: Not on file        Active member of club or organization: Not on file        Attends meetings of clubs or organizations: Not on file        Relationship status: Not on file    Other Topics      Concerns:        Not on file    Social History Narrative      Associate at [employer]      Review of patient's allergies indicates:  No Known Allergies  Review of Systems   Constitutional:  Negative for activity change, appetite  change, chills, diaphoresis, fatigue, fever and unexpected weight change.   HENT:  Negative for nasal congestion, mouth sores, postnasal drip, rhinorrhea, sinus pressure/congestion, sneezing, sore throat, trouble swallowing and voice change.    Eyes:  Negative for pain, discharge and visual disturbance.   Respiratory:  Negative for cough, shortness of breath and wheezing.    Cardiovascular:  Negative for chest pain, palpitations and leg swelling.   Gastrointestinal:  Negative for abdominal pain, blood in stool, constipation, diarrhea, nausea and vomiting.   Endocrine: Negative for cold intolerance and heat intolerance.   Genitourinary:  Negative for difficulty urinating, dysuria, frequency, hematuria and urgency.   Musculoskeletal:  Negative for arthralgias and myalgias.   Integumentary:  Negative for rash and wound.   Allergic/Immunologic: Negative for environmental allergies and food allergies.   Neurological:  Negative for dizziness, tremors, seizures, syncope, weakness, light-headedness and headaches.   Hematological:  Negative for adenopathy. Does not bruise/bleed easily.   Psychiatric/Behavioral:  Negative for confusion, self-injury, sleep disturbance and suicidal ideas. The patient is nervous/anxious.        Objective:      Physical Exam  Vitals and nursing note reviewed.   Constitutional:       General: She is not in acute distress.     Appearance: Normal appearance. She is well-developed. She is not diaphoretic.   HENT:      Head: Normocephalic and atraumatic.      Right Ear: External ear normal.      Left Ear: External ear normal.      Nose: Nose normal.      Mouth/Throat:      Pharynx: No oropharyngeal exudate.   Eyes:      General: No scleral icterus.        Right eye: No discharge.         Left eye: No discharge.      Conjunctiva/sclera: Conjunctivae normal.      Pupils: Pupils are equal, round, and reactive to light.   Neck:      Thyroid: No thyromegaly.      Vascular: No JVD.   Cardiovascular:       Rate and Rhythm: Normal rate and regular rhythm.      Pulses: Normal pulses.      Heart sounds: Normal heart sounds. No murmur heard.  Pulmonary:      Effort: Pulmonary effort is normal. No respiratory distress.      Breath sounds: Normal breath sounds. No wheezing, rhonchi or rales.   Chest:      Chest wall: No tenderness.   Abdominal:      General: Bowel sounds are normal. There is no distension.      Palpations: Abdomen is soft.      Tenderness: There is no abdominal tenderness. There is no guarding or rebound.   Musculoskeletal:      Cervical back: Neck supple.      Right lower leg: No edema.      Left lower leg: No edema.   Lymphadenopathy:      Cervical: No cervical adenopathy.   Skin:     General: Skin is warm and dry.      Coloration: Skin is not pale.      Findings: No rash.   Neurological:      General: No focal deficit present.      Mental Status: She is alert and oriented to person, place, and time.      Gait: Gait normal.   Psychiatric:         Behavior: Behavior normal.         Thought Content: Thought content normal.         Judgment: Judgment normal.       Assessment:       Problem List Items Addressed This Visit          Endocrine    Obesity (BMI 30-39.9)       GI    Hiatal hernia    Gastroesophageal reflux disease     Other Visit Diagnoses       Annual physical exam    -  Primary    Relevant Orders    CBC Auto Differential    Comprehensive Metabolic Panel    TSH    Lipid Panel    Urinalysis    Hemoglobin A1C            Plan:    Blood work ordered       Obesity- diet/exercise stressed       GERD/Hiatal hernia- stable on Protonix 40 mg daily     Anxiety- stable on Ativan PRN     F/u in 1 yr

## 2022-11-15 ENCOUNTER — PATIENT MESSAGE (OUTPATIENT)
Dept: GASTROENTEROLOGY | Facility: CLINIC | Age: 57
End: 2022-11-15
Payer: COMMERCIAL

## 2022-11-15 ENCOUNTER — TELEPHONE (OUTPATIENT)
Dept: URGENT CARE | Facility: CLINIC | Age: 57
End: 2022-11-15
Payer: COMMERCIAL

## 2022-11-15 NOTE — TELEPHONE ENCOUNTER
Patient called and states that she cancelled her appointment to day on her my chart and needs to reschedule It for 11/22/22. I went in and rescheduled her appointment. Patient states that she has transportation issue at the moment.

## 2022-11-22 ENCOUNTER — CLINICAL SUPPORT (OUTPATIENT)
Dept: REHABILITATION | Facility: HOSPITAL | Age: 57
End: 2022-11-22
Payer: OTHER MISCELLANEOUS

## 2022-11-22 ENCOUNTER — OFFICE VISIT (OUTPATIENT)
Dept: URGENT CARE | Facility: CLINIC | Age: 57
End: 2022-11-22
Payer: OTHER MISCELLANEOUS

## 2022-11-22 VITALS
DIASTOLIC BLOOD PRESSURE: 86 MMHG | BODY MASS INDEX: 31.39 KG/M2 | WEIGHT: 200 LBS | OXYGEN SATURATION: 98 % | RESPIRATION RATE: 18 BRPM | HEART RATE: 63 BPM | TEMPERATURE: 98 F | HEIGHT: 67 IN | SYSTOLIC BLOOD PRESSURE: 124 MMHG

## 2022-11-22 DIAGNOSIS — M54.9 DORSALGIA, UNSPECIFIED: ICD-10-CM

## 2022-11-22 DIAGNOSIS — S39.012A ACUTE MYOFASCIAL STRAIN OF LUMBAR REGION, INITIAL ENCOUNTER: ICD-10-CM

## 2022-11-22 DIAGNOSIS — M53.86 DECREASED ROM OF LUMBAR SPINE: Primary | ICD-10-CM

## 2022-11-22 DIAGNOSIS — S39.012D ACUTE MYOFASCIAL STRAIN OF LUMBAR REGION, SUBSEQUENT ENCOUNTER: Primary | ICD-10-CM

## 2022-11-22 PROCEDURE — 97140 MANUAL THERAPY 1/> REGIONS: CPT | Mod: CQ

## 2022-11-22 PROCEDURE — 99214 PR OFFICE/OUTPT VISIT, EST, LEVL IV, 30-39 MIN: ICD-10-PCS | Mod: S$GLB,,, | Performed by: NURSE PRACTITIONER

## 2022-11-22 PROCEDURE — 97110 THERAPEUTIC EXERCISES: CPT | Mod: CQ

## 2022-11-22 PROCEDURE — 99214 OFFICE O/P EST MOD 30 MIN: CPT | Mod: S$GLB,,, | Performed by: NURSE PRACTITIONER

## 2022-11-22 RX ORDER — IBUPROFEN 600 MG/1
600 TABLET ORAL EVERY 8 HOURS PRN
Qty: 30 TABLET | Refills: 0 | Status: SHIPPED | OUTPATIENT
Start: 2022-11-22 | End: 2022-12-20 | Stop reason: SDUPTHER

## 2022-11-22 NOTE — LETTER
[facility]  [address] [address]  Phone: [phone]  Fax: [phone]  [facility]: [id number]     Name: Nina Cuba  Injury Date: 08/22/2022   Employee ID: [id number] Date of Treatment: 11/22/2022   Company: [employer]      Appointment Time: 12:15 PM Arrived: 12:06 PM   Provider: Perla Sky NP Time Out: 1:34      Office Treatment:   1. Acute myofascial strain of lumbar region, subsequent encounter    2. Acute myofascial strain of lumbar region, initial encounter    3. Dorsalgia, unspecified      Medications Ordered This Encounter   Medications    ibuprofen (ADVIL,MOTRIN) 600 MG tablet      Patient Instructions: Attention not to aggravate affected area, Daily home exercises/warm soaks, Continue Physical Therapy, MRI to be scheduled once authorized (May alternate ice and heat.)      Restrictions: Sit or stand as needed, Avoid frequent bending/lifting/twisting, No lifting/pushing/pulling more than 10 lbs     Return Appointment: 12/6/2022 at 1:00 PM KAUSHIK

## 2022-11-22 NOTE — PROGRESS NOTES
[facility]   Physical Therapy Treatment Note     Name: Nina Cuba  Clinic Number: [id number]    Therapy Diagnosis:   No diagnosis found.      Physician: Boston Cuba PA-C    Visit Date: 11/22/2022  Physician: Boston Cuba PA-C     Physician Orders: PT Eval and Treat   Medical Diagnosis from Referral:   S39.012A (ICD-10-CM) - Acute myofascial strain of lumbar region, initial encounter   Y99.0 (ICD-10-CM) - Work related injury     Evaluation Date: 10/20/2022  Authorization Period Expiration: 10/12/2023  Plan of Care Expiration: 12/31/022  Progress Note Due: 11/20/2022  Visit # / Visits authorized: 5/12  FOTO: 5/5     Precautions: Standard      PTA Visit #: 2/5     Time In: 10:45 am  Time Out: 11:45 am  Total Billable Time: 45 minutes    SUBJECTIVE     Pt reports: a sharp pain in her low back with pins and needles down the L leg with upper back pain started last Friday and lasted over the weekend. She is scheduled to see the doctor today.  She did not attend therapy last week.      She was compliant with home exercise program.  Response to previous treatment: no adverse affect  Functional change: in progress    Pain: 9/10  Location: left back      OBJECTIVE     Positive (L) SLR     Treatment     Nina received the treatments listed below:      therapeutic exercises to develop strength, endurance, ROM, flexibility, posture, and core stabilization for 15 minutes including:    Supine exercises on Lumbar heat       B Piriformis 3x30s (held)  LTR x 15 5s (held)  DKTC SB x 15 5s (held)  Open books 15x ea side (held)  Pallof Press   - 7lbs   - x 20   Pallof Press + Rotation (L rotation produced symptoms - held)  - 3lbs   - x20       NOT PERFORMED   SL Clams 3x10 (R)  SL hip abd 3x10 (R)  PPT x 20  TrA activation x 20  Bridges w/ march x 10  Supine clam GTB B 2x10    MANUAL THERAPY TECHNIQUES including Joint mobilizations and Soft tissue Mobilization were applied to L lumbar, SIJ and  glute for 15 minutes.      cold pack for 15 minutes to lumbar region.      Patient Education and Home Exercises     Home Exercises Provided and Patient Education Provided     Education provided:   - HEP    Written Home Exercises Provided: yes. Exercises were reviewed and Nina was able to demonstrate them prior to the end of the session.  Nina demonstrated good  understanding of the education provided. See EMR under Patient Instructions for exercises provided during therapy sessions    ASSESSMENT     Patient presents with antalgic gait, (L) low back pain, which was severe over the weekend, but has progressively decreased since onset. Rotation exercises not tolerated today therefore held. LL hip distraction performed with good response. Positive SLR noted on (L).       Nina Is progressing well towards her goals.   Pt prognosis is Good.     Pt will continue to benefit from skilled outpatient physical therapy to address the deficits listed in the problem list box on initial evaluation, provide pt/family education and to maximize pt's level of independence in the home and community environment.     Pt's spiritual, cultural and educational needs considered and pt agreeable to plan of care and goals.     Anticipated barriers to physical therapy: self-limiting behaviors due to pain    Goals: Short Term Goals (3 Weeks):  1. Pt will be compliant with HEP to supplement PT in decreasing pain with functional mobility.  2. Pt will perform pallof press with good control to demonstrate improved core strength.  3. Pt will improve lumbar ROM by 10 degrees in all abnormal planes to improve functional mobility.  4. Pt will improve impaired LE MMTs by 1/2 score to improve strength for functional tasks.  Long Term Goals (6 Weeks):   1. Pt will improve FOTO score to </= 45% limited to decrease perceived limitation with maintaining/changing body position.   2. Pt will perform box lift to overhead with 20lbs with good control to demonstrate  improved core strength.  3. Pt will improve impaired LE MMTs by 1 score to improve strength for functional tasks.  4. Pt will report no pain during lumbar ROM to promote functional mobility.     PLAN     Plan of care Certification: 10/20/2022 to 12/31/2022.     Outpatient Physical Therapy 2 times weekly for 6 weeks to include the following interventions: Cervical/Lumbar Traction, Manual Therapy, Moist Heat/ Ice, Neuromuscular Re-ed, Patient Education, Self Care, Therapeutic Activities, Therapeutic Exercise, and FDN.      Upon discharge from further skilled PT intervention it will determined if the need for a work conditioning program or Functional Capacity Evaluation is required to allow the injured worker to return to work with full potential achieved, continued improvement with body mechanics with advanced functional activities, and further prevention of future work-related injuries.        Boston Guerrier, PTA

## 2022-11-22 NOTE — PROGRESS NOTES
Subjective:       Patient ID: Nina Cuba is a 57 y.o. female.    Chief Complaint: Back Pain    RV Back Pain (DOI 08-22-22) She works for [employer] as a . She is here today to follow up for a back injury. She states that she had therapy this morning and couldn't finish due to pain. She states that she has a sharp pain in her low back with pins and needles down the left leg. She states that upper back pain started last Friday and lasted over the weekend and now she has neck pain. By Sunday the pain was so bad that she couldn't walk. Her pain level is 9-10/10.     EC    She has been going to PT. Reports her symptoms have worsened. Had to go home from work after an hour on Sunday due to the pain, burning, numbness/tingling and weakness in R leg. She was afraid of falling. When she gets into the car has to manually  L leg. Now having pain shooting up her back and into neck as well. Ibuprofen is helping. MWT    Back Pain  Associated symptoms include headaches and numbness. Pertinent negatives include no abdominal pain, bladder incontinence or bowel incontinence.     Constitution: Positive for activity change and generalized weakness.   Neck: Positive for neck pain and neck stiffness.   Gastrointestinal:  Negative for abdominal pain, nausea, vomiting and bowel incontinence.   Genitourinary:  Negative for bladder incontinence.   Musculoskeletal:  Positive for pain, abnormal ROM of joint, back pain, muscle cramps and muscle ache.   Skin:  Negative for erythema.   Neurological:  Positive for loss of balance, headaches, numbness and tingling.   Psychiatric/Behavioral:  Positive for sleep disturbance.       Objective:      Physical Exam  Constitutional:       Appearance: Normal appearance.      Comments: Guarded posture.   HENT:      Right Ear: External ear normal.      Left Ear: External ear normal.   Eyes:      Conjunctiva/sclera: Conjunctivae normal.   Cardiovascular:      Pulses: Normal  pulses.   Pulmonary:      Effort: Pulmonary effort is normal.   Musculoskeletal:         General: Tenderness present. No swelling or deformity.      Lumbar back: Spasms and tenderness present. No swelling or deformity. Decreased range of motion. Positive left straight leg raise test. Negative right straight leg raise test.        Back:       Comments: Pain to L lower back with c/o burning, numbness, tingling to L leg mostly in thigh but goes all the way to toes. Positive L SLR. Neg R SLR. Severe pain with flexion 45 degrees. Discomfort with extension and bilateral bending/rotation. Weakness noted to LLE. Heel and toe walks well. Had difficulty getting off exam table. Was brought to tears.  Cervical pain with flexion of neck. Otherwise no pain to neck with ROM.   Skin:     General: Skin is warm and dry.      Findings: No bruising or erythema.   Neurological:      General: No focal deficit present.      Mental Status: She is alert and oriented to person, place, and time.   Psychiatric:         Mood and Affect: Mood normal.         Behavior: Behavior normal.         Thought Content: Thought content normal.         Judgment: Judgment normal.       Assessment:       1. Acute myofascial strain of lumbar region, subsequent encounter    2. Acute myofascial strain of lumbar region, initial encounter    3. Dorsalgia, unspecified        Plan:     Ms Cuba's symptoms have worsened despite conservative treatment. Now having L leg burning and weakness. Due to these findings along with the fact that the initial injury occurred 3 months ago without significant improvement, I have ordered an MRI of the lumbar spine. She requests an open MRI due to claustrophobia.    Medications Ordered This Encounter   Medications    ibuprofen (ADVIL,MOTRIN) 600 MG tablet     Sig: Take 1 tablet (600 mg total) by mouth every 8 (eight) hours as needed for Pain.     Dispense:  30 tablet     Refill:  0     Patient Instructions: Attention not to  aggravate affected area, Daily home exercises/warm soaks, Continue Physical Therapy, MRI to be scheduled once authorized (May alternate ice and heat.)   Restrictions: Sit or stand as needed, Avoid frequent bending/lifting/twisting, No lifting/pushing/pulling more than 10 lbs  Follow up in about 2 weeks (around 12/6/2022).      11/23/22 Nina requested open MRI due to claustrophobia. She also requests medication to decrease her anxiety during the procedure. I spoke with Dr Marshall who will prescribe medication to take just prior to MRI. Also spoke with Nina who will  Rx.

## 2022-11-22 NOTE — LETTER
[facility]  [address] [address]  Phone: [phone]  Fax: [phone]  [facility]: [email or web address]    Pt Name: Nina Cuba  Injury Date: 08/22/2022   Employee ID:  Date of First Treatment: 11/22/2022   Company: [employer] to [id number]      Appointment Time: 12:00 PM Arrived: ***   Provider: Perla Sky NP Time Out:***     Office Treatment:   1. Acute myofascial strain of lumbar region, subsequent encounter    2. Acute myofascial strain of lumbar region, initial encounter    3. Dorsalgia, unspecified      Medications Ordered This Encounter   Medications    ibuprofen (ADVIL,MOTRIN) 600 MG tablet      Patient Instructions: Attention not to aggravate affected area, Daily home exercises/warm soaks, Continue Physical Therapy, MRI to be scheduled once authorized (May alternate ice and heat.)    Restrictions: Regular Duty     Return Appointment: 12/6/2022 at ***

## 2022-11-28 ENCOUNTER — CLINICAL SUPPORT (OUTPATIENT)
Dept: REHABILITATION | Facility: HOSPITAL | Age: 57
End: 2022-11-28
Payer: OTHER MISCELLANEOUS

## 2022-11-28 DIAGNOSIS — M53.86 DECREASED ROM OF LUMBAR SPINE: Primary | ICD-10-CM

## 2022-11-28 PROCEDURE — 97140 MANUAL THERAPY 1/> REGIONS: CPT

## 2022-11-28 PROCEDURE — 97110 THERAPEUTIC EXERCISES: CPT

## 2022-11-28 NOTE — PROGRESS NOTES
[facility]   Physical Therapy Treatment Note     Name: Nina Cuba  Clinic Number: [id number]    Therapy Diagnosis:   Encounter Diagnosis   Name Primary?    Decreased ROM of lumbar spine Yes       Physician: Boston Cuba PA-C    Visit Date: 11/28/2022  Physician: Boston Cuba PA-C     Physician Orders: PT Eval and Treat   Medical Diagnosis from Referral:   S39.012A (ICD-10-CM) - Acute myofascial strain of lumbar region, initial encounter   Y99.0 (ICD-10-CM) - Work related injury     Evaluation Date: 10/20/2022  Authorization Period Expiration: 10/12/2023  Plan of Care Expiration: 12/31/022  Progress Note Due: 11/20/2022  Visit # / Visits authorized: 6/12  FOTO: 6     Precautions: Standard      PTA Visit #: 0/5     Time In: 210  Time Out: 255  Total Billable Time: 45 minutes    SUBJECTIVE     Pt reports: she is feeling much better from last week. Patients chief complaint is standing for prolonged periods and she will get a burning sensation in her leg. Patient is not limited at work, but that is mainly due to them reducing her work load. Patient reports she is getting an MRI       She was compliant with home exercise program.  Response to previous treatment: no adverse affect  Functional change: in progress    Pain: 5/10  Location: left back      OBJECTIVE       Treatment     Nina received the treatments listed below:      therapeutic exercises to develop strength, endurance, ROM, flexibility, posture, and core stabilization for 30 minutes including:    Nustep 6 min  B Piriformis 3x30s   LTR x 15 5s   Racquetball on wall self myofascial release  1 min x 3  Open books 15x ea side         NOT PERFORMED   Supine exercises on Lumbar heat   DKTC SB x 15 5s (held)  Pallof Press   - 7lbs   - x 20   Pallof Press + Rotation (L rotation produced symptoms - held)  - 3lbs   - x20   SL Clams 3x10 (R)  SL hip abd 3x10 (R)  PPT x 20  TrA activation x 20  Bridges w/ march x 10  Supine clam  GTB B 2x10    MANUAL THERAPY TECHNIQUES including Joint mobilizations and Soft tissue Mobilization were applied to L lumbar, SIJ and glute for 15 minutes.    Functional Dry Needling to L glute med/max with 2 (75mm) needles with mechanical winding and estim to both needles. Patient had good muscular contraction with estim today. Patient had no adverse effects from todays session.       cold pack for 0 minutes to lumbar region.      Patient Education and Home Exercises     Home Exercises Provided and Patient Education Provided     Education provided:   - HEP    Written Home Exercises Provided: yes. Exercises were reviewed and Nina was able to demonstrate them prior to the end of the session.  Nina demonstrated good  understanding of the education provided. See EMR under Patient Instructions for exercises provided during therapy sessions    ASSESSMENT     Patient presents to PT with improvement of symptoms compared to last session. Patient newly tolerated dry needling today to address glute tightness. Patient had no adverse effects. Patients chief complaint is walking/standing for prolonged periods. Cont to progress as tolerated.      Nnia Is progressing well towards her goals.   Pt prognosis is Good.     Pt will continue to benefit from skilled outpatient physical therapy to address the deficits listed in the problem list box on initial evaluation, provide pt/family education and to maximize pt's level of independence in the home and community environment.     Pt's spiritual, cultural and educational needs considered and pt agreeable to plan of care and goals.     Anticipated barriers to physical therapy: self-limiting behaviors due to pain    Goals: Short Term Goals (3 Weeks):  1. Pt will be compliant with HEP to supplement PT in decreasing pain with functional mobility.  2. Pt will perform pallof press with good control to demonstrate improved core strength.  3. Pt will improve lumbar ROM by 10 degrees in all  abnormal planes to improve functional mobility.  4. Pt will improve impaired LE MMTs by 1/2 score to improve strength for functional tasks.  Long Term Goals (6 Weeks):   1. Pt will improve FOTO score to </= 45% limited to decrease perceived limitation with maintaining/changing body position.   2. Pt will perform box lift to overhead with 20lbs with good control to demonstrate improved core strength.  3. Pt will improve impaired LE MMTs by 1 score to improve strength for functional tasks.  4. Pt will report no pain during lumbar ROM to promote functional mobility.     PLAN     Plan of care Certification: 10/20/2022 to 12/31/2022.     Outpatient Physical Therapy 2 times weekly for 6 weeks to include the following interventions: Cervical/Lumbar Traction, Manual Therapy, Moist Heat/ Ice, Neuromuscular Re-ed, Patient Education, Self Care, Therapeutic Activities, Therapeutic Exercise, and FDN.      Upon discharge from further skilled PT intervention it will determined if the need for a work conditioning program or Functional Capacity Evaluation is required to allow the injured worker to return to work with full potential achieved, continued improvement with body mechanics with advanced functional activities, and further prevention of future work-related injuries.        Tin Irene, PT

## 2022-11-29 ENCOUNTER — LAB VISIT (OUTPATIENT)
Dept: LAB | Facility: HOSPITAL | Age: 57
End: 2022-11-29
Attending: INTERNAL MEDICINE
Payer: COMMERCIAL

## 2022-11-29 DIAGNOSIS — R31.9 HEMATURIA, UNSPECIFIED TYPE: Primary | ICD-10-CM

## 2022-11-29 DIAGNOSIS — Z00.00 ANNUAL PHYSICAL EXAM: ICD-10-CM

## 2022-11-29 LAB
ALBUMIN SERPL BCP-MCNC: 4 G/DL (ref 3.5–5.2)
ALP SERPL-CCNC: 76 U/L (ref 55–135)
ALT SERPL W/O P-5'-P-CCNC: 15 U/L (ref 10–44)
ANION GAP SERPL CALC-SCNC: 7 MMOL/L (ref 8–16)
AST SERPL-CCNC: 15 U/L (ref 10–40)
BASOPHILS # BLD AUTO: 0.05 K/UL (ref 0–0.2)
BASOPHILS NFR BLD: 0.9 % (ref 0–1.9)
BILIRUB SERPL-MCNC: 0.3 MG/DL (ref 0.1–1)
BUN SERPL-MCNC: 22 MG/DL (ref 6–20)
CALCIUM SERPL-MCNC: 9 MG/DL (ref 8.7–10.5)
CHLORIDE SERPL-SCNC: 106 MMOL/L (ref 95–110)
CHOLEST SERPL-MCNC: 198 MG/DL (ref 120–199)
CHOLEST/HDLC SERPL: 3 {RATIO} (ref 2–5)
CO2 SERPL-SCNC: 28 MMOL/L (ref 23–29)
CREAT SERPL-MCNC: 0.8 MG/DL (ref 0.5–1.4)
DIFFERENTIAL METHOD: ABNORMAL
EOSINOPHIL # BLD AUTO: 0.1 K/UL (ref 0–0.5)
EOSINOPHIL NFR BLD: 2.5 % (ref 0–8)
ERYTHROCYTE [DISTWIDTH] IN BLOOD BY AUTOMATED COUNT: 13.8 % (ref 11.5–14.5)
EST. GFR  (NO RACE VARIABLE): >60 ML/MIN/1.73 M^2
ESTIMATED AVG GLUCOSE: 114 MG/DL (ref 68–131)
GLUCOSE SERPL-MCNC: 103 MG/DL (ref 70–110)
HBA1C MFR BLD: 5.6 % (ref 4–5.6)
HCT VFR BLD AUTO: 39.2 % (ref 37–48.5)
HDLC SERPL-MCNC: 67 MG/DL (ref 40–75)
HDLC SERPL: 33.8 % (ref 20–50)
HGB BLD-MCNC: 12.5 G/DL (ref 12–16)
IMM GRANULOCYTES # BLD AUTO: 0.01 K/UL (ref 0–0.04)
IMM GRANULOCYTES NFR BLD AUTO: 0.2 % (ref 0–0.5)
LDLC SERPL CALC-MCNC: 118.6 MG/DL (ref 63–159)
LYMPHOCYTES # BLD AUTO: 1.8 K/UL (ref 1–4.8)
LYMPHOCYTES NFR BLD: 32.3 % (ref 18–48)
MCH RBC QN AUTO: 29 PG (ref 27–31)
MCHC RBC AUTO-ENTMCNC: 31.9 G/DL (ref 32–36)
MCV RBC AUTO: 91 FL (ref 82–98)
MONOCYTES # BLD AUTO: 0.5 K/UL (ref 0.3–1)
MONOCYTES NFR BLD: 8.3 % (ref 4–15)
NEUTROPHILS # BLD AUTO: 3.1 K/UL (ref 1.8–7.7)
NEUTROPHILS NFR BLD: 55.8 % (ref 38–73)
NONHDLC SERPL-MCNC: 131 MG/DL
NRBC BLD-RTO: 0 /100 WBC
PLATELET # BLD AUTO: 332 K/UL (ref 150–450)
PMV BLD AUTO: 10.2 FL (ref 9.2–12.9)
POTASSIUM SERPL-SCNC: 4.3 MMOL/L (ref 3.5–5.1)
PROT SERPL-MCNC: 7.4 G/DL (ref 6–8.4)
RBC # BLD AUTO: 4.31 M/UL (ref 4–5.4)
SODIUM SERPL-SCNC: 141 MMOL/L (ref 136–145)
TRIGL SERPL-MCNC: 62 MG/DL (ref 30–150)
TSH SERPL DL<=0.005 MIU/L-ACNC: 1.83 UIU/ML (ref 0.4–4)
WBC # BLD AUTO: 5.63 K/UL (ref 3.9–12.7)

## 2022-11-29 PROCEDURE — 83036 HEMOGLOBIN GLYCOSYLATED A1C: CPT | Performed by: INTERNAL MEDICINE

## 2022-11-29 PROCEDURE — 80061 LIPID PANEL: CPT | Performed by: INTERNAL MEDICINE

## 2022-11-29 PROCEDURE — 84443 ASSAY THYROID STIM HORMONE: CPT | Performed by: INTERNAL MEDICINE

## 2022-11-29 PROCEDURE — 36415 COLL VENOUS BLD VENIPUNCTURE: CPT | Performed by: INTERNAL MEDICINE

## 2022-11-29 PROCEDURE — 85025 COMPLETE CBC W/AUTO DIFF WBC: CPT | Performed by: INTERNAL MEDICINE

## 2022-11-29 PROCEDURE — 80053 COMPREHEN METABOLIC PANEL: CPT | Performed by: INTERNAL MEDICINE

## 2022-11-29 RX ORDER — LORAZEPAM 0.5 MG/1
TABLET ORAL
Qty: 30 TABLET | Refills: 1 | Status: SHIPPED | OUTPATIENT
Start: 2022-11-29 | End: 2023-03-06

## 2022-11-29 NOTE — TELEPHONE ENCOUNTER
----- Message from Lizz Funes sent at 11/29/2022  3:12 PM CST -----  Contact: Pt [phone]  Requesting an RX refill or new RX.  Is this a refill or new RX: Refill  RX name and strength (copy/paste from chart):  LORazepam (ATIVAN) 0.5 MG tablet  Is this a 30 day or 90 day RX: 30  Pharmacy name and phone # (copy/paste from chart):    [facility] #[id number] - [place] - [address]  [address] [place] [address]  Phone: [phone] Fax: [phone]    Please call and advise.    Thank You

## 2022-11-29 NOTE — TELEPHONE ENCOUNTER
No new care gaps identified.  [facility] Embedded Care Gaps. Reference number: [id number]. 11/29/2022   4:13:28 PM CST

## 2022-11-30 ENCOUNTER — TELEPHONE (OUTPATIENT)
Dept: URGENT CARE | Facility: CLINIC | Age: 57
End: 2022-11-30
Payer: COMMERCIAL

## 2022-11-30 NOTE — TELEPHONE ENCOUNTER
Nina has been under the care of Perla Howard for her injury and open MRI has been ordered for further assessment. Pre-procedure medication has been requested for sedation. I called and discussed with Nina about the use of a single dose of Xanax. She denies any allergies and has taken the medication in past without any issues. Rx for Xanax 0.5 mg single dose to be taken about 30 minutes prior to procedure (1 tablet only). She is aware to have someone available to drive home post-imaging. All questions answered.

## 2022-12-02 ENCOUNTER — CLINICAL SUPPORT (OUTPATIENT)
Dept: REHABILITATION | Facility: HOSPITAL | Age: 57
End: 2022-12-02
Payer: OTHER MISCELLANEOUS

## 2022-12-02 DIAGNOSIS — M53.86 DECREASED ROM OF LUMBAR SPINE: Primary | ICD-10-CM

## 2022-12-02 PROCEDURE — 97110 THERAPEUTIC EXERCISES: CPT | Mod: CQ

## 2022-12-02 NOTE — PROGRESS NOTES
[facility]   Physical Therapy Treatment Note     Name: Nina Cuba  Clinic Number: [id number]    Therapy Diagnosis:   Encounter Diagnosis   Name Primary?    Decreased ROM of lumbar spine Yes       Physician: Boston Cuba PA-C    Visit Date: 12/2/2022  Physician: Boston Cuba PA-C     Physician Orders: PT Eval and Treat   Medical Diagnosis from Referral:   S39.012A (ICD-10-CM) - Acute myofascial strain of lumbar region, initial encounter   Y99.0 (ICD-10-CM) - Work related injury     Evaluation Date: 10/20/2022  Authorization Period Expiration: 10/12/2023  Plan of Care Expiration: 12/31/022  Progress Note Due: 11/20/2022  Visit # / Visits authorized: 6/12  FOTO: 6     Precautions: Standard      PTA Visit #: 1/5     Time In: 1:55 pm  Time Out: 2:30 pm  Total Billable Time: 25 minutes    SUBJECTIVE     Pt reports: she is feeling much better from last session. The needling really helped. She is trying to be more active.      She was compliant with home exercise program.  Response to previous treatment: no adverse affect  Functional change: in progress    Pain: 4/10  Location: left back      OBJECTIVE       Treatment     Nina received the treatments listed below:      therapeutic exercises to develop strength, endurance, ROM, flexibility, posture, and core stabilization for 25 minutes including:    Nustep 6 min  B Piriformis 3x30s   LTR x 15 5s   Racquetball on wall self myofascial release  1 min x 3  Open books 15x ea side   B Shuttle hip ext .5 cord 2x10        NOT PERFORMED   Supine exercises on Lumbar heat   DKTC SB x 15 5s (held)  Pallof Press   - 7lbs   - x 20   Pallof Press + Rotation (L rotation produced symptoms - held)  - 3lbs   - x20   SL Clams 3x10 (R)  SL hip abd 3x10 (R)  PPT x 20  TrA activation x 20  Bridges w/ march x 10  Supine clam GTB B 2x10    MANUAL THERAPY TECHNIQUES including Joint mobilizations and Soft tissue Mobilization were applied to L lumbar, SIJ  and glute for 0 minutes.    Functional Dry Needling to L glute med/max with 2 (75mm) needles with mechanical winding and estim to both needles. Patient had good muscular contraction with estim today. Patient had no adverse effects from todays session.       cold pack for 0 minutes to lumbar region.      Patient Education and Home Exercises     Home Exercises Provided and Patient Education Provided     Education provided:   - HEP    Written Home Exercises Provided: yes. Exercises were reviewed and Nina was able to demonstrate them prior to the end of the session.  Nina demonstrated good  understanding of the education provided. See EMR under Patient Instructions for exercises provided during therapy sessions    ASSESSMENT     Patient presents to PT with improvement of symptoms since FDN last session. Patient tolerated shuttle hip ext without pain. Cont to progress as tolerated.      Nina Is progressing well towards her goals.   Pt prognosis is Good.     Pt will continue to benefit from skilled outpatient physical therapy to address the deficits listed in the problem list box on initial evaluation, provide pt/family education and to maximize pt's level of independence in the home and community environment.     Pt's spiritual, cultural and educational needs considered and pt agreeable to plan of care and goals.     Anticipated barriers to physical therapy: self-limiting behaviors due to pain    Goals: Short Term Goals (3 Weeks):  1. Pt will be compliant with HEP to supplement PT in decreasing pain with functional mobility.  2. Pt will perform pallof press with good control to demonstrate improved core strength.  3. Pt will improve lumbar ROM by 10 degrees in all abnormal planes to improve functional mobility.  4. Pt will improve impaired LE MMTs by 1/2 score to improve strength for functional tasks.  Long Term Goals (6 Weeks):   1. Pt will improve FOTO score to </= 45% limited to decrease perceived limitation with  maintaining/changing body position.   2. Pt will perform box lift to overhead with 20lbs with good control to demonstrate improved core strength.  3. Pt will improve impaired LE MMTs by 1 score to improve strength for functional tasks.  4. Pt will report no pain during lumbar ROM to promote functional mobility.     PLAN     Plan of care Certification: 10/20/2022 to 12/31/2022.     Outpatient Physical Therapy 2 times weekly for 6 weeks to include the following interventions: Cervical/Lumbar Traction, Manual Therapy, Moist Heat/ Ice, Neuromuscular Re-ed, Patient Education, Self Care, Therapeutic Activities, Therapeutic Exercise, and FDN.      Upon discharge from further skilled PT intervention it will determined if the need for a work conditioning program or Functional Capacity Evaluation is required to allow the injured worker to return to work with full potential achieved, continued improvement with body mechanics with advanced functional activities, and further prevention of future work-related injuries.        Boston Guerrier, PTA

## 2022-12-05 ENCOUNTER — DOCUMENTATION ONLY (OUTPATIENT)
Dept: REHABILITATION | Facility: HOSPITAL | Age: 57
End: 2022-12-05
Payer: COMMERCIAL

## 2022-12-05 NOTE — PROGRESS NOTES
PT/PTA met face to face to discuss pt's treatment plan and progress towards established goals. Pt will be seen by a physical therapist minimally every 6th visit or every 30 days.    Boston Guerrier PTA    Face to Face PTA Conference performed with Kristine Cadena PTA and Boston Guerrier PTA regarding patient's current status, overall progress, and plan of care.    Tin Irene, PT

## 2022-12-06 ENCOUNTER — OFFICE VISIT (OUTPATIENT)
Dept: URGENT CARE | Facility: CLINIC | Age: 57
End: 2022-12-06
Payer: OTHER MISCELLANEOUS

## 2022-12-06 VITALS
OXYGEN SATURATION: 96 % | DIASTOLIC BLOOD PRESSURE: 79 MMHG | SYSTOLIC BLOOD PRESSURE: 112 MMHG | HEIGHT: 67 IN | TEMPERATURE: 99 F | HEART RATE: 58 BPM | RESPIRATION RATE: 18 BRPM | BODY MASS INDEX: 29.66 KG/M2 | WEIGHT: 189 LBS

## 2022-12-06 DIAGNOSIS — S39.012D ACUTE MYOFASCIAL STRAIN OF LUMBAR REGION, SUBSEQUENT ENCOUNTER: ICD-10-CM

## 2022-12-06 DIAGNOSIS — Z02.6 ENCOUNTER RELATED TO WORKER'S COMPENSATION CLAIM: Primary | ICD-10-CM

## 2022-12-06 PROCEDURE — 99214 PR OFFICE/OUTPT VISIT, EST, LEVL IV, 30-39 MIN: ICD-10-PCS | Mod: S$GLB,,,

## 2022-12-06 PROCEDURE — 99214 OFFICE O/P EST MOD 30 MIN: CPT | Mod: S$GLB,,,

## 2022-12-06 NOTE — LETTER
[facility]  [address] [address]  Phone: [phone]  Fax: [phone]  [facility]: [id number]    Pt Name: Nina Cuba  Injury Date: 08/22/2022   Employee ID: [id number] Date of First Treatment: 12/06/2022   Company: [employer] to record [employer]      Appointment Time: 11:15 AM Arrived: 11:39am   Provider: Amilcar Marshall,  Time Out:1:10pm     Office Treatment:   1. Encounter related to worker's compensation claim    2. Acute myofascial strain of lumbar region, subsequent encounter          Patient Instructions: Continue Physical Therapy    Restrictions: No lifting/pushing/pulling more than 10 lbs, Avoid frequent bending/lifting/twisting     Return Appointment: 12/20/2022 at 10:30am

## 2022-12-06 NOTE — PROGRESS NOTES
Subjective:       Patient ID: Nina Cuba is a 57 y.o. female.    Chief Complaint: Back Pain (low)    RV Back Pain (DOI 08-22-22) She works for [employer] as a . Patient is following up on low back pain. Pain 5/10. Patient is taking her meds. She is doing her PT and HEP. [facility]    Back Pain  Associated symptoms include headaches and numbness. Pertinent negatives include no abdominal pain, bladder incontinence or bowel incontinence.     Constitution: Negative.   HENT: Negative.     Neck: neck negative. Positive for neck stiffness.   Cardiovascular: Negative.    Eyes: Negative.    Respiratory: Negative.     Gastrointestinal:  Negative for abdominal pain, nausea, vomiting and bowel incontinence.   Endocrine: negative.   Genitourinary: Negative.  Negative for bladder incontinence.   Musculoskeletal:  Positive for pain, abnormal ROM of joint, back pain, muscle cramps and muscle ache.   Skin: Negative.  Negative for erythema.   Neurological:  Positive for loss of balance, headaches, numbness and tingling.   Psychiatric/Behavioral:  Positive for sleep disturbance.       Objective:      Physical Exam  Vitals reviewed.   Constitutional:       General: She is not in acute distress.  HENT:      Head: Normocephalic.   Eyes:      General: Lids are normal.      Conjunctiva/sclera: Conjunctivae normal.   Musculoskeletal:      Cervical back: No pain with movement.      Lumbar back: Tenderness present. No swelling or deformity. Decreased range of motion. Negative right straight leg raise test and negative left straight leg raise test.        Back:       Comments: No gross deformity noted to lumbar spine.  Tenderness on palpation to the left lateral lumbar and upper left gluteus region.  Pain is same area with flexion and left side bending.  Forward flexion with fingertips to approximately mid shin area.  Otherwise, she is full range of motion other planes.  She is able to stand up from a seated position and  climb on and off the examination table without difficulty or assistance.  Symmetrical lower extremity reflexes bilaterally.  Left lower extremity is slightly weaker than the right.  Normal gait.  Painless squat.   Skin:     General: Skin is warm.      Capillary Refill: Capillary refill takes less than 2 seconds.      Findings: No erythema.   Neurological:      General: No focal deficit present.      Mental Status: She is alert and oriented to person, place, and time.      Gait: Gait is intact.      Deep Tendon Reflexes: Reflexes are normal and symmetric.   Psychiatric:         Attention and Perception: Attention normal.         Mood and Affect: Mood and affect normal.         Speech: Speech normal.         Behavior: Behavior normal. Behavior is cooperative.       Assessment:       1. Encounter related to worker's compensation claim    2. Acute myofascial strain of lumbar region, subsequent encounter          Plan:       MRI evaluation still pending approval.  Subjectively and clinically she is improved since her last evaluation and also noted in the most recent physical therapy note.  I have asked her to continue with her physical therapy and perform her home exercises on a regular basis.  Continue her current work limitations reassess in 2 weeks     Patient Instructions: Continue Physical Therapy   Restrictions: No lifting/pushing/pulling more than 10 lbs, Avoid frequent bending/lifting/twisting  Follow up in about 2 weeks (around 12/20/2022).

## 2022-12-07 ENCOUNTER — CLINICAL SUPPORT (OUTPATIENT)
Dept: REHABILITATION | Facility: HOSPITAL | Age: 57
End: 2022-12-07
Payer: OTHER MISCELLANEOUS

## 2022-12-07 DIAGNOSIS — M53.86 DECREASED ROM OF LUMBAR SPINE: Primary | ICD-10-CM

## 2022-12-07 PROCEDURE — 97110 THERAPEUTIC EXERCISES: CPT | Mod: CQ

## 2022-12-07 NOTE — PROGRESS NOTES
[facility]   Physical Therapy Treatment Note     Name: Nina Cuba  Clinic Number: [id number]    Therapy Diagnosis:   Encounter Diagnosis   Name Primary?    Decreased ROM of lumbar spine Yes         Physician: Boston Cuba PA-C    Visit Date: 12/7/2022  Physician: Boston Cuba PA-C     Physician Orders: PT Eval and Treat   Medical Diagnosis from Referral:   S39.012A (ICD-10-CM) - Acute myofascial strain of lumbar region, initial encounter   Y99.0 (ICD-10-CM) - Work related injury     Evaluation Date: 10/20/2022  Authorization Period Expiration: 10/12/2023  Plan of Care Expiration: 12/31/022  Progress Note Due: 11/20/2022  Visit # / Visits authorized: 6/12  FOTO: 6     Precautions: Standard      PTA Visit #: 1/5     Time In: 3:30 pm  Time Out: 400 pm  Total Billable Time: 45 minutes    SUBJECTIVE     Pt reports: no new complaints    She was compliant with home exercise program.  Response to previous treatment: no adverse affect  Functional change: in progress    Pain: 1/10  Location: left back      OBJECTIVE       Treatment     Nina received the treatments listed below:      therapeutic exercises to develop strength, endurance, ROM, flexibility, posture, and core stabilization for 45 minutes including:    Nustep 6 min   B Piriformis 3x30s   LTR x 15 5s   Racquetball on wall self myofascial release  1 min x 3  Open books 15x ea side   B Shuttle hip ext .5 cord 2x10  Bridge c gait belt 2x10        NOT PERFORMED   Supine exercises on Lumbar heat   DKTC SB x 15 5s (held)  Pallof Press   - 7lbs   - x 20   Pallof Press + Rotation (L rotation produced symptoms - held)  - 3lbs   - x20   SL Clams 3x10 (R)  SL hip abd 3x10 (R)  PPT x 20  TrA activation x 20  Bridges w/ march x 10  Supine clam GTB B 2x10    MANUAL THERAPY TECHNIQUES including Joint mobilizations and Soft tissue Mobilization were applied to L lumbar, SIJ and glute for 0 minutes.    Functional Dry Needling to L glute  med/max with 2 (75mm) needles with mechanical winding and estim to both needles. Patient had good muscular contraction with estim today. Patient had no adverse effects from todays session.       cold pack for 0 minutes to lumbar region.      Patient Education and Home Exercises     Home Exercises Provided and Patient Education Provided     Education provided:   - HEP    Written Home Exercises Provided: yes. Exercises were reviewed and Nina was able to demonstrate them prior to the end of the session.  Nina demonstrated good  understanding of the education provided. See EMR under Patient Instructions for exercises provided during therapy sessions    ASSESSMENT     Patient presents to PT with improvement of symptoms since FDN session. Patient tolerated treatment without pain. Cont to progress as tolerated.      Nina Is progressing well towards her goals.   Pt prognosis is Good.     Pt will continue to benefit from skilled outpatient physical therapy to address the deficits listed in the problem list box on initial evaluation, provide pt/family education and to maximize pt's level of independence in the home and community environment.     Pt's spiritual, cultural and educational needs considered and pt agreeable to plan of care and goals.     Anticipated barriers to physical therapy: self-limiting behaviors due to pain    Goals: Short Term Goals (3 Weeks):  1. Pt will be compliant with HEP to supplement PT in decreasing pain with functional mobility.  2. Pt will perform pallof press with good control to demonstrate improved core strength.  3. Pt will improve lumbar ROM by 10 degrees in all abnormal planes to improve functional mobility.  4. Pt will improve impaired LE MMTs by 1/2 score to improve strength for functional tasks.  Long Term Goals (6 Weeks):   1. Pt will improve FOTO score to </= 45% limited to decrease perceived limitation with maintaining/changing body position.   2. Pt will perform box lift to overhead  with 20lbs with good control to demonstrate improved core strength.  3. Pt will improve impaired LE MMTs by 1 score to improve strength for functional tasks.  4. Pt will report no pain during lumbar ROM to promote functional mobility.     PLAN     Plan of care Certification: 10/20/2022 to 12/31/2022.     Outpatient Physical Therapy 2 times weekly for 6 weeks to include the following interventions: Cervical/Lumbar Traction, Manual Therapy, Moist Heat/ Ice, Neuromuscular Re-ed, Patient Education, Self Care, Therapeutic Activities, Therapeutic Exercise, and FDN.      Upon discharge from further skilled PT intervention it will determined if the need for a work conditioning program or Functional Capacity Evaluation is required to allow the injured worker to return to work with full potential achieved, continued improvement with body mechanics with advanced functional activities, and further prevention of future work-related injuries.        Boston Guerrier, PTA

## 2022-12-15 ENCOUNTER — CLINICAL SUPPORT (OUTPATIENT)
Dept: REHABILITATION | Facility: HOSPITAL | Age: 57
End: 2022-12-15
Payer: OTHER MISCELLANEOUS

## 2022-12-15 DIAGNOSIS — M53.86 DECREASED ROM OF LUMBAR SPINE: Primary | ICD-10-CM

## 2022-12-15 PROCEDURE — 97110 THERAPEUTIC EXERCISES: CPT | Mod: CQ

## 2022-12-15 NOTE — PROGRESS NOTES
[facility]   Physical Therapy Treatment Note     Name: Nina Cuba  Clinic Number: [id number]    Therapy Diagnosis:   Encounter Diagnosis   Name Primary?    Decreased ROM of lumbar spine Yes         Physician: Boston Cuba PA-C    Visit Date: 12/15/2022  Physician: Boston Cuba PA-C     Physician Orders: PT Eval and Treat   Medical Diagnosis from Referral:   S39.012A (ICD-10-CM) - Acute myofascial strain of lumbar region, initial encounter   Y99.0 (ICD-10-CM) - Work related injury     Evaluation Date: 10/20/2022  Authorization Period Expiration: 10/12/2023  Plan of Care Expiration: 12/31/022  Progress Note Due: 11/20/2022  Visit # / Visits authorized: 9/12  FOTO: 6     Precautions: Standard      PTA Visit #: 3/5     Time In: 400 pm  Time Out: 415 pm  Total Billable Time: 45 minutes    SUBJECTIVE     Pt reports: the pain is mostly in one small spot on the (L) side. She is scheduled for an MRI later this month.    She was compliant with home exercise program.  Response to previous treatment: no adverse affect  Functional change: in progress    Pain: 1/10  Location: left back      OBJECTIVE       Treatment     Nina received the treatments listed below:      therapeutic exercises to develop strength, endurance, ROM, flexibility, posture, and core stabilization for 40 minutes including:    Nustep 6 min   B Piriformis 3x30s   LTR x 15 5s   Racquetball on wall self myofascial release  1 min x 3  Open books 15x ea side   B Shuttle hip ext .5 cord 2x10  Bridge c gait belt 2x10        NOT PERFORMED   Supine exercises on Lumbar heat   DKTC SB x 15 5s (held)  Pallof Press   - 7lbs   - x 20   Pallof Press + Rotation (L rotation produced symptoms - held)  - 3lbs   - x20   SL Clams 3x10 (R)  SL hip abd 3x10 (R)  PPT x 20  TrA activation x 20  Bridges w/ march x 10  Supine clam GTB B 2x10    MANUAL THERAPY TECHNIQUES: Soft tissue Mobilization were applied to L lumbar, SIJ and glute for 5  minutes.  Percussion gun      Functional Dry Needling to L glute med/max with 2 (75mm) needles with mechanical winding and estim to both needles. Patient had good muscular contraction with estim today. Patient had no adverse effects from todays session.       cold pack for 0 minutes to lumbar region.      Patient Education and Home Exercises     Home Exercises Provided and Patient Education Provided     Education provided:   - HEP    Written Home Exercises Provided: yes. Exercises were reviewed and Nina was able to demonstrate them prior to the end of the session.  Nina demonstrated good  understanding of the education provided. See EMR under Patient Instructions for exercises provided during therapy sessions    ASSESSMENT     Patient presents to PT with improvement of symptoms since starting. Session was tolerated well w/o pain. Good response to percussion gun. Discussed remaining authorized visits.     Nina Is progressing well towards her goals.   Pt prognosis is Good.     Pt will continue to benefit from skilled outpatient physical therapy to address the deficits listed in the problem list box on initial evaluation, provide pt/family education and to maximize pt's level of independence in the home and community environment.     Pt's spiritual, cultural and educational needs considered and pt agreeable to plan of care and goals.     Anticipated barriers to physical therapy: self-limiting behaviors due to pain    Goals: Short Term Goals (3 Weeks):  1. Pt will be compliant with HEP to supplement PT in decreasing pain with functional mobility.  2. Pt will perform pallof press with good control to demonstrate improved core strength.  3. Pt will improve lumbar ROM by 10 degrees in all abnormal planes to improve functional mobility.  4. Pt will improve impaired LE MMTs by 1/2 score to improve strength for functional tasks.  Long Term Goals (6 Weeks):   1. Pt will improve FOTO score to </= 45% limited to decrease  perceived limitation with maintaining/changing body position.   2. Pt will perform box lift to overhead with 20lbs with good control to demonstrate improved core strength.  3. Pt will improve impaired LE MMTs by 1 score to improve strength for functional tasks.  4. Pt will report no pain during lumbar ROM to promote functional mobility.     PLAN     Plan of care Certification: 10/20/2022 to 12/31/2022.     Outpatient Physical Therapy 2 times weekly for 6 weeks to include the following interventions: Cervical/Lumbar Traction, Manual Therapy, Moist Heat/ Ice, Neuromuscular Re-ed, Patient Education, Self Care, Therapeutic Activities, Therapeutic Exercise, and FDN.      Upon discharge from further skilled PT intervention it will determined if the need for a work conditioning program or Functional Capacity Evaluation is required to allow the injured worker to return to work with full potential achieved, continued improvement with body mechanics with advanced functional activities, and further prevention of future work-related injuries.        Boston Guerrier, PTA

## 2022-12-20 ENCOUNTER — OFFICE VISIT (OUTPATIENT)
Dept: URGENT CARE | Facility: CLINIC | Age: 57
End: 2022-12-20
Payer: OTHER MISCELLANEOUS

## 2022-12-20 VITALS
DIASTOLIC BLOOD PRESSURE: 63 MMHG | OXYGEN SATURATION: 97 % | HEART RATE: 76 BPM | WEIGHT: 189 LBS | RESPIRATION RATE: 18 BRPM | HEIGHT: 67 IN | SYSTOLIC BLOOD PRESSURE: 90 MMHG | TEMPERATURE: 98 F | BODY MASS INDEX: 29.66 KG/M2

## 2022-12-20 DIAGNOSIS — S39.012D ACUTE MYOFASCIAL STRAIN OF LUMBAR REGION, SUBSEQUENT ENCOUNTER: Primary | ICD-10-CM

## 2022-12-20 DIAGNOSIS — Z02.6 ENCOUNTER RELATED TO WORKER'S COMPENSATION CLAIM: ICD-10-CM

## 2022-12-20 DIAGNOSIS — S39.012A ACUTE MYOFASCIAL STRAIN OF LUMBAR REGION, INITIAL ENCOUNTER: ICD-10-CM

## 2022-12-20 PROCEDURE — 99214 OFFICE O/P EST MOD 30 MIN: CPT | Mod: S$GLB,,, | Performed by: NURSE PRACTITIONER

## 2022-12-20 PROCEDURE — 99214 PR OFFICE/OUTPT VISIT, EST, LEVL IV, 30-39 MIN: ICD-10-PCS | Mod: S$GLB,,, | Performed by: NURSE PRACTITIONER

## 2022-12-20 RX ORDER — PANTOPRAZOLE SODIUM 40 MG/1
40 TABLET, DELAYED RELEASE ORAL DAILY
Qty: 90 TABLET | Refills: 3 | Status: SHIPPED | OUTPATIENT
Start: 2022-12-20 | End: 2023-03-15 | Stop reason: SDUPTHER

## 2022-12-20 RX ORDER — IBUPROFEN 600 MG/1
600 TABLET ORAL EVERY 8 HOURS PRN
Qty: 30 TABLET | Refills: 0 | Status: SHIPPED | OUTPATIENT
Start: 2022-12-20 | End: 2023-09-18

## 2022-12-20 NOTE — LETTER
[facility]  [address] [address]  Phone: [phone]  Fax: [phone]  [facility]: [id number]     Name: Nina Cuba  Injury Date: 08/22/2022   Employee ID: [id number] Date of Treatment: 12/20/2022   Company: [employer]      Appointment Time: 10:30 AM Arrived:  10:48 AM    Provider: Perla Sky NP Time Out: 11:53 AM      Office Treatment:   1. Acute myofascial strain of lumbar region, subsequent encounter    2. Encounter related to worker's compensation claim    3. Acute myofascial strain of lumbar region, initial encounter      Medications Ordered This Encounter   Medications    ibuprofen (ADVIL,MOTRIN) 600 MG tablet      Patient Instructions: Attention not to aggravate affected area, Daily home exercises/warm soaks, Continue Physical Therapy, MRI to be scheduled once authorized      Restrictions: No lifting/pushing/pulling more than 10 lbs, Avoid frequent bending/lifting/twisting     Return Appointment: 1/3/2023 at 10:15 AM JONATHAN

## 2022-12-20 NOTE — TELEPHONE ENCOUNTER
----- Message from Yumiko Hendrix sent at 12/20/2022  9:25 AM CST -----  Regarding: Refill  Contact: pt @ [phone]  Rx Refill/Request    Is this a Refill or New Rx:refill    Rx Name and Strength:pantoprazole (PROTONIX) 40 MG tablet    Preferred Pharmacy with phone number:    [facility]/pharmacy #[id number] - [place] - [address]  [address] [address]  Phone: [phone] Fax: [phone]    Communication Preference:pt @ [phone]  Additional Information:

## 2022-12-20 NOTE — TELEPHONE ENCOUNTER
----- Message from Yumiko Hendrix sent at 12/20/2022  9:25 AM CST -----  Regarding: Refill  Contact: pt @ [phone]  Rx Refill/Request    Is this a Refill or New Rx:refill    Rx Name and Strength:pantoprazole (PROTONIX) 40 MG tablet    Preferred Pharmacy with phone number:    [facility]/pharmacy #[id number] - [place] - [address]  [address] [address]  Phone: [phone] Fax: [phone]    Communication Preference:pt @ [phone]  Additional Information:

## 2022-12-20 NOTE — PROGRESS NOTES
"Subjective:       Patient ID: Nina Cuba is a 57 y.o. female.    Chief Complaint: Back Pain     Follow-up of Back Pain ( DOI 08-22-22 ) Working for [employer]. Pain score 5-6/10 with complaints of Stiffness, Intermittent Stabbing pain of LT Buttock, Constant Aching /Soreness pain, Intermittent Numbness/Tingling of LT Arm, ROM Impaired. Taking Gabapentin, IBP 600mg, ZanaFlex, going to PT. SH    She is scheduled for the  open MRI on 12/30/22. She has been going to PT and says it's helping "a lot". Had dry needling which she says gave a lot of relief. Reports that she doesn't feel as weak in the L leg as she did prior to PT.Is taking Ibuprofen on a prn basis and tolerating well. Has taken leave from work so currently not working. MWT    Back Pain  Associated symptoms include numbness. Pertinent negatives include no abdominal pain, bladder incontinence or bowel incontinence.   Gastrointestinal:  Negative for abdominal pain, nausea, vomiting and bowel incontinence.   Genitourinary:  Negative for bladder incontinence.   Musculoskeletal:  Positive for pain, joint pain, abnormal ROM of joint, back pain and muscle ache. Negative for trauma, joint swelling and muscle cramps.   Neurological:  Positive for numbness and tingling.      Objective:      Physical Exam  Constitutional:       General: She is not in acute distress.     Appearance: Normal appearance.   HENT:      Right Ear: External ear normal.      Left Ear: External ear normal.   Eyes:      Conjunctiva/sclera: Conjunctivae normal.   Cardiovascular:      Pulses: Normal pulses.   Pulmonary:      Effort: Pulmonary effort is normal.   Musculoskeletal:         General: Tenderness present. No swelling.      Lumbar back: Tenderness present. No swelling or deformity. Decreased range of motion. Positive left straight leg raise test. Negative right straight leg raise test.        Back:       Comments: Pain to L lower back with radiation to L leg to knee at " times. Increased pain with flexion approx 60 degrees and with extension 10 degrees. Less pain with bilateral bending/rotation.   Skin:     General: Skin is warm.   Neurological:      General: No focal deficit present.      Mental Status: She is alert and oriented to person, place, and time.      Deep Tendon Reflexes:      Reflex Scores:       Patellar reflexes are 2+ on the right side and 2+ on the left side.       Achilles reflexes are 2+ on the right side and 2+ on the left side.  Psychiatric:         Mood and Affect: Mood normal.         Behavior: Behavior normal.         Thought Content: Thought content normal.         Judgment: Judgment normal.       Assessment:       1. Acute myofascial strain of lumbar region, subsequent encounter    2. Encounter related to worker's compensation claim          Plan:       Nina is scheduled for MRI of the lumbar spine on 12/30/22. She continues to have LBP with radiation to LLE. She still has a couple of sessions of PT scheduled prior to the MRI. Will RTC on 1/3/22 to review results. In the meantime I have refilled the Ibuprofen which she is taking on an as needed basis.     Patient Instructions: Attention not to aggravate affected area, Daily home exercises/warm soaks, Continue Physical Therapy, MRI to be scheduled once authorized   Restrictions: No lifting/pushing/pulling more than 10 lbs, Avoid frequent bending/lifting/twisting  Follow up in about 2 weeks (around 1/3/2023).

## 2022-12-24 ENCOUNTER — HOSPITAL ENCOUNTER (OUTPATIENT)
Dept: RADIOLOGY | Facility: HOSPITAL | Age: 57
Discharge: HOME | End: 2022-12-24
Attending: INTERNAL MEDICINE
Payer: COMMERCIAL

## 2022-12-24 DIAGNOSIS — R31.9 HEMATURIA, UNSPECIFIED TYPE: ICD-10-CM

## 2022-12-24 PROCEDURE — 76770 US EXAM ABDO BACK WALL COMP: CPT | Mod: 26,,, | Performed by: RADIOLOGY

## 2022-12-24 PROCEDURE — 76770 US RETROPERITONEAL COMPLETE: ICD-10-PCS | Mod: 26,,, | Performed by: RADIOLOGY

## 2022-12-24 PROCEDURE — 76770 US EXAM ABDO BACK WALL COMP: CPT | Mod: TC

## 2022-12-27 ENCOUNTER — TELEPHONE (OUTPATIENT)
Dept: INTERNAL MEDICINE | Facility: CLINIC | Age: 57
End: 2022-12-27
Payer: COMMERCIAL

## 2022-12-27 NOTE — TELEPHONE ENCOUNTER
Checked to see if we had any available appointment and we did not suggested that she go to ER or urgent care to get tested .Patient verbalized understanding .

## 2022-12-27 NOTE — TELEPHONE ENCOUNTER
----- Message from Mee Coronado sent at 12/27/2022  8:32 AM CST -----  Contact: [phone]  Pt requesting a call from the office in regards to getting an appointment for possible flu. Please call and advise, Thanks

## 2022-12-28 ENCOUNTER — CLINICAL SUPPORT (OUTPATIENT)
Dept: REHABILITATION | Facility: HOSPITAL | Age: 57
End: 2022-12-28
Payer: OTHER MISCELLANEOUS

## 2022-12-28 DIAGNOSIS — M53.86 DECREASED ROM OF LUMBAR SPINE: Primary | ICD-10-CM

## 2022-12-28 PROCEDURE — 97110 THERAPEUTIC EXERCISES: CPT | Mod: CQ

## 2022-12-28 NOTE — PROGRESS NOTES
[facility]   Physical Therapy Treatment Note     Name: Nina Mock Lambiliana  [facility] Number: [id number]    Therapy Diagnosis:   Encounter Diagnosis   Name Primary?    Decreased ROM of lumbar spine Yes         Physician: Boston Cuba PA-C    Visit Date: 12/28/2022  Physician: Boston Cuba PA-C     Physician Orders: PT Eval and Treat   Medical Diagnosis from Referral:   S39.012A (ICD-10-CM) - Acute myofascial strain of lumbar region, initial encounter   Y99.0 (ICD-10-CM) - Work related injury     Evaluation Date: 10/20/2022  Authorization Period Expiration: 10/12/2023  Plan of Care Expiration: 12/31/022  Progress Note Due: 11/20/2022  Visit # / Visits authorized: 10/12  FOTO: 11/28/22,     Precautions: Standard      PTA Visit #: 3/5     Time In: 350 pm  Time Out: 430 pm  Total Billable Time: 40 minutes    SUBJECTIVE     Pt reports: she has definitely made improvements, but is still dealing with the pain just on a smaller scale.    She was compliant with home exercise program.  Response to previous treatment: no adverse affect  Functional change: in progress    Pain: 1/10  Location: left back      OBJECTIVE   L lumbar/PSIS TTP    Observation: L PSIS slightly elevated    Posture:  mild forward head and rounded shoulders    Lumbar Range of Motion:    Degrees Pain   Flexion 55*   Pain        Extension 25*   No pain        Left Side Bending 35* No pain        Right Side Bending 30* No pain        Left rotation   50% limited No pain        Right Rotation   25% limited Pain (L lumbar)             Hamstring Flexibility: WNL      Treatment     Nina received the treatments listed below:      therapeutic exercises to develop strength, endurance, ROM, flexibility, posture, and core stabilization for 40 minutes including:    Nustep 6 min   B Piriformis 3x30s   LTR x 15 5s   Racquetball on wall self myofascial release NP  1 min x 3  Open books 15x ea side   B Shuttle hip ext .5 cord 2x10 NP  Bridge  c gait belt 2x10  Objective measurement updated      NOT PERFORMED   Supine exercises on Lumbar heat   DKTC SB x 15 5s (held)  Pallof Press   - 7lbs   - x 20   Pallof Press + Rotation (L rotation produced symptoms - held)  - 3lbs   - x20   SL Clams 3x10 (R)  SL hip abd 3x10 (R)  PPT x 20  TrA activation x 20  Bridges w/ march x 10  Supine clam GTB B 2x10    MANUAL THERAPY TECHNIQUES: Soft tissue Mobilization were applied to L lumbar, SIJ and glute for 00 minutes.  Percussion gun      Functional Dry Needling to L glute med/max with 2 (75mm) needles with mechanical winding and estim to both needles. Patient had good muscular contraction with estim today. Patient had no adverse effects from todays session.       cold pack for 00 minutes to lumbar region.      Patient Education and Home Exercises     Home Exercises Provided and Patient Education Provided     Education provided:   - HEP    Written Home Exercises Provided: yes. Exercises were reviewed and Nina was able to demonstrate them prior to the end of the session.  Nina demonstrated good  understanding of the education provided. See EMR under Patient Instructions for exercises provided during therapy sessions    ASSESSMENT     Patient presents to PT with improvement of symptoms since starting. Session was tolerated well w/o pain. Patient has 2 more authorized visits. She plans to get the MRI on Saturday. She feels that she would like to continue PT. Objective measurements taken with significant improvements since evaluation, pain has centralized to (L) low back (PSIS).     Nina Is progressing well towards her goals.   Pt prognosis is Good.     Pt will continue to benefit from skilled outpatient physical therapy to address the deficits listed in the problem list box on initial evaluation, provide pt/family education and to maximize pt's level of independence in the home and community environment.     Pt's spiritual, cultural and educational needs considered and pt  agreeable to plan of care and goals.     Anticipated barriers to physical therapy: self-limiting behaviors due to pain    Goals: Short Term Goals (3 Weeks):  1. Pt will be compliant with HEP to supplement PT in decreasing pain with functional mobility.  2. Pt will perform pallof press with good control to demonstrate improved core strength.  3. Pt will improve lumbar ROM by 10 degrees in all abnormal planes to improve functional mobility.  4. Pt will improve impaired LE MMTs by 1/2 score to improve strength for functional tasks.  Long Term Goals (6 Weeks):   1. Pt will improve FOTO score to </= 45% limited to decrease perceived limitation with maintaining/changing body position.   2. Pt will perform box lift to overhead with 20lbs with good control to demonstrate improved core strength.  3. Pt will improve impaired LE MMTs by 1 score to improve strength for functional tasks.  4. Pt will report no pain during lumbar ROM to promote functional mobility.     PLAN     Plan of care Certification: 10/20/2022 to 12/31/2022.     Outpatient Physical Therapy 2 times weekly for 6 weeks to include the following interventions: Cervical/Lumbar Traction, Manual Therapy, Moist Heat/ Ice, Neuromuscular Re-ed, Patient Education, Self Care, Therapeutic Activities, Therapeutic Exercise, and FDN.      Upon discharge from further skilled PT intervention it will determined if the need for a work conditioning program or Functional Capacity Evaluation is required to allow the injured worker to return to work with full potential achieved, continued improvement with body mechanics with advanced functional activities, and further prevention of future work-related injuries.        Boston Guerrier, PTA

## 2023-01-03 ENCOUNTER — TELEPHONE (OUTPATIENT)
Dept: URGENT CARE | Facility: CLINIC | Age: 58
End: 2023-01-03
Payer: COMMERCIAL

## 2023-01-03 ENCOUNTER — CLINICAL SUPPORT (OUTPATIENT)
Dept: REHABILITATION | Facility: HOSPITAL | Age: 58
End: 2023-01-03
Payer: OTHER MISCELLANEOUS

## 2023-01-03 ENCOUNTER — OFFICE VISIT (OUTPATIENT)
Dept: URGENT CARE | Facility: CLINIC | Age: 58
End: 2023-01-03
Payer: OTHER MISCELLANEOUS

## 2023-01-03 VITALS
TEMPERATURE: 98 F | SYSTOLIC BLOOD PRESSURE: 90 MMHG | HEIGHT: 67 IN | BODY MASS INDEX: 29.98 KG/M2 | OXYGEN SATURATION: 100 % | WEIGHT: 191 LBS | HEART RATE: 82 BPM | DIASTOLIC BLOOD PRESSURE: 59 MMHG | RESPIRATION RATE: 18 BRPM

## 2023-01-03 DIAGNOSIS — Z02.6 ENCOUNTER RELATED TO WORKER'S COMPENSATION CLAIM: Primary | ICD-10-CM

## 2023-01-03 DIAGNOSIS — S39.012D ACUTE MYOFASCIAL STRAIN OF LUMBAR REGION, SUBSEQUENT ENCOUNTER: ICD-10-CM

## 2023-01-03 DIAGNOSIS — M53.86 DECREASED ROM OF LUMBAR SPINE: Primary | ICD-10-CM

## 2023-01-03 PROCEDURE — 99214 PR OFFICE/OUTPT VISIT, EST, LEVL IV, 30-39 MIN: ICD-10-PCS | Mod: S$GLB,,,

## 2023-01-03 PROCEDURE — 97110 THERAPEUTIC EXERCISES: CPT

## 2023-01-03 PROCEDURE — 99214 OFFICE O/P EST MOD 30 MIN: CPT | Mod: S$GLB,,,

## 2023-01-03 NOTE — PROGRESS NOTES
Subjective:       Patient ID: Nina Cuba is a 57 y.o. female.    Chief Complaint: No chief complaint on file.    Her lower left back pain continues to localize and no longer over a broad area of her lumbar spine.  Physical therapy has been tremendously beneficial especially with dry needling.  Occasionally experiences lower left back pain with some left lower extremity weakness.  Her left leg strength is returning with physical therapy as well.  Unrelated to this work injury, she notes recent episode of hematuria and was diagnosed with kidney stones.     Follow-up of Back Pain ( DOI 08-22-22 ) Working for [employer]  She feels good and she had her MRI  and she a little sore but she feels good .LM    Back Pain  Pertinent negatives include no abdominal pain, bladder incontinence, bowel incontinence or numbness.     Constitution: Negative.   HENT: Negative.     Neck: neck negative.   Cardiovascular: Negative.    Gastrointestinal:  Negative for abdominal pain, nausea, vomiting and bowel incontinence.   Endocrine: negative.   Genitourinary:  Positive for hematuria. Negative for bladder incontinence.   Musculoskeletal:  Positive for pain and back pain. Negative for trauma, joint pain, joint swelling, abnormal ROM of joint and muscle cramps.   Skin: Negative.    Neurological: Negative.  Negative for numbness and tingling.      Objective:      Physical Exam  Vitals reviewed.   Constitutional:       General: She is not in acute distress.  HENT:      Head: Normocephalic.   Eyes:      Conjunctiva/sclera: Conjunctivae normal.   Musculoskeletal:      Cervical back: No pain with movement.      Lumbar back: Tenderness present. No swelling or deformity. Decreased range of motion. Negative right straight leg raise test and negative left straight leg raise test.        Back:       Comments: No gross deformity noted to the lumbar spine.  Mild tenderness on palpation just left lateral to the L5-S1 level.  Pain is same  area with squat activity.  She describes stiffness to this area with extension greater than flexion and with left side bending.  Decreased extension and left side bending motion.  Normal flexion and rotation motion.  Left lower extremity slightly weaker than the right.  Symmetrical lower extremity reflexes bilaterally negative straight leg testing   Skin:     General: Skin is warm.      Capillary Refill: Capillary refill takes less than 2 seconds.   Neurological:      General: No focal deficit present.      Mental Status: She is alert and oriented to person, place, and time.      Gait: Gait is intact.      Deep Tendon Reflexes: Reflexes are normal and symmetric.   Psychiatric:         Attention and Perception: Attention normal.         Mood and Affect: Mood and affect normal.         Speech: Speech normal.         Behavior: Behavior normal. Behavior is cooperative.       Assessment:       1. Encounter related to worker's compensation claim    2. Acute myofascial strain of lumbar region, subsequent encounter        Plan:       Nina has 1 remaining physical therapy session scheduled for today.  This treatment modality has been very beneficial in her recovery.  Therefore, I will order some additional sessions to help with her symptoms.  We are still pending the MRI report from the radiologist but should receive some time today.  I advised her that I will contact her once the report has been received and review the results with her.  Otherwise follow-up in 2 weeks.      Patient Instructions: Continue Physical Therapy   Restrictions: No lifting/pushing/pulling more than 10 lbs  Follow up in about 2 weeks (around 1/17/2023).

## 2023-01-03 NOTE — TELEPHONE ENCOUNTER
Called Nina this afternoon to discuss lumbar MRI results. MRI was completed on 12/30/2022 and compared to previous study on 01/15/2016. She has been aware since childhood about the presence of scoliosis. Multi-level degenerative changes with disc bulging and stenosis noted with increased progression since 2016 imaging. L2-L3 left facet demonstrates inflammation on imaging and left disc herniation present at the L5-S1 level. Clinically, she has less pain, better lower back mobility, and improving left LE strength with physical therapy. Therefore, we will continue with this treatment modality and reassess upon follow-up. She is in agreement with this plan.

## 2023-01-03 NOTE — LETTER
[facility]  [address] [address]  Phone: [phone]  Fax: [phone]  [facility]: [phone]    Pt Name: Nina Cuba  Injury Date: 08/22/2022   Employee ID: [id number] Date of First Treatment: 01/03/2023   Company: [employer]      Appointment Time: 10:00 AM Arrived: 10:21am   Provider: Amilcar Marshall, DO Time Out: 12:00pm     Office Treatment:   1. Encounter related to worker's compensation claim    2. Acute myofascial strain of lumbar region, subsequent encounter          Patient Instructions: Continue Physical Therapy    Restrictions: No lifting/pushing/pulling more than 10 lbs     Return Appointment: 1/17/2023 at 2:00pm

## 2023-01-03 NOTE — PROGRESS NOTES
[facility]   Physical Therapy Treatment Note     Name: Nina Mock Lambiliana  [facility] Number: [id number]    Therapy Diagnosis:   Encounter Diagnosis   Name Primary?    Decreased ROM of lumbar spine Yes           Physician: Boston Cuba PA-C    Visit Date: 1/3/2023  Physician: Boston Cuba PA-C     Physician Orders: PT Eval and Treat   Medical Diagnosis from Referral:   S39.012A (ICD-10-CM) - Acute myofascial strain of lumbar region, initial encounter   Y99.0 (ICD-10-CM) - Work related injury     Evaluation Date: 10/20/2022  Authorization Period Expiration: 10/12/2023  Plan of Care Expiration: 12/31/022  Progress Note Due: 11/20/2022  Visit # / Visits authorized: 11/12  FOTO: 11/28/22,     Precautions: Standard      PTA Visit #: 0/5     Time In: 1205  Time Out: 1256  Total Billable Time: 51 minutes    SUBJECTIVE     Pt reports: She continues to feel much better with therapy and is able to do a lot more functionally. Patient feels more confident in her leg strength and back. Patient reports she wants more confidence in lifting      She was compliant with home exercise program.  Response to previous treatment: no adverse affect  Functional change: in progress    Pain: 1/10  Location: left back      OBJECTIVE       Treatment     Nina received the treatments listed below:      therapeutic exercises to develop strength, endurance, ROM, flexibility, posture, and core stabilization for 51 minutes including:    Nustep 6 min   Touch and Go  - to fatigue x 3  Lateral walking BTB  - to fatigue x 3  Touch and Go   - 10lb  - to fatigue x 3  Iso Squat with overhead press  - 6lbs   - 3x15      THERAPEUTIC ACTIVITIES to improve dynamic and functional performance for her job minutes including .            MANUAL THERAPY TECHNIQUES: Soft tissue Mobilization were applied to L lumbar, SIJ and glute for 00 minutes.  Percussion gun      Functional Dry Needling to L glute med/max with 2 (75mm) needles with  mechanical winding and estim to both needles. Patient had good muscular contraction with estim today. Patient had no adverse effects from todays session.       cold pack for 00 minutes to lumbar region.      Patient Education and Home Exercises     Home Exercises Provided and Patient Education Provided     Education provided:   - HEP    Written Home Exercises Provided: yes. Exercises were reviewed and Nina was able to demonstrate them prior to the end of the session.  Nina demonstrated good  understanding of the education provided. See EMR under Patient Instructions for exercises provided during therapy sessions    ASSESSMENT     Patient presents to PT with continued improvement of symptoms since starting. Patient was progressed today with functional tasks today related to her work with lifting and reaching over head. Patient had minimal to no back pain with todays session. Cont to progress as tolerated with functional exercises.        Nina Is progressing well towards her goals.   Pt prognosis is Good.     Pt will continue to benefit from skilled outpatient physical therapy to address the deficits listed in the problem list box on initial evaluation, provide pt/family education and to maximize pt's level of independence in the home and community environment.     Pt's spiritual, cultural and educational needs considered and pt agreeable to plan of care and goals.     Anticipated barriers to physical therapy: self-limiting behaviors due to pain    Goals: Short Term Goals (3 Weeks):  1. Pt will be compliant with HEP to supplement PT in decreasing pain with functional mobility.  2. Pt will perform pallof press with good control to demonstrate improved core strength.  3. Pt will improve lumbar ROM by 10 degrees in all abnormal planes to improve functional mobility.  4. Pt will improve impaired LE MMTs by 1/2 score to improve strength for functional tasks.  Long Term Goals (6 Weeks):   1. Pt will improve FOTO score to  </= 45% limited to decrease perceived limitation with maintaining/changing body position.   2. Pt will perform box lift to overhead with 20lbs with good control to demonstrate improved core strength.  3. Pt will improve impaired LE MMTs by 1 score to improve strength for functional tasks.  4. Pt will report no pain during lumbar ROM to promote functional mobility.     PLAN     Plan of care Certification: 10/20/2022 to 12/31/2022.     Outpatient Physical Therapy 2 times weekly for 6 weeks to include the following interventions: Cervical/Lumbar Traction, Manual Therapy, Moist Heat/ Ice, Neuromuscular Re-ed, Patient Education, Self Care, Therapeutic Activities, Therapeutic Exercise, and FDN.      Upon discharge from further skilled PT intervention it will determined if the need for a work conditioning program or Functional Capacity Evaluation is required to allow the injured worker to return to work with full potential achieved, continued improvement with body mechanics with advanced functional activities, and further prevention of future work-related injuries.        Tin Irene, PT

## 2023-01-06 ENCOUNTER — LAB VISIT (OUTPATIENT)
Dept: LAB | Facility: HOSPITAL | Age: 58
End: 2023-01-06
Attending: INTERNAL MEDICINE
Payer: COMMERCIAL

## 2023-01-06 DIAGNOSIS — R31.9 HEMATURIA, UNSPECIFIED TYPE: ICD-10-CM

## 2023-01-06 LAB
BACTERIA #/AREA URNS HPF: NORMAL /HPF
BILIRUB UR QL STRIP: NEGATIVE
CLARITY UR: ABNORMAL
COLOR UR: YELLOW
GLUCOSE UR QL STRIP: NEGATIVE
HGB UR QL STRIP: ABNORMAL
HYALINE CASTS #/AREA URNS LPF: 1 /LPF
KETONES UR QL STRIP: ABNORMAL
LEUKOCYTE ESTERASE UR QL STRIP: NEGATIVE
MICROSCOPIC COMMENT: NORMAL
NITRITE UR QL STRIP: NEGATIVE
PH UR STRIP: 6 [PH] (ref 5–8)
PROT UR QL STRIP: ABNORMAL
RBC #/AREA URNS HPF: 2 /HPF (ref 0–4)
SP GR UR STRIP: >1.03 (ref 1–1.03)
SQUAMOUS #/AREA URNS HPF: 10 /HPF
URN SPEC COLLECT METH UR: ABNORMAL
UROBILINOGEN UR STRIP-ACNC: NEGATIVE EU/DL
WBC #/AREA URNS HPF: 2 /HPF (ref 0–5)

## 2023-01-06 PROCEDURE — 87086 URINE CULTURE/COLONY COUNT: CPT | Performed by: INTERNAL MEDICINE

## 2023-01-06 PROCEDURE — 81000 URINALYSIS NONAUTO W/SCOPE: CPT | Performed by: INTERNAL MEDICINE

## 2023-01-08 LAB — BACTERIA UR CULT: NORMAL

## 2023-01-09 ENCOUNTER — TELEPHONE (OUTPATIENT)
Dept: INTERNAL MEDICINE | Facility: CLINIC | Age: 58
End: 2023-01-09
Payer: COMMERCIAL

## 2023-01-09 NOTE — TELEPHONE ENCOUNTER
----- Message from Tomy Jackson DO sent at 1/9/2023 11:27 AM CST -----  Repeat urine testing is neg for blood/infection

## 2023-01-11 ENCOUNTER — TELEPHONE (OUTPATIENT)
Dept: INTERNAL MEDICINE | Facility: CLINIC | Age: 58
End: 2023-01-11
Payer: COMMERCIAL

## 2023-01-11 NOTE — TELEPHONE ENCOUNTER
----- Message from Caitlin Scott sent at 1/11/2023  4:12 PM CST -----  Contact: [phone]  Pt would like to know if Dr Jackson still wants her to see urology, pls advise

## 2023-01-13 ENCOUNTER — DOCUMENTATION ONLY (OUTPATIENT)
Dept: REHABILITATION | Facility: HOSPITAL | Age: 58
End: 2023-01-13
Payer: COMMERCIAL

## 2023-01-13 NOTE — PROGRESS NOTES
Patient called our clinic and informed us she is not feeling well today and cannot make todays appointment. Patient will be in attendance next session

## 2023-01-17 ENCOUNTER — OFFICE VISIT (OUTPATIENT)
Dept: URGENT CARE | Facility: CLINIC | Age: 58
End: 2023-01-17
Payer: OTHER MISCELLANEOUS

## 2023-01-17 ENCOUNTER — DOCUMENTATION ONLY (OUTPATIENT)
Dept: REHABILITATION | Facility: HOSPITAL | Age: 58
End: 2023-01-17
Payer: COMMERCIAL

## 2023-01-17 VITALS
WEIGHT: 191 LBS | HEIGHT: 67 IN | OXYGEN SATURATION: 100 % | BODY MASS INDEX: 29.98 KG/M2 | TEMPERATURE: 98 F | SYSTOLIC BLOOD PRESSURE: 123 MMHG | HEART RATE: 68 BPM | DIASTOLIC BLOOD PRESSURE: 83 MMHG

## 2023-01-17 DIAGNOSIS — Z02.6 ENCOUNTER RELATED TO WORKER'S COMPENSATION CLAIM: ICD-10-CM

## 2023-01-17 DIAGNOSIS — S39.012D ACUTE MYOFASCIAL STRAIN OF LUMBAR REGION, SUBSEQUENT ENCOUNTER: Primary | ICD-10-CM

## 2023-01-17 PROCEDURE — 99213 PR OFFICE/OUTPT VISIT, EST, LEVL III, 20-29 MIN: ICD-10-PCS | Mod: S$GLB,,, | Performed by: NURSE PRACTITIONER

## 2023-01-17 PROCEDURE — 99213 OFFICE O/P EST LOW 20 MIN: CPT | Mod: S$GLB,,, | Performed by: NURSE PRACTITIONER

## 2023-01-17 NOTE — PROGRESS NOTES
Subjective:       Patient ID: Nina Cuba is a 57 y.o. female.    Chief Complaint: Back Pain    WC, RV ( DOI 08-22-22 ) The patient works for [employer]. She feels the back is getting better. She only takes the IBP RX. She reports back spasms of standing for long periods of time. She has not been lifting more than 10 lbs. She received approval for another round of PT and will start on 1-18-23. She also wants to discuss the MRI results. She had it done on 12-30-22. Current pain score 6/10. LRC       She is scheduled to return to PT tomorrow. Says PT has helped her a lot, especially the dry needling. She is also scheduled to RTW soon since she took some time off to recover from the injury. Takes OTC Ibuprofen prn. MWT    Constitution: Negative.   HENT: Negative.     Neck: neck negative.   Cardiovascular: Negative.    Eyes: Negative.    Respiratory: Negative.     Gastrointestinal:  Negative for abdominal pain, nausea, vomiting and bowel incontinence.   Endocrine: negative.   Genitourinary:  Negative for bladder incontinence.   Musculoskeletal:  Positive for pain and back pain.   Skin: Negative.  Negative for erythema and bruising.   Allergic/Immunologic: Negative.    Neurological: Negative.  Negative for numbness and tingling.      Objective:      Physical Exam  Constitutional:       Appearance: Normal appearance.   HENT:      Right Ear: External ear normal.      Left Ear: External ear normal.   Eyes:      Conjunctiva/sclera: Conjunctivae normal.   Cardiovascular:      Pulses: Normal pulses.   Pulmonary:      Effort: Pulmonary effort is normal.   Abdominal:      General: Abdomen is flat.   Musculoskeletal:      Lumbar back: Tenderness present. No swelling. Normal range of motion. Negative right straight leg raise test and negative left straight leg raise test.        Back:       Comments: Non radiating pain to L lower back. Pain exacerbated by bilateral twisting. Neg bilateral SLRs. Heel and toe walks  well. ROM is improving. Overall is improving.   Skin:     General: Skin is warm and dry.      Findings: No erythema.   Neurological:      General: No focal deficit present.      Mental Status: She is alert and oriented to person, place, and time.   Psychiatric:         Mood and Affect: Mood normal.         Behavior: Behavior normal.         Thought Content: Thought content normal.         Judgment: Judgment normal.       Assessment:       1. Acute myofascial strain of lumbar region, subsequent encounter    2. Encounter related to worker's compensation claim          Plan:     I have reviewed the MRI report and Dr. Marshall's notes.    Nina will resume PT tomorrow (the 2nd round of sessions) and will return to LD later this week. She has OTC NSAIDS or Tylenol to take as needed. Will re-check in a few weeks after PT has resumed.     Patient Instructions: Attention not to aggravate affected area, Daily home exercises/warm soaks, Continue Physical Therapy   Restrictions: No lifting/pushing/pulling more than 10 lbs  Follow up in about 3 weeks (around 2/7/2023).

## 2023-01-17 NOTE — LETTER
[facility]  [address] [address]  Phone: [phone]  Fax: [phone]  [facility]: [phone]     Name: Nina Cuba  Injury Date: 08/22/2022   Employee ID: [id number] Date of Treatment: 01/17/2023   Company: [employer]      Appointment Time: 02:00 PM Arrived: 1:57 PM   Provider: Perla Sky NP Time Out: 3:35 PM      Office Treatment:   1. Acute myofascial strain of lumbar region, subsequent encounter    2. Encounter related to worker's compensation claim          Patient Instructions: Attention not to aggravate affected area, Daily home exercises/warm soaks, Continue Physical Therapy      Restrictions: No lifting/pushing/pulling more than 10 lbs     Return Appointment: 2/7/2023 at 9:45 AM JONAHTAN

## 2023-01-18 ENCOUNTER — CLINICAL SUPPORT (OUTPATIENT)
Dept: REHABILITATION | Facility: HOSPITAL | Age: 58
End: 2023-01-18
Payer: OTHER MISCELLANEOUS

## 2023-01-18 DIAGNOSIS — M53.86 DECREASED ROM OF LUMBAR SPINE: Primary | ICD-10-CM

## 2023-01-18 PROCEDURE — 97110 THERAPEUTIC EXERCISES: CPT

## 2023-01-18 NOTE — PROGRESS NOTES
[facility]   Physical Therapy Treatment Note     Name: Nina Mock Lambiliana  Clinic Number: [id number]    Therapy Diagnosis:   Encounter Diagnosis   Name Primary?    Decreased ROM of lumbar spine Yes             Physician: Amilcar Marshall DO    Visit Date: 1/18/2023  Physician: Boston Cuba PA-C     Physician Orders: PT Eval and Treat   Medical Diagnosis from Referral:   S39.012A (ICD-10-CM) - Acute myofascial strain of lumbar region, initial encounter   Y99.0 (ICD-10-CM) - Work related injury     Evaluation Date: 10/20/2022  Authorization Period Expiration: 10/12/2023  Plan of Care Expiration: 12/31/022  Progress Note Due: 11/20/2022  Visit # / Visits authorized: 12/12  FOTO: 11/28/22,     Precautions: Standard      PTA Visit #: 0/5     Time In: 145   Time Out: 230  Total Billable Time: 45 minutes    SUBJECTIVE     Pt reports: she is feeling better, but still has pain when standing in one spot for a prolonged period. Once she changes position and stretches she feels better.       She was compliant with home exercise program.  Response to previous treatment: no adverse affect  Functional change: in progress    Pain: 1/10  Location: left back      OBJECTIVE       Treatment     Nina received the treatments listed below:      therapeutic exercises to develop strength, endurance, ROM, flexibility, posture, and core stabilization for 45 minutes including:    Nustep 6 min   Farmer carry   - 10lbs 4 laps each arm   - 25lbs 4 laps ea arm  Touch and Go  - 25lb 3x10  Lateral walking BTB  - to fatigue x 3  Step ups lvl 3 w/ overhead press 7lbs   - 2x10  Clams Blue TB   - 3x10   Bridging 3x10 Blue TB   Shuttle       THERAPEUTIC ACTIVITIES to improve dynamic and functional performance for her job minutes including .            MANUAL THERAPY TECHNIQUES: Soft tissue Mobilization were applied to L lumbar, SIJ and glute for 00 minutes.  Percussion gun      Functional Dry Needling to L glute med/max  with 2 (75mm) needles with mechanical winding and estim to both needles. Patient had good muscular contraction with estim today. Patient had no adverse effects from todays session.       cold pack for 00 minutes to lumbar region.      Patient Education and Home Exercises     Home Exercises Provided and Patient Education Provided     Education provided:   - HEP    Written Home Exercises Provided: yes. Exercises were reviewed and Nina was able to demonstrate them prior to the end of the session.  Nina demonstrated good  understanding of the education provided. See EMR under Patient Instructions for exercises provided during therapy sessions    ASSESSMENT      Patient presents to PT with continued improvement of symptoms since starting. Patient was progressed today with functional tasks today related to her work with lifting and reaching over head. Patient had minimal to no back pain with todays session. Cont to progress as tolerated with functional exercises. Cont to progress strengthening        Nina Is progressing well towards her goals.   Pt prognosis is Good.     Pt will continue to benefit from skilled outpatient physical therapy to address the deficits listed in the problem list box on initial evaluation, provide pt/family education and to maximize pt's level of independence in the home and community environment.     Pt's spiritual, cultural and educational needs considered and pt agreeable to plan of care and goals.     Anticipated barriers to physical therapy: self-limiting behaviors due to pain    Goals: Short Term Goals (3 Weeks):  1. Pt will be compliant with HEP to supplement PT in decreasing pain with functional mobility.  2. Pt will perform pallof press with good control to demonstrate improved core strength.  3. Pt will improve lumbar ROM by 10 degrees in all abnormal planes to improve functional mobility.  4. Pt will improve impaired LE MMTs by 1/2 score to improve strength for functional tasks.  Long  Term Goals (6 Weeks):   1. Pt will improve FOTO score to </= 45% limited to decrease perceived limitation with maintaining/changing body position.   2. Pt will perform box lift to overhead with 20lbs with good control to demonstrate improved core strength.  3. Pt will improve impaired LE MMTs by 1 score to improve strength for functional tasks.  4. Pt will report no pain during lumbar ROM to promote functional mobility.     PLAN     Plan of care Certification: 10/20/2022 to 12/31/2022.     Outpatient Physical Therapy 2 times weekly for 6 weeks to include the following interventions: Cervical/Lumbar Traction, Manual Therapy, Moist Heat/ Ice, Neuromuscular Re-ed, Patient Education, Self Care, Therapeutic Activities, Therapeutic Exercise, and FDN.      Upon discharge from further skilled PT intervention it will determined if the need for a work conditioning program or Functional Capacity Evaluation is required to allow the injured worker to return to work with full potential achieved, continued improvement with body mechanics with advanced functional activities, and further prevention of future work-related injuries.        Tin Irene, PT

## 2023-01-23 ENCOUNTER — CLINICAL SUPPORT (OUTPATIENT)
Dept: REHABILITATION | Facility: HOSPITAL | Age: 58
End: 2023-01-23
Payer: OTHER MISCELLANEOUS

## 2023-01-23 DIAGNOSIS — M53.86 DECREASED ROM OF LUMBAR SPINE: Primary | ICD-10-CM

## 2023-01-23 PROCEDURE — 97110 THERAPEUTIC EXERCISES: CPT

## 2023-01-23 NOTE — PROGRESS NOTES
[facility]   Physical Therapy Treatment Note     Name: Nina Mock Lambiliana  Clinic Number: [id number]    Therapy Diagnosis:   Encounter Diagnosis   Name Primary?    Decreased ROM of lumbar spine Yes       Physician: Amilcar Marshall DO    Visit Date: 1/23/2023  Physician: Boston Cuba PA-C     Physician Orders: PT Eval and Treat   Medical Diagnosis from Referral:   S39.012A (ICD-10-CM) - Acute myofascial strain of lumbar region, initial encounter   Y99.0 (ICD-10-CM) - Work related injury     Evaluation Date: 10/20/2022  Authorization Period Expiration: 10/12/2023  Plan of Care Expiration: 12/31/022  Progress Note Due: 11/20/2022  Visit # / Visits authorized: 12/12, 2/9  FOTO: 11/28/22,     Precautions: Standard      PTA Visit #: 0/5     Time In: 315  Time Out: 400  Total Billable Time: 45 minutes    SUBJECTIVE     Pt reports: she continues to feel better. Patient reports she does not feel limited with anything as of today.       She was compliant with home exercise program.  Response to previous treatment: no adverse affect  Functional change: in progress    Pain: 1/10  Location: left back      OBJECTIVE       Treatment     Nina received the treatments listed below:      therapeutic exercises to develop strength, endurance, ROM, flexibility, posture, and core stabilization for 45 minutes including:    Nustep 6 min   Touch and Go  - 0# 2x10  - 15# 2x10   - 25lb 2x10  Lateral walking BTB  - to fatigue x 3  PPT with biofeedback   - leg kicks  - 2x10   DKTC   - 2x10   Clams Blue TB   - 3x10   Shuttle leg press   - double 4 cord  - 3x10       NOT PERFORMED   Guillermo carry   - 10lbs 4 laps each arm   - 25lbs 4 laps ea arm  Step ups lvl 3 w/ overhead press 7lbs   - 2x10  Bridging 3x10 Blue TB         MANUAL THERAPY TECHNIQUES: Soft tissue Mobilization were applied to L lumbar, SIJ and glute for 00 minutes.  Percussion gun      Functional Dry Needling to L glute med/max with 2 (75mm) needles  with mechanical winding and estim to both needles. Patient had good muscular contraction with estim today. Patient had no adverse effects from todays session.       cold pack for 00 minutes to lumbar region.      Patient Education and Home Exercises     Home Exercises Provided and Patient Education Provided     Education provided:   - HEP    Written Home Exercises Provided: yes. Exercises were reviewed and Nina was able to demonstrate them prior to the end of the session.  Nina demonstrated good  understanding of the education provided. See EMR under Patient Instructions for exercises provided during therapy sessions    ASSESSMENT     Patient presents to PT with continued improvement of symptoms since starting. Patient was progressed today with core and functional tasks today related to her work with lifting. Patient had minimal to no back pain with todays session. Cont to progress as tolerated with functional exercises. Cont to progress strengthening        Nina Is progressing well towards her goals.   Pt prognosis is Good.     Pt will continue to benefit from skilled outpatient physical therapy to address the deficits listed in the problem list box on initial evaluation, provide pt/family education and to maximize pt's level of independence in the home and community environment.     Pt's spiritual, cultural and educational needs considered and pt agreeable to plan of care and goals.     Anticipated barriers to physical therapy: self-limiting behaviors due to pain    Goals: Short Term Goals (3 Weeks):  1. Pt will be compliant with HEP to supplement PT in decreasing pain with functional mobility.  2. Pt will perform pallof press with good control to demonstrate improved core strength.  3. Pt will improve lumbar ROM by 10 degrees in all abnormal planes to improve functional mobility.  4. Pt will improve impaired LE MMTs by 1/2 score to improve strength for functional tasks.  Long Term Goals (6 Weeks):   1. Pt will  improve FOTO score to </= 45% limited to decrease perceived limitation with maintaining/changing body position.   2. Pt will perform box lift to overhead with 20lbs with good control to demonstrate improved core strength.  3. Pt will improve impaired LE MMTs by 1 score to improve strength for functional tasks.  4. Pt will report no pain during lumbar ROM to promote functional mobility.     PLAN     Plan of care Certification: 10/20/2022 to 12/31/2022.     Outpatient Physical Therapy 2 times weekly for 6 weeks to include the following interventions: Cervical/Lumbar Traction, Manual Therapy, Moist Heat/ Ice, Neuromuscular Re-ed, Patient Education, Self Care, Therapeutic Activities, Therapeutic Exercise, and FDN.      Upon discharge from further skilled PT intervention it will determined if the need for a work conditioning program or Functional Capacity Evaluation is required to allow the injured worker to return to work with full potential achieved, continued improvement with body mechanics with advanced functional activities, and further prevention of future work-related injuries.        Tin Irene, PT

## 2023-01-25 ENCOUNTER — CLINICAL SUPPORT (OUTPATIENT)
Dept: REHABILITATION | Facility: HOSPITAL | Age: 58
End: 2023-01-25
Payer: OTHER MISCELLANEOUS

## 2023-01-25 DIAGNOSIS — M53.86 DECREASED ROM OF LUMBAR SPINE: Primary | ICD-10-CM

## 2023-01-25 PROCEDURE — 97110 THERAPEUTIC EXERCISES: CPT

## 2023-01-25 NOTE — PROGRESS NOTES
[facility]   Physical Therapy Treatment Note     Name: Nina Mock Lambiliana  Clinic Number: [id number]    Therapy Diagnosis:   Encounter Diagnosis   Name Primary?    Decreased ROM of lumbar spine Yes       Physician: Amilcar Marshall DO    Visit Date: 1/25/2023  Physician: Boston Cuba PA-C     Physician Orders: PT Eval and Treat   Medical Diagnosis from Referral:   S39.012A (ICD-10-CM) - Acute myofascial strain of lumbar region, initial encounter   Y99.0 (ICD-10-CM) - Work related injury     Evaluation Date: 10/20/2022  Authorization Period Expiration: 10/12/2023  Plan of Care Expiration: 12/31/022  Progress Note Due: 11/20/2022  Visit # / Visits authorized: 12/12, 2/9  FOTO: 11/28/22,     Precautions: Standard      PTA Visit #: 0/5     Time In: 10:15  Time Out: 11:00  Total Billable Time: 45 minutes    SUBJECTIVE     Pt reports: she continues to feel better. Patient reports she does not feel limited with anything as of today.       She was compliant with home exercise program.  Response to previous treatment: no adverse affect  Functional change: in progress    Pain: 1/10  Location: left back      OBJECTIVE       Treatment     Nina received the treatments listed below:      therapeutic exercises to develop strength, endurance, ROM, flexibility, posture, and core stabilization for 45 minutes including:    Nustep 6 min   Touch and Go  - 0# 2x10  - 15# 2x10   - 25lb 2x10  Lateral walking BTB  - to fatigue x 3  PPT with biofeedback   - leg kicks  - 2x10   DKTC   - 2x10   Clams Blue TB   - 3x10   Shuttle leg press   - double 4 cord  - 3x10       NOT PERFORMED   Guillermo carry   - 10lbs 4 laps each arm   - 25lbs 4 laps ea arm  Step ups lvl 3 w/ overhead press 7lbs   - 2x10  Bridging 3x10 Blue TB         MANUAL THERAPY TECHNIQUES: Soft tissue Mobilization were applied to L lumbar, SIJ and glute for 00 minutes.  Percussion gun      Functional Dry Needling to L glute med/max with 2 (75mm) needles  with mechanical winding and estim to both needles. Patient had good muscular contraction with estim today. Patient had no adverse effects from todays session.       cold pack for 00 minutes to lumbar region.      Patient Education and Home Exercises     Home Exercises Provided and Patient Education Provided     Education provided:   - HEP    Written Home Exercises Provided: yes. Exercises were reviewed and Nina was able to demonstrate them prior to the end of the session.  Nina demonstrated good  understanding of the education provided. See EMR under Patient Instructions for exercises provided during therapy sessions    ASSESSMENT     Patient presents to PT with continued improvement of symptoms since starting. Patient was progressed today with core and functional tasks today related to her work with lifting. Patient had minimal to no back pain with todays session. Cont to progress as tolerated with functional exercises. Cont to progress strengthening        Nina Is progressing well towards her goals.   Pt prognosis is Good.     Pt will continue to benefit from skilled outpatient physical therapy to address the deficits listed in the problem list box on initial evaluation, provide pt/family education and to maximize pt's level of independence in the home and community environment.     Pt's spiritual, cultural and educational needs considered and pt agreeable to plan of care and goals.     Anticipated barriers to physical therapy: self-limiting behaviors due to pain    Goals: Short Term Goals (3 Weeks):  1. Pt will be compliant with HEP to supplement PT in decreasing pain with functional mobility.  2. Pt will perform pallof press with good control to demonstrate improved core strength.  3. Pt will improve lumbar ROM by 10 degrees in all abnormal planes to improve functional mobility.  4. Pt will improve impaired LE MMTs by 1/2 score to improve strength for functional tasks.  Long Term Goals (6 Weeks):   1. Pt will  improve FOTO score to </= 45% limited to decrease perceived limitation with maintaining/changing body position.   2. Pt will perform box lift to overhead with 20lbs with good control to demonstrate improved core strength.  3. Pt will improve impaired LE MMTs by 1 score to improve strength for functional tasks.  4. Pt will report no pain during lumbar ROM to promote functional mobility.     PLAN     Plan of care Certification: 10/20/2022 to 12/31/2022.     Outpatient Physical Therapy 2 times weekly for 6 weeks to include the following interventions: Cervical/Lumbar Traction, Manual Therapy, Moist Heat/ Ice, Neuromuscular Re-ed, Patient Education, Self Care, Therapeutic Activities, Therapeutic Exercise, and FDN.      Upon discharge from further skilled PT intervention it will determined if the need for a work conditioning program or Functional Capacity Evaluation is required to allow the injured worker to return to work with full potential achieved, continued improvement with body mechanics with advanced functional activities, and further prevention of future work-related injuries.        Boston Billings, PT

## 2023-01-30 ENCOUNTER — CLINICAL SUPPORT (OUTPATIENT)
Dept: REHABILITATION | Facility: HOSPITAL | Age: 58
End: 2023-01-30
Payer: OTHER MISCELLANEOUS

## 2023-01-30 DIAGNOSIS — M53.86 DECREASED ROM OF LUMBAR SPINE: Primary | ICD-10-CM

## 2023-01-30 PROCEDURE — 97530 THERAPEUTIC ACTIVITIES: CPT

## 2023-01-30 PROCEDURE — 97110 THERAPEUTIC EXERCISES: CPT

## 2023-01-30 NOTE — PROGRESS NOTES
[facility]   Physical Therapy Treatment Note / Plan of Care Update     Name: Nina Cuba  Clinic Number: [id number]    Therapy Diagnosis:   Encounter Diagnosis   Name Primary?    Decreased ROM of lumbar spine Yes         Physician: Amilcar Marshall DO    Visit Date: 1/30/2023  Physician: Boston Cuba PA-C     Physician Orders: PT Eval and Treat   Medical Diagnosis from Referral:   S39.012A (ICD-10-CM) - Acute myofascial strain of lumbar region, initial encounter   Y99.0 (ICD-10-CM) - Work related injury     Evaluation Date: 10/20/2022  Authorization Period Expiration: 10/12/2023  Plan of Care Expiration: 4/24/2023  Visit # / Visits authorized: 12/12, 2/9  FOTO: 11/28/22,     Precautions: Standard      PTA Visit #: 0/5     Time In: 315  Time Out: 405  Total Billable Time: 50 minutes    SUBJECTIVE     Pt reports: she has not had any pain since she saw me last session. Patient is able to lift heavier at work with little to no pain. Patient feels like she is 75% of normal. Patient reports her strength is her main complaint that she wants to work on    She was compliant with home exercise program.  Response to previous treatment: no adverse affect  Functional change: in progress    Pain: 0/10  Location: left back      OBJECTIVE     Lumbar ROM: (measured in degrees)    Degrees Quality   Flexion 100 deg        Extension 30 deg        Left Side Bending 30 deg      Right Side Bending 30 deg    Left rotation 0% limited    Right Rotation 0% limited        Active/Passive Hip ROM: (measured in degrees)     RLE LLE   Flexion WNL WNL   Abduction WNL WNL   Extension WNL WNL   ER WNL WNL   IR WNL WNL            Lower Extremity Strength (graded 0-5 out of 5)    RLE LLE   Hip flexion: 4+/5 4+/5   Knee extension: 4+/5 4+/5   Ankle dorsiflexion: 4+/5 4+/5   Posterior fibers of Gluteus medius 4+/5 4+/5   Knee flexion 4+/5 4+/5   Glute max 4+/5 4+/5   Ankle plantarflexion 4+/5 4+/5      Special Tests:  ((+): pos.; (-): neg.)   Quadrant test:  -  Slump Test: -   SLR Test: -   Bridge Test: -   Pirformis Test: -       Functional Job Specific Testing:      Job Specific Task Job Demands Current Ability Deficit? (Yes or No)   Lifting objects to chest Stocking shelves Able  no   2. Lifting objects over head  Stocking shelves Able  no   3. Walking Walking  Able None       Treatment     Nina received the treatments listed below:      therapeutic exercises to develop strength, endurance, ROM, flexibility, posture, and core stabilization for 25 minutes including:    Nustep 6 min   Shuttle leg press   - double 3 cord  - 3x10   - double 4.5 cord  - 3x10   Pallof Press   - 2x10 ea way   - 7lbs Cables   - Chops 7lbs x20       NOT PERFORMED   Lateral walking BTB  - to fatigue x 3  PPT with biofeedback   - leg kicks  - 2x10   DKTC   - 2x10   Clams Blue TB   - 3x10     THERAPEUTIC ACTIVITIES to improve dynamic and functional performance for bending, lifting, twisting, and carrying for 25 minutes including:      Touch and Go  - 15# 3x10   Guillermo carry   - 25lbs 4 laps ea arm  Single arm over head press on cables w/ PPT  - 3x10  - B UE   Bridging 3x10 Blue TB         MANUAL THERAPY TECHNIQUES: Soft tissue Mobilization were applied to L lumbar, SIJ and glute for 00 minutes.  Percussion gun      Functional Dry Needling to L glute med/max with 2 (75mm) needles with mechanical winding and estim to both needles. Patient had good muscular contraction with estim today. Patient had no adverse effects from todays session.       cold pack for 00 minutes to lumbar region.      Patient Education and Home Exercises     Home Exercises Provided and Patient Education Provided     Education provided:   - HEP    Written Home Exercises Provided: yes. Exercises were reviewed and Nina was able to demonstrate them prior to the end of the session.  Nina demonstrated good  understanding of the education provided. See EMR under Patient Instructions for  exercises provided during therapy sessions    ASSESSMENT     Patient was reassessed today and has shown marked improvement with lumbar range of motion and lower extremity strength and function. Patient continues to be challenged with therapeutic activities to return to prior level of functioning. Patient is progressing well and should be getting close to discharge. Cont to progress as tolerated with functional exercises. Cont to progress strengthening        Nina Is progressing well towards her goals.   Pt prognosis is Good.     Pt will continue to benefit from skilled outpatient physical therapy to address the deficits listed in the problem list box on initial evaluation, provide pt/family education and to maximize pt's level of independence in the home and community environment.     Pt's spiritual, cultural and educational needs considered and pt agreeable to plan of care and goals.     Anticipated barriers to physical therapy: self-limiting behaviors due to pain    Goals: Short Term Goals (3 Weeks):  1. Pt will be compliant with HEP to supplement PT in decreasing pain with functional mobility.  2. Pt will perform pallof press with good control to demonstrate improved core strength.  3. Pt will improve lumbar ROM by 10 degrees in all abnormal planes to improve functional mobility.  4. Pt will improve impaired LE MMTs by 1/2 score to improve strength for functional tasks.  Long Term Goals (6 Weeks):   1. Pt will improve FOTO score to </= 45% limited to decrease perceived limitation with maintaining/changing body position.   2. Pt will perform box lift to overhead with 20lbs with good control to demonstrate improved core strength.  3. Pt will improve impaired LE MMTs by 1 score to improve strength for functional tasks.  4. Pt will report no pain during lumbar ROM to promote functional mobility.     PLAN     Plan of care Certification: 4/24/2023     Outpatient Physical Therapy 2 times weekly for 6 weeks to include  the following interventions: Cervical/Lumbar Traction, Manual Therapy, Moist Heat/ Ice, Neuromuscular Re-ed, Patient Education, Self Care, Therapeutic Activities, Therapeutic Exercise, and FDN.      Upon discharge from further skilled PT intervention it will determined if the need for a work conditioning program or Functional Capacity Evaluation is required to allow the injured worker to return to work with full potential achieved, continued improvement with body mechanics with advanced functional activities, and further prevention of future work-related injuries.        Tin Irene, PT

## 2023-01-30 NOTE — PLAN OF CARE
[facility]   Physical Therapy Treatment Note / Plan of Care Update     Name: Nina Cuba  Clinic Number: [id number]    Therapy Diagnosis:   Encounter Diagnosis   Name Primary?    Decreased ROM of lumbar spine Yes         Physician: Amilcar Marshall DO    Visit Date: 1/30/2023  Physician: Boston Cuba PA-C     Physician Orders: PT Eval and Treat   Medical Diagnosis from Referral:   S39.012A (ICD-10-CM) - Acute myofascial strain of lumbar region, initial encounter   Y99.0 (ICD-10-CM) - Work related injury     Evaluation Date: 10/20/2022  Authorization Period Expiration: 10/12/2023  Plan of Care Expiration: 4/24/2023  Visit # / Visits authorized: 12/12, 2/9  FOTO: 11/28/22,     Precautions: Standard      PTA Visit #: 0/5     Time In: 315  Time Out: 405  Total Billable Time: 50 minutes    SUBJECTIVE     Pt reports: she has not had any pain since she saw me last session. Patient is able to lift heavier at work with little to no pain. Patient feels like she is 75% of normal. Patient reports her strength is her main complaint that she wants to work on    She was compliant with home exercise program.  Response to previous treatment: no adverse affect  Functional change: in progress    Pain: 0/10  Location: left back      OBJECTIVE     Lumbar ROM: (measured in degrees)    Degrees Quality   Flexion 100 deg        Extension 30 deg        Left Side Bending 30 deg      Right Side Bending 30 deg    Left rotation 0% limited    Right Rotation 0% limited        Active/Passive Hip ROM: (measured in degrees)     RLE LLE   Flexion WNL WNL   Abduction WNL WNL   Extension WNL WNL   ER WNL WNL   IR WNL WNL            Lower Extremity Strength (graded 0-5 out of 5)    RLE LLE   Hip flexion: 4+/5 4+/5   Knee extension: 4+/5 4+/5   Ankle dorsiflexion: 4+/5 4+/5   Posterior fibers of Gluteus medius 4+/5 4+/5   Knee flexion 4+/5 4+/5   Glute max 4+/5 4+/5   Ankle plantarflexion 4+/5 4+/5      Special Tests:  ((+): pos.; (-): neg.)   Quadrant test:  -  Slump Test: -   SLR Test: -   Bridge Test: -   Pirformis Test: -       Functional Job Specific Testing:      Job Specific Task Job Demands Current Ability Deficit? (Yes or No)   Lifting objects to chest Stocking shelves Able  no   2. Lifting objects over head  Stocking shelves Able  no   3. Walking Walking  Able None       Treatment     Nina received the treatments listed below:      therapeutic exercises to develop strength, endurance, ROM, flexibility, posture, and core stabilization for 25 minutes including:    Nustep 6 min   Shuttle leg press   - double 3 cord  - 3x10   - double 4.5 cord  - 3x10   Pallof Press   - 2x10 ea way   - 7lbs Cables   - Chops 7lbs x20       NOT PERFORMED   Lateral walking BTB  - to fatigue x 3  PPT with biofeedback   - leg kicks  - 2x10   DKTC   - 2x10   Clams Blue TB   - 3x10     THERAPEUTIC ACTIVITIES to improve dynamic and functional performance for bending, lifting, twisting, and carrying for 25 minutes including:      Touch and Go  - 15# 3x10   Guillermo carry   - 25lbs 4 laps ea arm  Single arm over head press on cables w/ PPT  - 3x10  - B UE   Bridging 3x10 Blue TB         MANUAL THERAPY TECHNIQUES: Soft tissue Mobilization were applied to L lumbar, SIJ and glute for 00 minutes.  Percussion gun      Functional Dry Needling to L glute med/max with 2 (75mm) needles with mechanical winding and estim to both needles. Patient had good muscular contraction with estim today. Patient had no adverse effects from todays session.       cold pack for 00 minutes to lumbar region.      Patient Education and Home Exercises     Home Exercises Provided and Patient Education Provided     Education provided:   - HEP    Written Home Exercises Provided: yes. Exercises were reviewed and Nina was able to demonstrate them prior to the end of the session.  Nina demonstrated good  understanding of the education provided. See EMR under Patient Instructions for  exercises provided during therapy sessions    ASSESSMENT     Patient was reassessed today and has shown marked improvement with lumbar range of motion and lower extremity strength and function. Patient continues to be challenged with therapeutic activities to return to prior level of functioning. Patient is progressing well and should be getting close to discharge. Cont to progress as tolerated with functional exercises. Cont to progress strengthening        Nina Is progressing well towards her goals.   Pt prognosis is Good.     Pt will continue to benefit from skilled outpatient physical therapy to address the deficits listed in the problem list box on initial evaluation, provide pt/family education and to maximize pt's level of independence in the home and community environment.     Pt's spiritual, cultural and educational needs considered and pt agreeable to plan of care and goals.     Anticipated barriers to physical therapy: self-limiting behaviors due to pain    Goals: Short Term Goals (3 Weeks):  1. Pt will be compliant with HEP to supplement PT in decreasing pain with functional mobility.  2. Pt will perform pallof press with good control to demonstrate improved core strength.  3. Pt will improve lumbar ROM by 10 degrees in all abnormal planes to improve functional mobility.  4. Pt will improve impaired LE MMTs by 1/2 score to improve strength for functional tasks.  Long Term Goals (6 Weeks):   1. Pt will improve FOTO score to </= 45% limited to decrease perceived limitation with maintaining/changing body position.   2. Pt will perform box lift to overhead with 20lbs with good control to demonstrate improved core strength.  3. Pt will improve impaired LE MMTs by 1 score to improve strength for functional tasks.  4. Pt will report no pain during lumbar ROM to promote functional mobility.     PLAN     Plan of care Certification: 4/24/2023     Outpatient Physical Therapy 2 times weekly for 6 weeks to include  the following interventions: Cervical/Lumbar Traction, Manual Therapy, Moist Heat/ Ice, Neuromuscular Re-ed, Patient Education, Self Care, Therapeutic Activities, Therapeutic Exercise, and FDN.      Upon discharge from further skilled PT intervention it will determined if the need for a work conditioning program or Functional Capacity Evaluation is required to allow the injured worker to return to work with full potential achieved, continued improvement with body mechanics with advanced functional activities, and further prevention of future work-related injuries.        Tin Irene, PT

## 2023-02-24 ENCOUNTER — OFFICE VISIT (OUTPATIENT)
Dept: URGENT CARE | Facility: CLINIC | Age: 58
End: 2023-02-24
Payer: OTHER MISCELLANEOUS

## 2023-02-24 ENCOUNTER — CLINICAL SUPPORT (OUTPATIENT)
Dept: REHABILITATION | Facility: HOSPITAL | Age: 58
End: 2023-02-24
Payer: COMMERCIAL

## 2023-02-24 VITALS
BODY MASS INDEX: 29.82 KG/M2 | HEIGHT: 67 IN | DIASTOLIC BLOOD PRESSURE: 69 MMHG | SYSTOLIC BLOOD PRESSURE: 101 MMHG | TEMPERATURE: 99 F | WEIGHT: 190 LBS | OXYGEN SATURATION: 99 % | HEART RATE: 65 BPM

## 2023-02-24 DIAGNOSIS — M53.86 DECREASED ROM OF LUMBAR SPINE: Primary | ICD-10-CM

## 2023-02-24 DIAGNOSIS — Z02.6 ENCOUNTER RELATED TO WORKER'S COMPENSATION CLAIM: ICD-10-CM

## 2023-02-24 DIAGNOSIS — S39.012D ACUTE MYOFASCIAL STRAIN OF LUMBAR REGION, SUBSEQUENT ENCOUNTER: Primary | ICD-10-CM

## 2023-02-24 PROCEDURE — 99213 PR OFFICE/OUTPT VISIT, EST, LEVL III, 20-29 MIN: ICD-10-PCS | Mod: S$GLB,,, | Performed by: NURSE PRACTITIONER

## 2023-02-24 PROCEDURE — 99213 OFFICE O/P EST LOW 20 MIN: CPT | Mod: S$GLB,,, | Performed by: NURSE PRACTITIONER

## 2023-02-24 PROCEDURE — 97110 THERAPEUTIC EXERCISES: CPT

## 2023-02-24 NOTE — LETTER
[facility]  [address] [address]  Phone: [phone]  Fax: [phone]  [facility]: [phone]    Pt Name: Nina Cuba  Injury Date: 08/22/2022   Employee ID: [id number] Date of Treatment: 02/24/2023   Company: [employer]      Appointment Time: 02:30 PM Arrived: 2:34 PM   Provider: Perla Sky NP Time Out: 3:34 PM     Office Treatment:   1. Acute myofascial strain of lumbar region, subsequent encounter    2. Encounter related to worker's compensation claim          Patient Instructions: Attention not to aggravate affected area, Daily home exercises/warm soaks, Discontinue Physical Therapy      Restrictions: Regular Duty, Discharged from Occupational Health     Return As needed. KAUSHIK

## 2023-02-24 NOTE — PLAN OF CARE
[facility]   Physical Therapy Treatment Note / Discharge     Name: Nina Cuba  Clinic Number: [id number]    Therapy Diagnosis:   Encounter Diagnosis   Name Primary?    Decreased ROM of lumbar spine Yes       Physician: No ref. provider found    Visit Date: 2/24/2023  Physician: Boston Cuba PA-C     Physician Orders: PT Eval and Treat   Medical Diagnosis from Referral:   S39.012A (ICD-10-CM) - Acute myofascial strain of lumbar region, initial encounter   Y99.0 (ICD-10-CM) - Work related injury     Evaluation Date: 10/20/2022  Authorization Period Expiration: 10/12/2023  Plan of Care Expiration: 4/24/2023  Visit # / Visits authorized: 12/12, 3/9  FOTO: 11/28/22,     Precautions: Standard      PTA Visit #: 0/5     Time In: 1140 (late)  Time Out: 1150  Total Billable Time: 10 minutes    SUBJECTIVE     Pt reports: she continues to feel great with no pain. Patient is able to perform all functional tasks at work without issue. Patient is careful with how she lifts and how much weigh she is lifting. Patient also feels that her endurance with lifting and working is much better. Patient reports she is very happy with her abilities as of today.       She was compliant with home exercise program.  Response to previous treatment: no adverse affect  Functional change: in progress    Pain: 0/10  Location: left back      OBJECTIVE     Lumbar ROM: (measured in degrees)    Degrees Quality   Flexion 105 deg        Extension 35 deg        Left Side Bending 30 deg      Right Side Bending 30 deg    Left rotation 0% limited    Right Rotation 0% limited        Active/Passive Hip ROM: (measured in degrees)     RLE LLE   Flexion WNL WNL   Abduction WNL WNL   Extension WNL WNL   ER WNL WNL   IR WNL WNL            Lower Extremity Strength (graded 0-5 out of 5)    RLE LLE   Hip flexion: 4+/5 4+/5   Knee extension: 4+/5 4+/5   Ankle dorsiflexion: 4+/5 4+/5   Posterior fibers of Gluteus medius 4+/5 4+/5    Knee flexion 4+/5 4+/5   Glute max 4+/5 4+/5   Ankle plantarflexion 4+/5 4+/5      Special Tests: ((+): pos.; (-): neg.)   Quadrant test:  -  Slump Test: -   SLR Test: -   Bridge Test: -   Pirformis Test: -       Functional Job Specific Testing:      Job Specific Task Job Demands Current Ability Deficit? (Yes or No)   Lifting objects to chest Stocking shelves Able  no   2. Lifting objects over head  Stocking shelves Able  no   3. Walking Walking  Able no       Treatment     Nina received the treatments listed below:      therapeutic exercises and assessment to develop strength, endurance, ROM, flexibility, posture, and core stabilization for 10 minutes including:    Nustep 6 min   Shuttle leg press   - double 3 cord  - 3x10   - double 4.5 cord  - 3x10   Pallof Press   - 2x10 ea way   - 7lbs Cables   - Chops 7lbs x20       NOT PERFORMED   Lateral walking BTB  - to fatigue x 3  PPT with biofeedback   - leg kicks  - 2x10   DKTC   - 2x10   Clams Blue TB   - 3x10     THERAPEUTIC ACTIVITIES to improve dynamic and functional performance for bending, lifting, twisting, and carrying for 0 minutes including:      Touch and Go  - 15# 3x10   Guillermo carry   - 25lbs 4 laps ea arm  Single arm over head press on cables w/ PPT  - 3x10  - B UE   Bridging 3x10 Blue TB         MANUAL THERAPY TECHNIQUES: Soft tissue Mobilization were applied to L lumbar, SIJ and glute for 00 minutes.  Percussion gun      Functional Dry Needling to L glute med/max with 2 (75mm) needles with mechanical winding and estim to both needles. Patient had good muscular contraction with estim today. Patient had no adverse effects from todays session.       cold pack for 00 minutes to lumbar region.      Patient Education and Home Exercises     Home Exercises Provided and Patient Education Provided     Education provided:   - HEP    Written Home Exercises Provided: yes. Exercises were reviewed and Nina was able to demonstrate them prior to the end of the  session.  Nina demonstrated good  understanding of the education provided. See EMR under Patient Instructions for exercises provided during therapy sessions    ASSESSMENT     Patient was reassessed today and has shown marked improvement with lumbar range of motion and lower extremity strength and function. Patient is symptoms free and would like to be discharged today       Nina Is progressing well towards her goals.   Pt prognosis is Good.     Pt will continue to benefit from skilled outpatient physical therapy to address the deficits listed in the problem list box on initial evaluation, provide pt/family education and to maximize pt's level of independence in the home and community environment.     Pt's spiritual, cultural and educational needs considered and pt agreeable to plan of care and goals.     Anticipated barriers to physical therapy: self-limiting behaviors due to pain    Goals: Short Term Goals (3 Weeks):  1. Pt will be compliant with HEP to supplement PT in decreasing pain with functional mobility. MET   2. Pt will perform pallof press with good control to demonstrate improved core strength. MET  3. Pt will improve lumbar ROM by 10 degrees in all abnormal planes to improve functional mobility. MET  4. Pt will improve impaired LE MMTs by 1/2 score to improve strength for functional tasks. MET  Long Term Goals (6 Weeks):   1. Pt will improve FOTO score to </= 45% limited to decrease perceived limitation with maintaining/changing body position.  MET  2. Pt will perform box lift to overhead with 20lbs with good control to demonstrate improved core strength. MET   3. Pt will improve impaired LE MMTs by 1 score to improve strength for functional tasks. MET   4. Pt will report no pain during lumbar ROM to promote functional mobility.  MET     PLAN   Discharge from PT     Tin Irene PT

## 2023-02-24 NOTE — PROGRESS NOTES
Subjective:       Patient ID: Nina Cuba is a 57 y.o. female.    Chief Complaint: Back Pain    RV Back (DOI 8/22/22). She works for [employer] as a . She is here today for a follow up for Back Pain. She states today she has no pain today. She states that she went to PT today and was released. She states that her ROM is much better.     EC    Says a few weeks ago after starting PT again she suddenly started feeling a lot better. She has been progressing well in PT, doing the HEP. Not taking any medication and has returned to work doing most of her usual tasks. She has been discharged from PT. MWT    Back Pain  Pertinent negatives include no bladder incontinence, bowel incontinence or numbness.     Constitution: Negative for activity change and generalized weakness.   Neck: Negative for neck pain and neck stiffness.   Gastrointestinal:  Negative for bowel incontinence.   Genitourinary:  Negative for bladder incontinence.   Musculoskeletal:  Negative for abnormal ROM of joint, back pain, muscle cramps and muscle ache.   Neurological:  Negative for numbness and tingling.      Objective:      Physical Exam  Constitutional:       General: She is not in acute distress.     Appearance: Normal appearance.   HENT:      Right Ear: External ear normal.      Left Ear: External ear normal.   Eyes:      Conjunctiva/sclera: Conjunctivae normal.   Cardiovascular:      Pulses: Normal pulses.   Pulmonary:      Effort: Pulmonary effort is normal.   Musculoskeletal:         General: No swelling or tenderness.      Lumbar back: No swelling, deformity, spasms or tenderness. Normal range of motion. Negative right straight leg raise test and negative left straight leg raise test.   Skin:     General: Skin is warm and dry.   Neurological:      General: No focal deficit present.      Mental Status: She is alert and oriented to person, place, and time.   Psychiatric:         Mood and Affect: Mood normal.          Behavior: Behavior normal.         Thought Content: Thought content normal.         Judgment: Judgment normal.       Assessment:       1. Acute myofascial strain of lumbar region, subsequent encounter    2. Encounter related to worker's compensation claim          Plan:     Nina is feeling a lot better for a few weeks now. She has been discharged from PT and has resumed her usual activities.        Patient Instructions: Attention not to aggravate affected area, Daily home exercises/warm soaks, Discontinue Physical Therapy   Restrictions: Regular Duty, Discharged from Occupational Health  Follow up if symptoms worsen or fail to improve.

## 2023-02-24 NOTE — PROGRESS NOTES
[facility]   Physical Therapy Treatment Note / Discharge     Name: Nina Cuba  Clinic Number: [id number]    Therapy Diagnosis:   Encounter Diagnosis   Name Primary?    Decreased ROM of lumbar spine Yes       Physician: No ref. provider found    Visit Date: 2/24/2023  Physician: Boston Cuba PA-C     Physician Orders: PT Eval and Treat   Medical Diagnosis from Referral:   S39.012A (ICD-10-CM) - Acute myofascial strain of lumbar region, initial encounter   Y99.0 (ICD-10-CM) - Work related injury     Evaluation Date: 10/20/2022  Authorization Period Expiration: 10/12/2023  Plan of Care Expiration: 4/24/2023  Visit # / Visits authorized: 12/12, 3/9  FOTO: 11/28/22,     Precautions: Standard      PTA Visit #: 0/5     Time In: 1140 (late)  Time Out: 1150  Total Billable Time: 10 minutes    SUBJECTIVE     Pt reports: she continues to feel great with no pain. Patient is able to perform all functional tasks at work without issue. Patient is careful with how she lifts and how much weigh she is lifting. Patient also feels that her endurance with lifting and working is much better. Patient reports she is very happy with her abilities as of today.       She was compliant with home exercise program.  Response to previous treatment: no adverse affect  Functional change: in progress    Pain: 0/10  Location: left back      OBJECTIVE     Lumbar ROM: (measured in degrees)    Degrees Quality   Flexion 105 deg        Extension 35 deg        Left Side Bending 30 deg      Right Side Bending 30 deg    Left rotation 0% limited    Right Rotation 0% limited        Active/Passive Hip ROM: (measured in degrees)     RLE LLE   Flexion WNL WNL   Abduction WNL WNL   Extension WNL WNL   ER WNL WNL   IR WNL WNL            Lower Extremity Strength (graded 0-5 out of 5)    RLE LLE   Hip flexion: 4+/5 4+/5   Knee extension: 4+/5 4+/5   Ankle dorsiflexion: 4+/5 4+/5   Posterior fibers of Gluteus medius 4+/5 4+/5    Knee flexion 4+/5 4+/5   Glute max 4+/5 4+/5   Ankle plantarflexion 4+/5 4+/5      Special Tests: ((+): pos.; (-): neg.)   Quadrant test:  -  Slump Test: -   SLR Test: -   Bridge Test: -   Pirformis Test: -       Functional Job Specific Testing:      Job Specific Task Job Demands Current Ability Deficit? (Yes or No)   Lifting objects to chest Stocking shelves Able  no   2. Lifting objects over head  Stocking shelves Able  no   3. Walking Walking  Able no       Treatment     Nina received the treatments listed below:      therapeutic exercises and assessment to develop strength, endurance, ROM, flexibility, posture, and core stabilization for 10 minutes including:    Nustep 6 min   Shuttle leg press   - double 3 cord  - 3x10   - double 4.5 cord  - 3x10   Pallof Press   - 2x10 ea way   - 7lbs Cables   - Chops 7lbs x20       NOT PERFORMED   Lateral walking BTB  - to fatigue x 3  PPT with biofeedback   - leg kicks  - 2x10   DKTC   - 2x10   Clams Blue TB   - 3x10     THERAPEUTIC ACTIVITIES to improve dynamic and functional performance for bending, lifting, twisting, and carrying for 0 minutes including:      Touch and Go  - 15# 3x10   Guillermo carry   - 25lbs 4 laps ea arm  Single arm over head press on cables w/ PPT  - 3x10  - B UE   Bridging 3x10 Blue TB         MANUAL THERAPY TECHNIQUES: Soft tissue Mobilization were applied to L lumbar, SIJ and glute for 00 minutes.  Percussion gun      Functional Dry Needling to L glute med/max with 2 (75mm) needles with mechanical winding and estim to both needles. Patient had good muscular contraction with estim today. Patient had no adverse effects from todays session.       cold pack for 00 minutes to lumbar region.      Patient Education and Home Exercises     Home Exercises Provided and Patient Education Provided     Education provided:   - HEP    Written Home Exercises Provided: yes. Exercises were reviewed and Nina was able to demonstrate them prior to the end of the  session.  Nina demonstrated good  understanding of the education provided. See EMR under Patient Instructions for exercises provided during therapy sessions    ASSESSMENT     Patient was reassessed today and has shown marked improvement with lumbar range of motion and lower extremity strength and function. Patient is symptoms free and would like to be discharged today       Nina Is progressing well towards her goals.   Pt prognosis is Good.     Pt will continue to benefit from skilled outpatient physical therapy to address the deficits listed in the problem list box on initial evaluation, provide pt/family education and to maximize pt's level of independence in the home and community environment.     Pt's spiritual, cultural and educational needs considered and pt agreeable to plan of care and goals.     Anticipated barriers to physical therapy: self-limiting behaviors due to pain    Goals: Short Term Goals (3 Weeks):  1. Pt will be compliant with HEP to supplement PT in decreasing pain with functional mobility. MET   2. Pt will perform pallof press with good control to demonstrate improved core strength. MET  3. Pt will improve lumbar ROM by 10 degrees in all abnormal planes to improve functional mobility. MET  4. Pt will improve impaired LE MMTs by 1/2 score to improve strength for functional tasks. MET  Long Term Goals (6 Weeks):   1. Pt will improve FOTO score to </= 45% limited to decrease perceived limitation with maintaining/changing body position.  MET  2. Pt will perform box lift to overhead with 20lbs with good control to demonstrate improved core strength. MET   3. Pt will improve impaired LE MMTs by 1 score to improve strength for functional tasks. MET   4. Pt will report no pain during lumbar ROM to promote functional mobility.  MET     PLAN   Discharge from PT     Tin Irene PT

## 2023-02-27 DIAGNOSIS — Z12.31 OTHER SCREENING MAMMOGRAM: ICD-10-CM

## 2023-03-01 ENCOUNTER — PATIENT MESSAGE (OUTPATIENT)
Dept: ADMINISTRATIVE | Facility: HOSPITAL | Age: 58
End: 2023-03-01
Payer: COMMERCIAL

## 2023-03-14 ENCOUNTER — TELEPHONE (OUTPATIENT)
Dept: GASTROENTEROLOGY | Facility: CLINIC | Age: 58
End: 2023-03-14
Payer: COMMERCIAL

## 2023-03-14 NOTE — TELEPHONE ENCOUNTER
Attempted to contact pt regarding message below. Pt didn't answer and I left a detail message to return call to our office.      ----- Message from Kaley Laurent sent at 3/14/2023  3:44 PM CDT -----  Regarding: Pt Advice / refill  Contact: [phone]  Rx Refill/Request    Is this a Refill or New Rx:  Refill     Rx Name and Strength:  dicyclomine (BENTYL) 20 mg tablet      Preferred Pharmacy with phone number:    Phone: [phone] Fax: [phone]    [facility]/pharmacy #[id number] - [place] - [address]  [address] [address]  Phone: [phone] Fax: [phone]         Communication Preference:  Additional Information: PT States that she is out of meds. Please Call

## 2023-03-15 ENCOUNTER — TELEPHONE (OUTPATIENT)
Dept: GASTROENTEROLOGY | Facility: CLINIC | Age: 58
End: 2023-03-15
Payer: COMMERCIAL

## 2023-03-15 RX ORDER — DICYCLOMINE HYDROCHLORIDE 20 MG/1
20 TABLET ORAL EVERY 6 HOURS
Qty: 120 TABLET | Refills: 0 | Status: SHIPPED | OUTPATIENT
Start: 2023-03-15 | End: 2023-04-14

## 2023-03-15 RX ORDER — PANTOPRAZOLE SODIUM 40 MG/1
40 TABLET, DELAYED RELEASE ORAL DAILY
Qty: 90 TABLET | Refills: 3 | Status: SHIPPED | OUTPATIENT
Start: 2023-03-15 | End: 2023-07-05 | Stop reason: SDUPTHER

## 2023-03-15 NOTE — TELEPHONE ENCOUNTER
Attempted to contact pt regarding message below. Pt didn't answer and I left a detail message to return call to our office.      ----- Message from Ever Culp sent at 3/15/2023 10:00 AM CDT -----  Contact: pt  Pt requesting call back RE: returning call      Confirmed contact below:  Contact Name:Nina Cuba  Phone Number: [phone]

## 2023-03-15 NOTE — TELEPHONE ENCOUNTER
Spoke with the patient requesting new Rx for dicyclomine 20 mg and pantoprazole 40 mg [facility] [place] and thank me for calling back.  Last Gi office visit with Dr. Roberson 9/7/2021  Rx pend and routed to Dr. Roberson     ----- Message from Tea Skelton sent at 3/15/2023 10:28 AM CDT -----  Regarding: pt advice  Contact: pt [phone]  Missed Callback     Pt returning callback from missed call. Requesting to speak with somebody in Dr. Roberson  office. Please call.

## 2023-05-11 ENCOUNTER — OFFICE VISIT (OUTPATIENT)
Dept: INTERNAL MEDICINE | Facility: CLINIC | Age: 58
End: 2023-05-11
Payer: MEDICAID

## 2023-05-11 VITALS
OXYGEN SATURATION: 97 % | BODY MASS INDEX: 31.04 KG/M2 | WEIGHT: 197.75 LBS | DIASTOLIC BLOOD PRESSURE: 72 MMHG | HEART RATE: 74 BPM | HEIGHT: 67 IN | TEMPERATURE: 98 F | RESPIRATION RATE: 19 BRPM | SYSTOLIC BLOOD PRESSURE: 118 MMHG

## 2023-05-11 DIAGNOSIS — F41.9 ANXIETY: ICD-10-CM

## 2023-05-11 DIAGNOSIS — E66.9 OBESITY (BMI 30-39.9): ICD-10-CM

## 2023-05-11 DIAGNOSIS — M19.049 OSTEOARTHRITIS OF HAND, UNSPECIFIED LATERALITY, UNSPECIFIED OSTEOARTHRITIS TYPE: ICD-10-CM

## 2023-05-11 DIAGNOSIS — K21.9 GASTROESOPHAGEAL REFLUX DISEASE, UNSPECIFIED WHETHER ESOPHAGITIS PRESENT: ICD-10-CM

## 2023-05-11 DIAGNOSIS — K44.9 HIATAL HERNIA: Primary | ICD-10-CM

## 2023-05-11 PROCEDURE — 1159F MED LIST DOCD IN RCRD: CPT | Mod: CPTII,,, | Performed by: INTERNAL MEDICINE

## 2023-05-11 PROCEDURE — 1159F PR MEDICATION LIST DOCUMENTED IN MEDICAL RECORD: ICD-10-PCS | Mod: CPTII,,, | Performed by: INTERNAL MEDICINE

## 2023-05-11 PROCEDURE — 3074F PR MOST RECENT SYSTOLIC BLOOD PRESSURE < 130 MM HG: ICD-10-PCS | Mod: CPTII,,, | Performed by: INTERNAL MEDICINE

## 2023-05-11 PROCEDURE — 3078F DIAST BP <80 MM HG: CPT | Mod: CPTII,,, | Performed by: INTERNAL MEDICINE

## 2023-05-11 PROCEDURE — 3008F BODY MASS INDEX DOCD: CPT | Mod: CPTII,,, | Performed by: INTERNAL MEDICINE

## 2023-05-11 PROCEDURE — 1160F PR REVIEW ALL MEDS BY PRESCRIBER/CLIN PHARMACIST DOCUMENTED: ICD-10-PCS | Mod: CPTII,,, | Performed by: INTERNAL MEDICINE

## 2023-05-11 PROCEDURE — 3078F PR MOST RECENT DIASTOLIC BLOOD PRESSURE < 80 MM HG: ICD-10-PCS | Mod: CPTII,,, | Performed by: INTERNAL MEDICINE

## 2023-05-11 PROCEDURE — 99214 OFFICE O/P EST MOD 30 MIN: CPT | Mod: S$PBB,,, | Performed by: INTERNAL MEDICINE

## 2023-05-11 PROCEDURE — 99214 OFFICE O/P EST MOD 30 MIN: CPT | Mod: PBBFAC,PO | Performed by: INTERNAL MEDICINE

## 2023-05-11 PROCEDURE — 99999 PR PBB SHADOW E&M-EST. PATIENT-LVL IV: CPT | Mod: PBBFAC,,, | Performed by: INTERNAL MEDICINE

## 2023-05-11 PROCEDURE — 1160F RVW MEDS BY RX/DR IN RCRD: CPT | Mod: CPTII,,, | Performed by: INTERNAL MEDICINE

## 2023-05-11 PROCEDURE — 3008F PR BODY MASS INDEX (BMI) DOCUMENTED: ICD-10-PCS | Mod: CPTII,,, | Performed by: INTERNAL MEDICINE

## 2023-05-11 PROCEDURE — 99214 PR OFFICE/OUTPT VISIT, EST, LEVL IV, 30-39 MIN: ICD-10-PCS | Mod: S$PBB,,, | Performed by: INTERNAL MEDICINE

## 2023-05-11 PROCEDURE — 3074F SYST BP LT 130 MM HG: CPT | Mod: CPTII,,, | Performed by: INTERNAL MEDICINE

## 2023-05-11 PROCEDURE — 99999 PR PBB SHADOW E&M-EST. PATIENT-LVL IV: ICD-10-PCS | Mod: PBBFAC,,, | Performed by: INTERNAL MEDICINE

## 2023-05-11 RX ORDER — LORAZEPAM 0.5 MG/1
TABLET ORAL
Qty: 30 TABLET | Refills: 1 | Status: SHIPPED | OUTPATIENT
Start: 2023-05-11 | End: 2023-07-25

## 2023-05-11 NOTE — PROGRESS NOTES
Subjective     Patient ID: Nina Cuba is a 58 y.o. female.    Chief Complaint: Annual Exam and Arthritis    HPI  58 y.o. Female with Anxiety, Obesity, GERD/Hiatal hernia, OA hands is here 6 month f/u. Requesting refills ofher Ativan.      Review of Systems   Constitutional:  Negative for activity change, appetite change, chills, diaphoresis, fatigue, fever and unexpected weight change.   HENT:  Negative for nasal congestion, mouth sores, postnasal drip, rhinorrhea, sinus pressure/congestion, sneezing, sore throat, trouble swallowing and voice change.    Eyes:  Negative for pain, discharge and visual disturbance.   Respiratory:  Negative for cough, shortness of breath and wheezing.    Cardiovascular:  Negative for chest pain, palpitations and leg swelling.   Gastrointestinal:  Negative for abdominal pain, blood in stool, constipation, diarrhea, nausea and vomiting.   Endocrine: Negative for cold intolerance and heat intolerance.   Genitourinary:  Negative for difficulty urinating, dysuria, frequency, hematuria and urgency.   Musculoskeletal:  Negative for arthralgias and myalgias.   Integumentary:  Negative for rash and wound.   Allergic/Immunologic: Negative for environmental allergies and food allergies.   Neurological:  Negative for dizziness, tremors, seizures, syncope, weakness, light-headedness and headaches.   Hematological:  Negative for adenopathy. Does not bruise/bleed easily.   Psychiatric/Behavioral:  Negative for confusion, self-injury, sleep disturbance and suicidal ideas. The patient is nervous/anxious.         Objective     Physical Exam  Vitals and nursing note reviewed.   Constitutional:       General: She is not in acute distress.     Appearance: Normal appearance. She is well-developed. She is not diaphoretic.   HENT:      Head: Normocephalic and atraumatic.      Right Ear: External ear normal.      Left Ear: External ear normal.      Nose: Nose normal.      Mouth/Throat:      Pharynx: No  oropharyngeal exudate.   Eyes:      General: No scleral icterus.        Right eye: No discharge.         Left eye: No discharge.      Conjunctiva/sclera: Conjunctivae normal.      Pupils: Pupils are equal, round, and reactive to light.   Neck:      Thyroid: No thyromegaly.      Vascular: No JVD.   Cardiovascular:      Rate and Rhythm: Normal rate and regular rhythm.      Pulses: Normal pulses.      Heart sounds: Normal heart sounds. No murmur heard.  Pulmonary:      Effort: Pulmonary effort is normal. No respiratory distress.      Breath sounds: Normal breath sounds. No wheezing, rhonchi or rales.   Chest:      Chest wall: No tenderness.   Abdominal:      General: Bowel sounds are normal. There is no distension.      Palpations: Abdomen is soft.      Tenderness: There is no abdominal tenderness. There is no guarding or rebound.   Musculoskeletal:      Cervical back: Neck supple.      Right lower leg: No edema.      Left lower leg: No edema.   Lymphadenopathy:      Cervical: No cervical adenopathy.   Skin:     General: Skin is warm and dry.      Coloration: Skin is not pale.      Findings: No rash.   Neurological:      General: No focal deficit present.      Mental Status: She is alert and oriented to person, place, and time.      Gait: Gait normal.   Psychiatric:         Behavior: Behavior normal.         Thought Content: Thought content normal.         Judgment: Judgment normal.          Assessment and Plan     Problem List Items Addressed This Visit          Psychiatric    Anxiety    Relevant Medications    LORazepam (ATIVAN) 0.5 MG tablet       Endocrine    Obesity (BMI 30-39.9)       GI    Hiatal hernia - Primary    Gastroesophageal reflux disease     Other Visit Diagnoses       Osteoarthritis of hand, unspecified laterality, unspecified osteoarthritis type        Relevant Orders    Ambulatory referral/consult to Orthopedics        Obesity- diet/exercise stressed       GERD/Hiatal hernia- stable on Protonix 40  mg daily      Anxiety- stable refill Ativan PRN      OA hand- referral to Ortho     F/u in 6 months for annual exam

## 2023-05-30 ENCOUNTER — TELEPHONE (OUTPATIENT)
Dept: ORTHOPEDICS | Facility: HOSPITAL | Age: 58
End: 2023-05-30
Payer: MEDICAID

## 2023-05-30 DIAGNOSIS — M79.642 BILATERAL HAND PAIN: Primary | ICD-10-CM

## 2023-05-30 DIAGNOSIS — M79.641 BILATERAL HAND PAIN: Primary | ICD-10-CM

## 2023-05-30 NOTE — TELEPHONE ENCOUNTER
----- Message from Randell Lowery sent at 5/23/2023  5:29 PM CDT -----    ----- Message -----  From: Louise Aguirre  Sent: 5/23/2023   4:06 PM CDT  To: Trip Hanley Staff    Type:  Needs Medical Advice    Who Called:  Pt  Symptoms (please be specific):  Right hand pain/arthritis    How long has patient had these symptoms:   A while  Pharmacy name and phone #:    Would the patient rather a call back or a response via [facility]?  call  Best Call Back Number:  [phone]  Additional Information:  Pt states that Dr. Jackson recommended her to schedule an appt with Dr. Dominique but no available appt on my end.  Pt would like a call back.

## 2023-06-22 ENCOUNTER — HOSPITAL ENCOUNTER (OUTPATIENT)
Dept: RADIOLOGY | Facility: HOSPITAL | Age: 58
Discharge: HOME | End: 2023-06-22
Attending: ORTHOPAEDIC SURGERY
Payer: MEDICAID

## 2023-06-22 DIAGNOSIS — M79.642 BILATERAL HAND PAIN: ICD-10-CM

## 2023-06-22 DIAGNOSIS — M79.641 BILATERAL HAND PAIN: ICD-10-CM

## 2023-06-22 PROCEDURE — 73130 X-RAY EXAM OF HAND: CPT | Mod: TC,50,FY

## 2023-06-22 PROCEDURE — 73130 PR  X-RAY HAND 3+ VW: ICD-10-PCS | Mod: 26,LT,, | Performed by: RADIOLOGY

## 2023-06-22 PROCEDURE — 73130 X-RAY EXAM OF HAND: CPT | Mod: 26,LT,, | Performed by: RADIOLOGY

## 2023-06-22 PROCEDURE — 73130 X-RAY EXAM OF HAND: CPT | Mod: 26,RT,, | Performed by: RADIOLOGY

## 2023-06-26 ENCOUNTER — OFFICE VISIT (OUTPATIENT)
Dept: ORTHOPEDICS | Facility: CLINIC | Age: 58
End: 2023-06-26
Payer: MEDICAID

## 2023-06-26 VITALS — WEIGHT: 197 LBS | BODY MASS INDEX: 30.92 KG/M2 | HEIGHT: 67 IN

## 2023-06-26 DIAGNOSIS — G56.01 RIGHT CARPAL TUNNEL SYNDROME: Primary | ICD-10-CM

## 2023-06-26 DIAGNOSIS — M79.641 CHRONIC PAIN OF RIGHT HAND: ICD-10-CM

## 2023-06-26 DIAGNOSIS — M18.11 ARTHRITIS OF CARPOMETACARPAL (CMC) JOINT OF RIGHT THUMB: ICD-10-CM

## 2023-06-26 DIAGNOSIS — G89.29 CHRONIC PAIN OF RIGHT HAND: ICD-10-CM

## 2023-06-26 DIAGNOSIS — G56.02 CARPAL TUNNEL SYNDROME ON LEFT: ICD-10-CM

## 2023-06-26 PROCEDURE — 20600 SMALL JOINT ASPIRATION/INJECTION: R THUMB CMC: ICD-10-PCS | Mod: S$PBB,RT,, | Performed by: ORTHOPAEDIC SURGERY

## 2023-06-26 PROCEDURE — 1160F RVW MEDS BY RX/DR IN RCRD: CPT | Mod: CPTII,,, | Performed by: ORTHOPAEDIC SURGERY

## 2023-06-26 PROCEDURE — 1159F PR MEDICATION LIST DOCUMENTED IN MEDICAL RECORD: ICD-10-PCS | Mod: CPTII,,, | Performed by: ORTHOPAEDIC SURGERY

## 2023-06-26 PROCEDURE — 99999 PR PBB SHADOW E&M-EST. PATIENT-LVL III: CPT | Mod: PBBFAC,,, | Performed by: ORTHOPAEDIC SURGERY

## 2023-06-26 PROCEDURE — 3008F PR BODY MASS INDEX (BMI) DOCUMENTED: ICD-10-PCS | Mod: CPTII,,, | Performed by: ORTHOPAEDIC SURGERY

## 2023-06-26 PROCEDURE — 99213 OFFICE O/P EST LOW 20 MIN: CPT | Mod: PBBFAC,PO | Performed by: ORTHOPAEDIC SURGERY

## 2023-06-26 PROCEDURE — 3008F BODY MASS INDEX DOCD: CPT | Mod: CPTII,,, | Performed by: ORTHOPAEDIC SURGERY

## 2023-06-26 PROCEDURE — 1159F MED LIST DOCD IN RCRD: CPT | Mod: CPTII,,, | Performed by: ORTHOPAEDIC SURGERY

## 2023-06-26 PROCEDURE — 99204 OFFICE O/P NEW MOD 45 MIN: CPT | Mod: S$PBB,25,, | Performed by: ORTHOPAEDIC SURGERY

## 2023-06-26 PROCEDURE — 1160F PR REVIEW ALL MEDS BY PRESCRIBER/CLIN PHARMACIST DOCUMENTED: ICD-10-PCS | Mod: CPTII,,, | Performed by: ORTHOPAEDIC SURGERY

## 2023-06-26 PROCEDURE — 20600 DRAIN/INJ JOINT/BURSA W/O US: CPT | Mod: PBBFAC,PO,RT | Performed by: ORTHOPAEDIC SURGERY

## 2023-06-26 PROCEDURE — 99999 PR PBB SHADOW E&M-EST. PATIENT-LVL III: ICD-10-PCS | Mod: PBBFAC,,, | Performed by: ORTHOPAEDIC SURGERY

## 2023-06-26 PROCEDURE — 99204 PR OFFICE/OUTPT VISIT, NEW, LEVL IV, 45-59 MIN: ICD-10-PCS | Mod: S$PBB,25,, | Performed by: ORTHOPAEDIC SURGERY

## 2023-06-26 RX ADMIN — METHYLPREDNISOLONE ACETATE 40 MG: 40 INJECTION, SUSPENSION INTRALESIONAL; INTRAMUSCULAR; INTRASYNOVIAL; SOFT TISSUE at 01:06

## 2023-06-26 NOTE — PROGRESS NOTES
[facility]  History & Physical  Orthopedics    SUBJECTIVE:      COVID-19 attestation:  This patient was treated during the COVID- pandemic.  This was discussed with the patient, they are aware of our current policies and procedures, were given the option of delaying their visit and or switching to a virtual visit, delaying their surgery when applicable, and they elect to proceed.    Chief Complaint:   Chief Complaint   Patient presents with    Numbness    Extremity Weakness    Hand Pain       Referring Provider: Dr. Jackson    History of Present Illness:  Patient is a 58 y.o. right hand dominant female who presents today with complaints of R>L hand pain that began about a year ago. She has been having numbness/tingling/burning pain along her radiodorsal wrist. She is also c/o basilar thumb pain R>L. She says that she has been losing  strength and dropping objects at work.    The patient is a  at [employer].     The patient denies any fevers, chills, N/V, D/C and presents for evaluation.       Past Medical History:   Diagnosis Date    Anxiety     Back problem     GERD (gastroesophageal reflux disease)     Hiatal hernia     Obesity (BMI 30-39.9)      Past Surgical History:   Procedure Laterality Date    COLONOSCOPY N/A 11/3/2022    Procedure: COLONOSCOPY;  Surgeon: Mihir Roberson MD;  Location: [address]);  Service: Endoscopy;  Laterality: N/A;    ESOPHAGOGASTRODUODENOSCOPY N/A 11/3/2022    Procedure: EGD (ESOPHAGOGASTRODUODENOSCOPY);  Surgeon: Mihir Roberson MD;  Location: [address]);  Service: Endoscopy;  Laterality: N/A;  rapid-inst email am prep-tb-order re-created from  order-ray only  pre call done, no answer  verified arrival time 9am    TONSILLECTOMY       Review of patient's allergies indicates:  No Known Allergies  Social History     Social History Narrative    Associate at [employer]      Family History   Problem Relation Age of Onset     "Diabetes Mother     Hypertension Mother     Lymphoma Father     Kidney cancer Brother     Heart disease Maternal Grandfather     Stroke Neg Hx          Current Outpatient Medications:     estradioL (ESTRACE) 0.01 % (0.1 mg/gram) vaginal cream, Place 1 g vaginally twice a week., Disp: 42.5 g, Rfl: 3    estradioL (ESTRACE) 1 MG tablet, TAKE 1 TABLET BY MOUTH EVERY DAY, Disp: 90 tablet, Rfl: 4    gabapentin (NEURONTIN) 300 MG capsule, Take by mouth., Disp: , Rfl:     ibuprofen (ADVIL,MOTRIN) 600 MG tablet, Take 1 tablet (600 mg total) by mouth every 8 (eight) hours as needed for Pain. (Patient not taking: Reported on 5/11/2023), Disp: 30 tablet, Rfl: 0    LORazepam (ATIVAN) 0.5 MG tablet, TAKE 1 TABLET BY MOUTH EVERY 12 HOURS AS NEEDED FOR ANXIETY, Disp: 30 tablet, Rfl: 1    meloxicam (MOBIC) 15 MG tablet, Take 15 mg by mouth once daily., Disp: , Rfl:     pantoprazole (PROTONIX) 40 MG tablet, Take 1 tablet (40 mg total) by mouth once daily., Disp: 90 tablet, Rfl: 3    progesterone (PROMETRIUM) 100 MG capsule, TAKE 1 CAPSULE BY MOUTH EVERY EVENING, Disp: 90 capsule, Rfl: 4    tiZANidine (ZANAFLEX) 4 MG tablet, Take 1 tablet (4 mg total) by mouth 2 (two) times daily as needed (muscle spasms). Take off duty only. May cause drowsiness. (Patient not taking: Reported on 5/11/2023), Disp: 20 tablet, Rfl: 0    ROS    Review of Systems:  Constitutional: no fever or chills  Eyes: no visual changes  ENT: no nasal congestion or sore throat  Respiratory: no cough or shortness of breath  Cardiovascular: no chest pain  Gastrointestinal: no nausea or vomiting, tolerating diet  Musculoskeletal: pain, soreness, numbness/tingling, and decreased ROM    OBJECTIVE:      Vital Signs (Most Recent):  Vitals:    06/26/23 1406   Weight: 89.4 kg (197 lb)   Height: 5' 7" (1.702 m)     Body mass index is 30.85 kg/m².    Physical Exam    Physical Exam:  Constitutional: The patient appears well-developed and well-nourished. No distress.   Head: " Normocephalic and atraumatic.   Nose: Nose normal.   Eyes: Conjunctivae and EOM are normal.   Neck: No tracheal deviation present.   Cardiovascular: Normal rate and intact distal pulses.    Pulmonary/Chest: Effort normal. No respiratory distress.   Abdominal: There is no guarding.   Lymphatic: Negative for adenopathy   Neurological: The patient is alert.   Psychiatric: The patient has a normal mood and affect.     Cervical Exam:  ROM full, supple  Negative Spurling's  Negative Simental's    Bilateral Hand/Wrist Examination:    Observation/Inspection:  Swelling    Right basilar thumb joint    Deformity  none  Discoloration  none     Scars   none    Atrophy  none    HAND/WRIST EXAMINATION:     Tender to palpation bilateral basilar thumb joints, positive grind test bilaterally, positive compression, Tinel's and Phalen's bilateral wrists.  ROM hand/wrist/elbow full    Neurovascular Exam:  Digits WWP, brisk CR < 3s throughout  NVI motor/LTS to M/R/U nerves, radial pulse 2+  2+ biceps and brachioradialis reflexes    Diagnostic Results:     X-rays AP, lateral and oblique bilateral hands taken today are independently reviewed by me and show bilateral Eaton stage III basilar thumb arthritis.     ASSESSMENT/PLAN:      58 y.o. yo female with   Encounter Diagnoses   Name Primary?    Right carpal tunnel syndrome Yes    Carpal tunnel syndrome on left     Arthritis of carpometacarpal (CMC) joint of right thumb     Chronic pain of right hand       Plan:  We have discussed the natural history of basilar thumb arthritis and carpal tunnel including treatment options such as splinting, oral and topical anti-inflammatories, cortisone injections and surgery. I have given bilateral thumb 3D braces for comfort.  I have ordered an EMG /NCS to evaluate the severity of her carpal tunnel syndrome.  I have also given her bilateral carpal tunnel night splints.    -I have offered her a selective injection. I have explained the risks, benefits, and  alternatives of the procedure in detail.  The patient voices understanding and all questions have been answered. The patient agrees to proceed as planned. So after a sterile prep of the skin in the normal fashion the right basilar thumb joint was injected using a 25 gauge needle with a combination of 1cc 1% plain lidocaine and 40 mg of methylprednisolone.  The patient is cautioned and immediate relief of pain is secondary to the local anesthetic and will be temporary.  After the anesthetic wears off there may be a increase in pain that may last for a few hours or a few days and they should use ice to help alleviate this flair up of pain. Patient tolerated the procedure well.    - F/U after EMG/NCS   - Call with any questions/concerns in the interim     The patient's pathophysiology was explained in detail with reference to x-rays, models, other visual aids as appropriate.  Treatment options were discussed in detail.  Questions were invited and answered to the patient's satisfaction. I reviewed Primary care , and other specialty's notes to better coordinate patient's care.          Brigid Dominique MD    Please be aware that this note has been generated with the assistance of MModal voice-to-text.  Please excuse any spelling or grammatical errors.

## 2023-07-05 RX ORDER — PANTOPRAZOLE SODIUM 40 MG/1
40 TABLET, DELAYED RELEASE ORAL DAILY
Qty: 90 TABLET | Refills: 3 | Status: SHIPPED | OUTPATIENT
Start: 2023-07-05 | End: 2023-07-18 | Stop reason: CLARIF

## 2023-07-05 NOTE — TELEPHONE ENCOUNTER
----- Message from Manohar Haro MA sent at 7/5/2023 10:08 AM CDT -----  Regarding: medication refill  Contact: pt [phone]  Rx Refill/Request    Is this a Refill or New Rx:  Refill   Rx Name and Strength:  pantoprazole (PROTONIX) 40 MG tablet 90 tablet   Preferred Pharmacy with phone number:    [id number] - [place]  [address] [place] [address]  Phone: [phone] Fax: [phone]      Communication Preference:[phone]    Additional Information: patient stated that she need a new script  because [facility] transfer [facility] medication

## 2023-07-13 RX ORDER — METHYLPREDNISOLONE ACETATE 40 MG/ML
40 INJECTION, SUSPENSION INTRA-ARTICULAR; INTRALESIONAL; INTRAMUSCULAR; SOFT TISSUE
Status: DISCONTINUED | OUTPATIENT
Start: 2023-06-26 | End: 2023-07-13 | Stop reason: HOSPADM

## 2023-07-13 NOTE — PROCEDURES
Small Joint Aspiration/Injection: R thumb CMC    Date/Time: 6/26/2023 1:30 PM  Performed by: Brigid Dominique MD  Authorized by: Brigid Dominique MD     Consent Done?:  Yes (Verbal)  Indications:  Pain  Timeout: prior to procedure the correct patient, procedure, and site was verified    Prep: patient was prepped and draped in usual sterile fashion      Local anesthesia used?: Yes    Anesthesia:  Local infiltration  Local anesthetic:  Lidocaine 1% without epinephrine  Location:  Thumb  Site:  R thumb CMC  Needle size:  25 G  Medications:  40 mg methylPREDNISolone acetate 40 mg/mL  Patient tolerance:  Patient tolerated the procedure well with no immediate complications

## 2023-07-18 ENCOUNTER — TELEPHONE (OUTPATIENT)
Dept: GASTROENTEROLOGY | Facility: CLINIC | Age: 58
End: 2023-07-18
Payer: COMMERCIAL

## 2023-07-18 RX ORDER — OMEPRAZOLE 40 MG/1
40 CAPSULE, DELAYED RELEASE ORAL EVERY MORNING
Qty: 30 CAPSULE | Refills: 11 | Status: SHIPPED | OUTPATIENT
Start: 2023-07-18

## 2023-07-25 DIAGNOSIS — F41.9 ANXIETY: ICD-10-CM

## 2023-07-25 RX ORDER — LORAZEPAM 0.5 MG/1
TABLET ORAL
Qty: 30 TABLET | Refills: 1 | Status: SHIPPED | OUTPATIENT
Start: 2023-07-25 | End: 2023-09-18 | Stop reason: SDUPTHER

## 2023-07-25 NOTE — TELEPHONE ENCOUNTER
No care due was identified.  [facility] Embedded Care Due Messages. Reference number: [id number].   7/25/2023 9:38:41 AM CDT

## 2023-08-15 ENCOUNTER — PROCEDURE VISIT (OUTPATIENT)
Dept: NEUROLOGY | Facility: CLINIC | Age: 58
End: 2023-08-15
Payer: MEDICAID

## 2023-08-15 DIAGNOSIS — G56.02 CARPAL TUNNEL SYNDROME ON LEFT: ICD-10-CM

## 2023-08-15 DIAGNOSIS — G56.01 RIGHT CARPAL TUNNEL SYNDROME: ICD-10-CM

## 2023-08-15 PROCEDURE — 95886 MUSC TEST DONE W/N TEST COMP: CPT | Mod: PBBFAC | Performed by: PHYSICAL MEDICINE & REHABILITATION

## 2023-08-15 PROCEDURE — 95911 NRV CNDJ TEST 9-10 STUDIES: CPT | Mod: 26,S$PBB,, | Performed by: PHYSICAL MEDICINE & REHABILITATION

## 2023-08-15 PROCEDURE — 95886 PR EMG COMPLETE, W/ NERVE CONDUCTION STUDIES, 5+ MUSCLES: ICD-10-PCS | Mod: 26,S$PBB,, | Performed by: PHYSICAL MEDICINE & REHABILITATION

## 2023-08-15 PROCEDURE — 95911 PR NERVE CONDUCTION STUDY; 9-10 STUDIES: ICD-10-PCS | Mod: 26,S$PBB,, | Performed by: PHYSICAL MEDICINE & REHABILITATION

## 2023-08-15 PROCEDURE — 95911 NRV CNDJ TEST 9-10 STUDIES: CPT | Mod: PBBFAC | Performed by: PHYSICAL MEDICINE & REHABILITATION

## 2023-08-15 PROCEDURE — 95886 MUSC TEST DONE W/N TEST COMP: CPT | Mod: 26,S$PBB,, | Performed by: PHYSICAL MEDICINE & REHABILITATION

## 2023-08-15 NOTE — PROCEDURES
Test Date:  8/15/2023    Patient: nina ye : 1965 Physician: Favio Martin D.O.   ID#: [id number] SEX: Female Ref. Phys: Brigid Dominique MD     HPI: Nina Ye is a 58 y.o.female who presents for NCS/EMG to evaluate CTS bilaterally, R>L.    NCV & EMG Findings:  Evaluation of the right median sensory nerve showed decreased conduction velocity.  The right Median-Radial (Dig I) sensory nerve showed abnormal peak latency difference ((Median Wrist-Dig I)-(Radial Wrist-Dig I)).  The right Median-Ulnar Palmar sensory nerve showed abnormal peak latency difference ((Median Palm-Wrist)-(Ulnar Palm-Wrist)).  All remaining nerves (as indicated in the following tables) were within normal limits.  All examined muscles (as indicated in the following table) showed no evidence of electrical instability.    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 Carpal tunnel syndrome).  There is no motor axonal loss.  There is no active denervation.  This is graded as Mild in severity on the right.    This is a normal electrophysiologic study of the left upper extremity.        ___________________________  Favio Martin D.O.        NCS+  Motor Nerve Results      Latency Amplitude F-Lat Segment Distance CV Comment   Site (ms) Norm (mV) Norm (ms)  (cm) (m/s) Norm    Left Median (APB)   Wrist 3.0  < 4.4 6.0  > 3.8  Wrist-Palm - - -    Elbow 7.0 - 5.0 -  Elbow-Wrist 25 63  > 51    Right Median (APB)   Wrist 3.8  < 4.4 4.0  > 3.8  Wrist-Palm - - -    Elbow 7.8 - 4.1 -  Elbow-Wrist 22 55  > 51    Left Ulnar (ADM)   Wrist 1.85  < 3.7 5.3  > 3.0         Bel Elbow 5.3 - 5.2 -  Bel Elbow-Wrist 21 60  > 52    Abv Elbow 6.5 - 5.0 -  Abv Elbow-Bel Elbow 9 75  > 43    Right Ulnar (ADM)   Wrist 2.1  < 3.7 5.1  > 3.0         Bel Elbow 5.8 - 4.8 -  Bel Elbow-Wrist 25 68  > 52    Abv Elbow 6.7 - 5.2 -  Abv Elbow-Bel Elbow 8 89  > 43      Sensory Nerve Results       Latency (Peak) Amplitude (P-P) Segment Distance CV Comment   Site (ms) Norm (µV) Norm  (cm) (m/s) Norm    Left Median   Wrist-Dig I 2.8 - 23 - Wrist-Dig I 10 36 -    Wrist-Dig II 3.4  < 4.0 29  > 8 Wrist-Dig II 14 41  > 39    Palm-Wrist 1.93 - 97 - Palm-Wrist - - -    Right Median   Wrist-Dig I 3.5 - 6 - Wrist-Dig I 10 29 -    Wrist-Dig II 3.8  < 4.0 16  > 8 Wrist-Dig II 14 *37  > 39    Palm-Wrist 2.4 - 34 - Palm-Wrist - - -    Left Ulnar   Palm-Wrist 1.98 - 19 - Palm-Wrist - - -    Right Ulnar   Palm-Wrist 2.0 - 11 - Palm-Wrist - - -    Left Radial   Wrist-Dig I 2.6 - 13 - Wrist-Dig I 10 38 -    Right Radial   Wrist-Dig I 2.1 - 15 - Wrist-Dig I 10 48 -      Inter-Nerve Comparisons     Nerve 1 Value 1 Nerve 2 Value 2 Parameter Result Normal   Sensory Sites   R Median Wrist-Dig I 3.5 ms R Radial Wrist-Dig I 2.1 ms Peak Lat Diff *1.4 ms <0.40   L Median Wrist-Dig I 2.8 ms L Radial Wrist-Dig I 2.6 ms Peak Lat Diff 0.20 ms <0.40   R Median Palm-Wrist 2.4 ms R Ulnar Palm-Wrist 2.0 ms Peak Lat Diff *0.40 ms <0.30   L Median Palm-Wrist 1.9 ms L Ulnar Palm-Wrist 2.0 ms Peak Lat Diff 0.05 ms <0.30     EMG+     Side Muscle Nerve Root Ins Act Fibs Psw Amp Dur Poly Recrt Int Pat Comment   Left Biceps Musculocut C5-C6 Nml Nml Nml Nml Nml 0 Nml Nml    Left Deltoid Axillary C5-C6 Nml Nml Nml Nml Nml 0 Nml Nml    Left Pronator Teres Median C6-C7 Nml Nml Nml Nml Nml 0 Nml Nml    Left Triceps Radial C6-C8 Nml Nml Nml Nml Nml 0 Nml Nml    Left FDI Ulnar C8-T1 Nml Nml Nml Nml Nml 0 Nml Nml    Right Deltoid Axillary C5-C6 Nml Nml Nml Nml Nml 0 Nml Nml    Right Biceps Musculocut C5-C6 Nml Nml Nml Nml Nml 0 Nml Nml    Right Pronator Teres Median C6-C7 Nml Nml Nml Nml Nml 0 Nml Nml    Right Triceps Radial C6-C8 Nml Nml Nml Nml Nml 0 Nml Nml    Right APB Median C8-T1 Nml Nml Nml Nml Nml 0 Nml Nml            Waveforms:    Motor              Sensory

## 2023-08-16 ENCOUNTER — OFFICE VISIT (OUTPATIENT)
Dept: ORTHOPEDICS | Facility: CLINIC | Age: 58
End: 2023-08-16
Payer: MEDICAID

## 2023-08-16 DIAGNOSIS — M18.11 ARTHRITIS OF CARPOMETACARPAL (CMC) JOINT OF RIGHT THUMB: ICD-10-CM

## 2023-08-16 DIAGNOSIS — G56.02 CARPAL TUNNEL SYNDROME ON LEFT: Primary | ICD-10-CM

## 2023-08-16 DIAGNOSIS — G56.01 RIGHT CARPAL TUNNEL SYNDROME: ICD-10-CM

## 2023-08-16 DIAGNOSIS — M79.641 CHRONIC PAIN OF RIGHT HAND: ICD-10-CM

## 2023-08-16 DIAGNOSIS — M19.049 OSTEOARTHRITIS OF HAND, UNSPECIFIED LATERALITY, UNSPECIFIED OSTEOARTHRITIS TYPE: ICD-10-CM

## 2023-08-16 DIAGNOSIS — G89.29 CHRONIC PAIN OF RIGHT HAND: ICD-10-CM

## 2023-08-16 PROCEDURE — 99999 PR PBB SHADOW E&M-EST. PATIENT-LVL III: ICD-10-PCS | Mod: PBBFAC,,, | Performed by: ORTHOPAEDIC SURGERY

## 2023-08-16 PROCEDURE — 99213 OFFICE O/P EST LOW 20 MIN: CPT | Mod: S$PBB,,, | Performed by: ORTHOPAEDIC SURGERY

## 2023-08-16 PROCEDURE — 99213 PR OFFICE/OUTPT VISIT, EST, LEVL III, 20-29 MIN: ICD-10-PCS | Mod: S$PBB,,, | Performed by: ORTHOPAEDIC SURGERY

## 2023-08-16 PROCEDURE — 1159F PR MEDICATION LIST DOCUMENTED IN MEDICAL RECORD: ICD-10-PCS | Mod: CPTII,,, | Performed by: ORTHOPAEDIC SURGERY

## 2023-08-16 PROCEDURE — 1159F MED LIST DOCD IN RCRD: CPT | Mod: CPTII,,, | Performed by: ORTHOPAEDIC SURGERY

## 2023-08-16 PROCEDURE — 99999 PR PBB SHADOW E&M-EST. PATIENT-LVL III: CPT | Mod: PBBFAC,,, | Performed by: ORTHOPAEDIC SURGERY

## 2023-08-16 PROCEDURE — 99213 OFFICE O/P EST LOW 20 MIN: CPT | Mod: PBBFAC | Performed by: ORTHOPAEDIC SURGERY

## 2023-08-16 PROCEDURE — 1160F PR REVIEW ALL MEDS BY PRESCRIBER/CLIN PHARMACIST DOCUMENTED: ICD-10-PCS | Mod: CPTII,,, | Performed by: ORTHOPAEDIC SURGERY

## 2023-08-16 PROCEDURE — 1160F RVW MEDS BY RX/DR IN RCRD: CPT | Mod: CPTII,,, | Performed by: ORTHOPAEDIC SURGERY

## 2023-08-16 NOTE — PROGRESS NOTES
Nina Cuba presents for follow up evaluation of   Encounter Diagnosis   Name Primary?    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 Carpal tunnel syndrome).  There is no motor axonal loss.  There is no active denervation.  This is graded as Mild in severity on the right.    This is a normal electrophysiologic study of the left upper extremity.      PE:    AA&O x 4.  NAD  HEENT:  NCAT, sclera nonicteric  Lungs:  Respirations are equal and unlabored.  CV:  2+ bilateral upper and lower extremity pulses.  MSK:  + grind test right basilar thumb, + compression, Tinels and Phalens. Neurovascularly intact bilaterally.  5/5 thenar and intrinsic musculature strength.  Full range of motion hands, wrists and elbows.    A/P: Right carpal tunnel syndrome, right basilar thumb arthritis    We have discussed the natural history of basilar thumb arthritis and carpal tunnel including treatment options such as splinting, oral and topical anti-inflammatories, cortisone injections and surgery. I have given a right thumb 3D brace for comfort.    Follow up as needed for any worsening of symptoms.            Brigid Dominique MD    Please be aware that this note has been generated with the assistance of MModal voice-to-text.  Please excuse any spelling or grammatical errors.

## 2023-09-05 RX ORDER — DICYCLOMINE HYDROCHLORIDE 20 MG/1
20 TABLET ORAL 3 TIMES DAILY PRN
Qty: 90 TABLET | Refills: 3 | Status: SHIPPED | OUTPATIENT
Start: 2023-09-05 | End: 2024-04-02 | Stop reason: SDUPTHER

## 2023-09-05 NOTE — TELEPHONE ENCOUNTER
----- Message from Mihir Roberson MD sent at 9/5/2023  8:23 AM CDT -----  Regarding: RE: rx Refill  Contact: pt @ [phone]  Ok to pend, thanks  ----- Message -----  From: Shannon Skelton MA  Sent: 9/1/2023   4:28 PM CDT  To: Mihir Roberson MD  Subject: FW: rx Refill                                    Let me know if ok to pend Rx. Thanks   ----- Message -----  From: Ian Barcenas  Sent: 9/1/2023   1:25 PM CDT  To: [facility] Staff  Subject: rx Refill                                        Rx Refill/Request Is this a Refill or New Rx: refill    Rx Name and Strength: dicyclomine (BENTYL) 20 mg tablet    Preferred Pharmacy with phone number:   [facility] #[id number] - [place], [place] - [address] [facility] [address] [facility] [address]  Phone: [phone] Fax: [phone]    Communication Preference: call back     Additional Information:    Pt is calling because there was a delay in the pharmacy because there was no approval. Please call to advise if necessary. Thank you for all you are doing.

## 2023-09-18 DIAGNOSIS — F41.9 ANXIETY: ICD-10-CM

## 2023-09-18 RX ORDER — LORAZEPAM 0.5 MG/1
0.5 TABLET ORAL EVERY 12 HOURS PRN
Qty: 30 TABLET | Refills: 1 | Status: SHIPPED | OUTPATIENT
Start: 2023-09-18 | End: 2023-11-02

## 2023-09-18 NOTE — TELEPHONE ENCOUNTER
----- Message from Rafaela Rice sent at 9/18/2023  9:07 AM CDT -----  Contact: [phone]  Requesting an RX refill or new RX.  Is this a refill or new RX: refill  RX name and strength (copy/paste from chart):  LORazepam (ATIVAN) 0.5 MG tablet  Is this a 30 day or 90 day RX: 30 day   Pharmacy name and phone # (copy/paste from chart):    [facility]/pharmacy #[id number] - [place] - [address]  [address] [place] [address]  Phone: [phone] Fax: [phone]  The doctors have asked that we provide their patients with the following 2 reminders -- prescription refills can take up to 72 hours, and a friendly reminder that in the future you can use your [facility] account to request refills: aware

## 2023-09-18 NOTE — TELEPHONE ENCOUNTER
No care due was identified.  [facility] Embedded Care Due Messages. Reference number: [id number].   9/18/2023 2:06:26 PM CDT

## 2023-11-02 DIAGNOSIS — F41.9 ANXIETY: ICD-10-CM

## 2023-11-02 RX ORDER — LORAZEPAM 0.5 MG/1
0.5 TABLET ORAL EVERY 12 HOURS PRN
Qty: 30 TABLET | Refills: 1 | Status: SHIPPED | OUTPATIENT
Start: 2023-11-02 | End: 2024-01-19 | Stop reason: SDUPTHER

## 2023-11-02 NOTE — TELEPHONE ENCOUNTER
No care due was identified.  [facility] Embedded Care Due Messages. Reference number: [id number].   11/02/2023 12:12:39 PM CDT

## 2023-11-14 ENCOUNTER — OFFICE VISIT (OUTPATIENT)
Dept: INTERNAL MEDICINE | Facility: CLINIC | Age: 58
End: 2023-11-14
Payer: COMMERCIAL

## 2023-11-14 VITALS
WEIGHT: 188.38 LBS | SYSTOLIC BLOOD PRESSURE: 101 MMHG | DIASTOLIC BLOOD PRESSURE: 81 MMHG | HEIGHT: 67 IN | RESPIRATION RATE: 18 BRPM | HEART RATE: 79 BPM | BODY MASS INDEX: 29.57 KG/M2

## 2023-11-14 DIAGNOSIS — G56.02 CARPAL TUNNEL SYNDROME ON LEFT: ICD-10-CM

## 2023-11-14 DIAGNOSIS — F41.9 ANXIETY: ICD-10-CM

## 2023-11-14 DIAGNOSIS — K44.9 HIATAL HERNIA: ICD-10-CM

## 2023-11-14 DIAGNOSIS — K21.9 GASTROESOPHAGEAL REFLUX DISEASE, UNSPECIFIED WHETHER ESOPHAGITIS PRESENT: ICD-10-CM

## 2023-11-14 DIAGNOSIS — E66.9 OBESITY (BMI 30-39.9): ICD-10-CM

## 2023-11-14 DIAGNOSIS — Z00.00 ANNUAL PHYSICAL EXAM: Primary | ICD-10-CM

## 2023-11-14 DIAGNOSIS — M18.11 PRIMARY OSTEOARTHRITIS OF FIRST CARPOMETACARPAL JOINT OF RIGHT HAND: ICD-10-CM

## 2023-11-14 PROCEDURE — 99999 PR PBB SHADOW E&M-EST. PATIENT-LVL III: CPT | Mod: PBBFAC,,, | Performed by: INTERNAL MEDICINE

## 2023-11-14 PROCEDURE — 3079F PR MOST RECENT DIASTOLIC BLOOD PRESSURE 80-89 MM HG: ICD-10-PCS | Mod: CPTII,S$GLB,, | Performed by: INTERNAL MEDICINE

## 2023-11-14 PROCEDURE — 1160F RVW MEDS BY RX/DR IN RCRD: CPT | Mod: CPTII,S$GLB,, | Performed by: INTERNAL MEDICINE

## 2023-11-14 PROCEDURE — 99396 PREV VISIT EST AGE 40-64: CPT | Mod: S$GLB,,, | Performed by: INTERNAL MEDICINE

## 2023-11-14 PROCEDURE — 3008F BODY MASS INDEX DOCD: CPT | Mod: CPTII,S$GLB,, | Performed by: INTERNAL MEDICINE

## 2023-11-14 PROCEDURE — 3074F SYST BP LT 130 MM HG: CPT | Mod: CPTII,S$GLB,, | Performed by: INTERNAL MEDICINE

## 2023-11-14 PROCEDURE — 99213 OFFICE O/P EST LOW 20 MIN: CPT | Mod: PBBFAC,PO | Performed by: INTERNAL MEDICINE

## 2023-11-14 PROCEDURE — 1159F MED LIST DOCD IN RCRD: CPT | Mod: CPTII,S$GLB,, | Performed by: INTERNAL MEDICINE

## 2023-11-14 PROCEDURE — 3008F PR BODY MASS INDEX (BMI) DOCUMENTED: ICD-10-PCS | Mod: CPTII,S$GLB,, | Performed by: INTERNAL MEDICINE

## 2023-11-14 PROCEDURE — 99999 PR PBB SHADOW E&M-EST. PATIENT-LVL III: ICD-10-PCS | Mod: PBBFAC,,, | Performed by: INTERNAL MEDICINE

## 2023-11-14 PROCEDURE — 3079F DIAST BP 80-89 MM HG: CPT | Mod: CPTII,S$GLB,, | Performed by: INTERNAL MEDICINE

## 2023-11-14 PROCEDURE — 1160F PR REVIEW ALL MEDS BY PRESCRIBER/CLIN PHARMACIST DOCUMENTED: ICD-10-PCS | Mod: CPTII,S$GLB,, | Performed by: INTERNAL MEDICINE

## 2023-11-14 PROCEDURE — 3074F PR MOST RECENT SYSTOLIC BLOOD PRESSURE < 130 MM HG: ICD-10-PCS | Mod: CPTII,S$GLB,, | Performed by: INTERNAL MEDICINE

## 2023-11-14 PROCEDURE — 1159F PR MEDICATION LIST DOCUMENTED IN MEDICAL RECORD: ICD-10-PCS | Mod: CPTII,S$GLB,, | Performed by: INTERNAL MEDICINE

## 2023-11-14 PROCEDURE — 99396 PR PREVENTIVE VISIT,EST,40-64: ICD-10-PCS | Mod: S$GLB,,, | Performed by: INTERNAL MEDICINE

## 2023-11-14 NOTE — PROGRESS NOTES
Subjective     Patient ID: Nina Cuba is a 58 y.o. female.    Chief Complaint: Annual Exam    HPI  58 y.o. Female here for annual exam.      Vaccines: Influenza (declined); Tetanus (2020)  Eye exam: 6/20  Mammogram: 2/22  Gyn exam: 2022  Colonoscopy: 11/22     Exercise: no  Diet: regular  LMP: menopausal     Past Medical History:  No date: Back problem  No date: GERD (gastroesophageal reflux disease)  No date: Obesity (BMI 30-39.9)  No date: Hiatal hernia  No date: Anxiety  Past Surgical History:  No date: TONSILLECTOMY  Social History    Socioeconomic History      Marital status: Single      Spouse name: Not on file      Number of children: 0      Years of education: Not on file      Highest education level: Not on file    Occupational History      Not on file    Social Needs      Financial resource strain: Not on file      Food insecurity:        Worry: Not on file        Inability: Not on file      Transportation needs:        Medical: Not on file        Non-medical: Not on file    Tobacco Use      Smoking status: Former Smoker      Smokeless tobacco: Never Used    Substance and Sexual Activity      Alcohol use: Yes        Comment: rare      Drug use: Never      Sexual activity: Yes        Partners: Male    Lifestyle      Physical activity:        Days per week: Not on file        Minutes per session: Not on file      Stress: Not on file    Relationships      Social connections:        Talks on phone: Not on file        Gets together: Not on file        Attends Sabianism service: Not on file        Active member of club or organization: Not on file        Attends meetings of clubs or organizations: Not on file        Relationship status: Not on file    Other Topics      Concerns:        Not on file    Social History Narrative      Associate at [employer]      Review of patient's allergies indicates:  No Known Allergies  Review of Systems   Constitutional:  Negative for activity change, appetite  change, chills, diaphoresis, fatigue, fever and unexpected weight change.   HENT:  Negative for postnasal drip, rhinorrhea, sinus pressure/congestion, sneezing, sore throat, trouble swallowing and voice change.    Respiratory:  Negative for cough, shortness of breath and wheezing.    Cardiovascular:  Negative for chest pain, palpitations and leg swelling.   Gastrointestinal:  Negative for abdominal pain, blood in stool, constipation, diarrhea, nausea and vomiting.   Genitourinary:  Negative for dysuria.   Musculoskeletal:  Positive for arthralgias. Negative for myalgias.   Integumentary:  Negative for rash and wound.   Allergic/Immunologic: Negative for environmental allergies and food allergies.   Hematological:  Negative for adenopathy. Does not bruise/bleed easily.          Objective     Physical Exam  Vitals and nursing note reviewed.   Constitutional:       General: She is not in acute distress.     Appearance: Normal appearance. She is well-developed. She is not diaphoretic.   HENT:      Head: Normocephalic and atraumatic.      Right Ear: External ear normal.      Left Ear: External ear normal.      Nose: Nose normal.      Mouth/Throat:      Pharynx: No oropharyngeal exudate.   Eyes:      General: No scleral icterus.        Right eye: No discharge.         Left eye: No discharge.      Conjunctiva/sclera: Conjunctivae normal.      Pupils: Pupils are equal, round, and reactive to light.   Neck:      Thyroid: No thyromegaly.      Vascular: No JVD.   Cardiovascular:      Rate and Rhythm: Normal rate and regular rhythm.      Pulses: Normal pulses.      Heart sounds: Normal heart sounds. No murmur heard.  Pulmonary:      Effort: Pulmonary effort is normal. No respiratory distress.      Breath sounds: Normal breath sounds. No wheezing, rhonchi or rales.   Chest:      Chest wall: No tenderness.   Abdominal:      General: Bowel sounds are normal. There is no distension.      Palpations: Abdomen is soft.       Tenderness: There is no abdominal tenderness. There is no guarding or rebound.   Musculoskeletal:      Cervical back: Neck supple.      Right lower leg: No edema.      Left lower leg: No edema.   Lymphadenopathy:      Cervical: No cervical adenopathy.   Skin:     General: Skin is warm and dry.      Coloration: Skin is not pale.      Findings: No rash.   Neurological:      General: No focal deficit present.      Mental Status: She is alert and oriented to person, place, and time.      Gait: Gait normal.   Psychiatric:         Behavior: Behavior normal.         Thought Content: Thought content normal.         Judgment: Judgment normal.            Assessment and Plan     1. Annual physical exam  -     CBC Auto Differential; Future; Expected date: 11/14/2023  -     Comprehensive Metabolic Panel; Future; Expected date: 11/14/2023  -     TSH; Future; Expected date: 11/14/2023  -     Lipid Panel; Future; Expected date: 11/14/2023  -     Urinalysis; Future  -     Hemoglobin A1C; Future; Expected date: 11/14/2023  -     Ambulatory referral/consult to Obstetrics / Gynecology; Future; Expected date: 11/21/2023    2. Anxiety    3. Obesity (BMI 30-39.9)    4. Gastroesophageal reflux disease, unspecified whether esophagitis present    5. Hiatal hernia    6. Carpal tunnel syndrome on left    7. Primary osteoarthritis of first carpometacarpal joint of right hand       Blood work ordered      Obesity- diet/exercise stressed       GERD/Hiatal hernia- stable on PPI      Anxiety- stable on Ativan PRN      OA hand/CTS- followed by Ortho      F/u in 6 months

## 2023-11-15 ENCOUNTER — TELEPHONE (OUTPATIENT)
Dept: INTERNAL MEDICINE | Facility: CLINIC | Age: 58
End: 2023-11-15
Payer: COMMERCIAL

## 2023-11-15 DIAGNOSIS — E66.9 OBESITY (BMI 30-39.9): Primary | ICD-10-CM

## 2024-01-19 DIAGNOSIS — F41.9 ANXIETY: ICD-10-CM

## 2024-01-19 RX ORDER — LORAZEPAM 0.5 MG/1
0.5 TABLET ORAL EVERY 12 HOURS PRN
Qty: 30 TABLET | Refills: 1 | Status: SHIPPED | OUTPATIENT
Start: 2024-01-19 | End: 2024-03-13 | Stop reason: SDUPTHER

## 2024-01-19 NOTE — TELEPHONE ENCOUNTER
No care due was identified.  [facility] Embedded Care Due Messages. Reference number: [id number].   1/19/2024 12:31:22 PM CST

## 2024-01-19 NOTE — TELEPHONE ENCOUNTER
----- Message from Kourtney Chi sent at 1/19/2024 12:23 PM CST -----  Contact: Pt  [phone]  Requesting an RX refill or new RX.  Is this a refill or new RX Refill :   RX name and strength (copy/paste from chart): LORazepam (ATIVAN) 0.5 MG tablet   Is this a 30 day or 90 day RX:   Pharmacy name and phone # (copy/paste from chart):  [facility]/pharmacy #[id number] - [place] - [address]   Phone: [phone]  Fax: [phone]      The doctors have asked that we provide their patients with the following 2 reminders -- prescription refills can take up to 72 hours, and a friendly reminder that in the future you can use your [facility] account to request refills: yes

## 2024-03-08 ENCOUNTER — TELEPHONE (OUTPATIENT)
Dept: OBSTETRICS AND GYNECOLOGY | Facility: CLINIC | Age: 59
End: 2024-03-08
Payer: COMMERCIAL

## 2024-03-08 DIAGNOSIS — N95.1 MENOPAUSAL SYMPTOMS: ICD-10-CM

## 2024-03-08 RX ORDER — ESTRADIOL 1 MG/1
TABLET ORAL
Qty: 90 TABLET | Refills: 3 | OUTPATIENT
Start: 2024-03-08

## 2024-03-13 DIAGNOSIS — F41.9 ANXIETY: ICD-10-CM

## 2024-03-13 RX ORDER — LORAZEPAM 0.5 MG/1
0.5 TABLET ORAL EVERY 12 HOURS PRN
Qty: 30 TABLET | Refills: 1 | Status: SHIPPED | OUTPATIENT
Start: 2024-03-13 | End: 2024-03-15 | Stop reason: SDUPTHER

## 2024-03-13 NOTE — TELEPHONE ENCOUNTER
No care due was identified.  [facility] Embedded Care Due Messages. Reference number: [id number].   3/13/2024 11:41:02 AM CDT

## 2024-03-13 NOTE — TELEPHONE ENCOUNTER
Refill Routing Note   Medication(s) are not appropriate for processing by [facility] for the following reason(s):        Outside of protocol    [facility] action(s):  Route             Appointments  past 12m or future 3m with PCP    Date Provider   Last Visit   11/14/2023 Tomy Jackson, DO   Next Visit   5/14/2024 Tomy Jackson, DO   ED visits in past 90 days: 0        Note composed:11:43 AM 03/13/2024

## 2024-03-15 DIAGNOSIS — F41.9 ANXIETY: ICD-10-CM

## 2024-03-15 DIAGNOSIS — N95.1 MENOPAUSAL SYMPTOMS: ICD-10-CM

## 2024-03-15 RX ORDER — LORAZEPAM 0.5 MG/1
0.5 TABLET ORAL EVERY 12 HOURS PRN
Qty: 30 TABLET | Refills: 1 | Status: SHIPPED | OUTPATIENT
Start: 2024-03-15 | End: 2024-05-15 | Stop reason: SDUPTHER

## 2024-03-15 NOTE — TELEPHONE ENCOUNTER
----- Message from Perluís Ryan sent at 3/15/2024 10:16 AM CDT -----  Contact: [phone]  Requesting an RX refill or new RX.  Is this a refill or new RX: refill  RX name and strength (copy/paste from chart):  LORazepam (ATIVAN) 0.5 MG tablet  Is this a 30 day or 90 day RX:   Pharmacy name and phone # (copy/paste from chart):        [facility]/pharmacy #[id number] - [place] - [address]  [address] [address]  Phone: [phone] Fax: [phone]     The doctors have asked that we provide their patients with the following 2 reminders -- prescription refills can take up to 72 hours, and a friendly reminder that in the future you can use your [facility] account to request refills: y

## 2024-03-15 NOTE — TELEPHONE ENCOUNTER
Refill Routing Note   Medication(s) are not appropriate for processing by [facility] for the following reason(s):        Outside of protocol    [facility] action(s):  Route             Appointments  past 12m or future 3m with PCP    Date Provider   Last Visit   11/14/2023 Tomy Jackson, DO   Next Visit   5/14/2024 Tomy Jackson, DO   ED visits in past 90 days: 0        Note composed:10:44 AM 03/15/2024

## 2024-03-15 NOTE — TELEPHONE ENCOUNTER
No care due was identified.  [facility] Embedded Care Due Messages. Reference number: [id number].   3/15/2024 10:26:06 AM CDT

## 2024-03-15 NOTE — TELEPHONE ENCOUNTER
----- Message from Elise Castro sent at 3/15/2024 12:52 PM CDT -----  Who Called:  DAGMAR RENTERIA [[id number]]              New Prescription of Refill:  refill        RX Name and Strength:estradioL (ESTRACE) 1 MG tablet                Local or Mail Order:  Local           Preferred Pharmacy:  [facility]/pharmacy #[id number] - [place], [place] - [address]          Would the patient rather a call back or a response via [facility]?          Best call back number:   [phone]        Additional Information:

## 2024-03-17 RX ORDER — ESTRADIOL 1 MG/1
1 TABLET ORAL DAILY
Qty: 30 TABLET | Refills: 0 | Status: SHIPPED | OUTPATIENT
Start: 2024-03-17

## 2024-04-02 RX ORDER — DICYCLOMINE HYDROCHLORIDE 20 MG/1
20 TABLET ORAL 3 TIMES DAILY PRN
Qty: 90 TABLET | Refills: 3 | Status: SHIPPED | OUTPATIENT
Start: 2024-04-02

## 2024-05-01 ENCOUNTER — PATIENT OUTREACH (OUTPATIENT)
Dept: ADMINISTRATIVE | Facility: HOSPITAL | Age: 59
End: 2024-05-01
Payer: COMMERCIAL

## 2024-05-01 NOTE — PROGRESS NOTES
Population Health Chart Review & Patient Outreach Details      Additional [facility] Health Notes:               Updates Requested / Reviewed:      Care Everywhere, , and Immunizations Reconciliation Completed or Queried: [facility] Topics Overdue:      [facility] Score: 2     Cervical Cancer Screening  Mammogram    Shingles/Zoster Vaccine  RSV Vaccine                  Health Maintenance Topic(s) Outreach Outcomes & Actions Taken:    Primary Care Appt - Outreach Outcomes & Actions Taken  : Primary Care Appt Scheduled

## 2024-05-15 ENCOUNTER — OFFICE VISIT (OUTPATIENT)
Dept: INTERNAL MEDICINE | Facility: CLINIC | Age: 59
End: 2024-05-15
Payer: COMMERCIAL

## 2024-05-15 VITALS
RESPIRATION RATE: 16 BRPM | HEIGHT: 67 IN | OXYGEN SATURATION: 98 % | TEMPERATURE: 98 F | HEART RATE: 71 BPM | WEIGHT: 180.75 LBS | BODY MASS INDEX: 28.37 KG/M2 | SYSTOLIC BLOOD PRESSURE: 116 MMHG | DIASTOLIC BLOOD PRESSURE: 78 MMHG

## 2024-05-15 DIAGNOSIS — L23.7 PHYTOPHOTODERMATITIS: ICD-10-CM

## 2024-05-15 DIAGNOSIS — R23.2 HOT FLASHES: ICD-10-CM

## 2024-05-15 DIAGNOSIS — F41.9 ANXIETY: Primary | ICD-10-CM

## 2024-05-15 DIAGNOSIS — K21.9 GASTROESOPHAGEAL REFLUX DISEASE, UNSPECIFIED WHETHER ESOPHAGITIS PRESENT: ICD-10-CM

## 2024-05-15 DIAGNOSIS — K44.9 HIATAL HERNIA: ICD-10-CM

## 2024-05-15 PROBLEM — M19.049 OSTEOARTHRITIS OF HAND: Status: ACTIVE | Noted: 2023-11-14

## 2024-05-15 PROCEDURE — 99214 OFFICE O/P EST MOD 30 MIN: CPT | Mod: S$GLB,,, | Performed by: NURSE PRACTITIONER

## 2024-05-15 PROCEDURE — 1159F MED LIST DOCD IN RCRD: CPT | Mod: CPTII,S$GLB,, | Performed by: NURSE PRACTITIONER

## 2024-05-15 PROCEDURE — 3008F BODY MASS INDEX DOCD: CPT | Mod: CPTII,S$GLB,, | Performed by: NURSE PRACTITIONER

## 2024-05-15 PROCEDURE — 3074F SYST BP LT 130 MM HG: CPT | Mod: CPTII,S$GLB,, | Performed by: NURSE PRACTITIONER

## 2024-05-15 PROCEDURE — 1160F RVW MEDS BY RX/DR IN RCRD: CPT | Mod: CPTII,S$GLB,, | Performed by: NURSE PRACTITIONER

## 2024-05-15 PROCEDURE — 3078F DIAST BP <80 MM HG: CPT | Mod: CPTII,S$GLB,, | Performed by: NURSE PRACTITIONER

## 2024-05-15 PROCEDURE — 99999 PR PBB SHADOW E&M-EST. PATIENT-LVL IV: CPT | Mod: PBBFAC,,, | Performed by: NURSE PRACTITIONER

## 2024-05-15 RX ORDER — LORAZEPAM 0.5 MG/1
0.5 TABLET ORAL EVERY 12 HOURS PRN
Qty: 30 TABLET | Refills: 5 | Status: SHIPPED | OUTPATIENT
Start: 2024-05-15

## 2024-05-15 NOTE — PROGRESS NOTES
Subjective     Patient ID: Nina Cuba is a 59 y.o. female.    Chief Complaint: Follow-up (6 month follow up)    Patient in office for six-month follow up for ongoing management of her anxiety and reflux.  Patient states that she has been taking Ativan 0.5 mg daily as her anxiety has been increased due to her early work schedule.  She also indicates that she stopped taking her hormones progesterone and estradiol 2 months ago.  Patient stated having vaginal bleeding every day despite her negative workup with Gyne.  Since she discontinued medications she has not had any vaginal bleeding but has had hot flashes every other day which are manageable.  Patient is due for a Gynecology evaluation which she plans on making.  Reflux is well controlled on omeprazole.      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   Patient developed welts and blisters on bilateral arms  w/in 48 hours which she has kept clean and covered and today only presents with scarring without any secondary infection. Pt thinks she is doing well overall.       Review of Systems   All other systems reviewed and are negative.     Objective   Physical Exam  Vitals reviewed.   Constitutional:       Appearance: Normal appearance.   HENT:      Head: Normocephalic and atraumatic.   Eyes:      Conjunctiva/sclera: Conjunctivae normal.      Pupils: Pupils are equal, round, and reactive to light.   Cardiovascular:      Rate and Rhythm: Normal rate and regular rhythm.   Pulmonary:      Effort: Pulmonary effort is normal.      Breath sounds: Normal breath sounds.   Abdominal:      General: Bowel sounds are normal.      Palpations: Abdomen is soft.   Musculoskeletal:         General: Normal range of motion.      Cervical back: Normal range of motion and neck supple.   Skin:     General: Skin is warm.      Comments: Hyperpigmentation to bilateral arms and chest   Neurological:      General: No focal deficit  present.   Psychiatric:         Mood and Affect: Mood is anxious.        Assessment and Plan     1. Anxiety  Assessment & Plan:  Stable.   We had a long conversation about long-term use of benzodiazepines.    Patient instructed to use this medication as sparingly as possible as there was a possibility of dependence with long-term use.    Orders:  -     LORazepam (ATIVAN) 0.5 MG tablet; Take 1 tablet (0.5 mg total) by mouth every 12 (twelve) hours as needed for Anxiety.  Dispense: 30 tablet; Refill: 5    2. Gastroesophageal reflux disease, unspecified whether esophagitis present  Assessment & Plan:  Chronic, stable on omeprazole.  No med changes today.      3. Hiatal hernia  Assessment & Plan:  See above.       4. Phytophotodermatitis  Assessment & Plan:  Scarring and hyperpigmentation present no obvious signs and symptoms of secondary infection.  We will continue to monitor.      5. Hot flashes  Assessment & Plan:  Patient encouraged to follow up with gyn to discuss whether or not she should resume progesterone and estrogen.      RTC in 6 months for follow up.

## 2024-05-15 NOTE — ASSESSMENT & PLAN NOTE
Stable.   We had a long conversation about long-term use of benzodiazepines.    Patient instructed to use this medication as sparingly as possible as there was a possibility of dependence with long-term use.

## 2024-05-15 NOTE — ASSESSMENT & PLAN NOTE
Patient encouraged to follow up with gyn to discuss whether or not she should resume progesterone and estrogen.

## 2024-05-15 NOTE — ASSESSMENT & PLAN NOTE
Scarring and hyperpigmentation present no obvious signs and symptoms of secondary infection.  We will continue to monitor.

## 2024-06-02 NOTE — TELEPHONE ENCOUNTER
----- Message from Rebecca Monroy sent at 3/13/2024 11:33 AM CDT -----  Contact: [phone]  Requesting an RX refill or new RX.  Is this a refill or new RX: refill  RX name and strength (copy/paste from chart):  LORazepam (ATIVAN) 0.5 MG tablet  Is this a 30 day or 90 day RX:   Pharmacy name and phone # (copy/paste from chart):      [facility]/pharmacy #[id number] - [place] - [address]  [address] [address]  Phone: [phone] Fax: [phone]      The doctors have asked that we provide their patients with the following 2 reminders -- prescription refills can take up to 72 hours, and a friendly reminder that in the future you can use your [facility] account to request refills: call back       ADL retraining, UE/LE dressing, functional mobility, transfer training

## 2024-08-19 ENCOUNTER — OFFICE VISIT (OUTPATIENT)
Dept: URGENT CARE | Facility: CLINIC | Age: 59
End: 2024-08-19
Payer: COMMERCIAL

## 2024-08-19 VITALS
TEMPERATURE: 100 F | OXYGEN SATURATION: 99 % | WEIGHT: 180 LBS | BODY MASS INDEX: 28.25 KG/M2 | RESPIRATION RATE: 16 BRPM | HEART RATE: 62 BPM | HEIGHT: 67 IN | DIASTOLIC BLOOD PRESSURE: 76 MMHG | SYSTOLIC BLOOD PRESSURE: 110 MMHG

## 2024-08-19 DIAGNOSIS — R50.9 FEVER, UNSPECIFIED FEVER CAUSE: ICD-10-CM

## 2024-08-19 DIAGNOSIS — R10.9 FLANK PAIN: ICD-10-CM

## 2024-08-19 DIAGNOSIS — R30.0 DYSURIA: ICD-10-CM

## 2024-08-19 DIAGNOSIS — R05.9 COUGH, UNSPECIFIED TYPE: ICD-10-CM

## 2024-08-19 DIAGNOSIS — N12 PYELONEPHRITIS: Primary | ICD-10-CM

## 2024-08-19 LAB
BILIRUBIN, UA POC OHS: NEGATIVE
BLOOD, UA POC OHS: NEGATIVE
CLARITY, UA POC OHS: CLEAR
COLOR, UA POC OHS: YELLOW
GLUCOSE, UA POC OHS: NEGATIVE
KETONES, UA POC OHS: NEGATIVE
LEUKOCYTES, UA POC OHS: NEGATIVE
NITRITE, UA POC OHS: NEGATIVE
PH, UA POC OHS: 5
PROTEIN, UA POC OHS: NEGATIVE
SPECIFIC GRAVITY, UA POC OHS: >=1.03
UROBILINOGEN, UA POC OHS: 0.2

## 2024-08-19 PROCEDURE — 87086 URINE CULTURE/COLONY COUNT: CPT | Performed by: NURSE PRACTITIONER

## 2024-08-19 PROCEDURE — 81003 URINALYSIS AUTO W/O SCOPE: CPT | Mod: QW,S$GLB,, | Performed by: NURSE PRACTITIONER

## 2024-08-19 PROCEDURE — 96372 THER/PROPH/DIAG INJ SC/IM: CPT | Mod: S$GLB,,, | Performed by: NURSE PRACTITIONER

## 2024-08-19 PROCEDURE — 99213 OFFICE O/P EST LOW 20 MIN: CPT | Mod: 25,S$GLB,, | Performed by: NURSE PRACTITIONER

## 2024-08-19 RX ORDER — CEFDINIR 300 MG/1
300 CAPSULE ORAL 2 TIMES DAILY
Qty: 20 CAPSULE | Refills: 0 | Status: SHIPPED | OUTPATIENT
Start: 2024-08-19 | End: 2024-08-29

## 2024-08-19 RX ORDER — KETOROLAC TROMETHAMINE 30 MG/ML
30 INJECTION, SOLUTION INTRAMUSCULAR; INTRAVENOUS
Status: COMPLETED | OUTPATIENT
Start: 2024-08-19 | End: 2024-08-19

## 2024-08-19 RX ORDER — CYCLOBENZAPRINE HCL 5 MG
5 TABLET ORAL NIGHTLY
Qty: 7 TABLET | Refills: 0 | Status: SHIPPED | OUTPATIENT
Start: 2024-08-19 | End: 2024-08-26

## 2024-08-19 RX ADMIN — KETOROLAC TROMETHAMINE 30 MG: 30 INJECTION, SOLUTION INTRAMUSCULAR; INTRAVENOUS at 01:08

## 2024-08-19 NOTE — PROGRESS NOTES
"Subjective:      Patient ID: Nina Cuba is a 59 y.o. female.    Vitals:  height is 5' 7" (1.702 m) and weight is 81.6 kg (180 lb). Her oral temperature is 100.2 °F (37.9 °C). Her blood pressure is 110/76 and her pulse is 62. Her respiration is 16 and oxygen saturation is 99%.     Chief Complaint: Flank Pain    This is a 59 y.o. female with a chief complaint of flank pain.    Sx started 4 days ago and are worsening.    Sx include pain in right flank, hip and upper leg. Pt describes pain as burning and throbbing. Frequency and urgency of urination. Dry cough, Sneezing.     Pt has taken tylenol, ibuprofen for sx with mild relief  Provider note begins below    Patient has been under lot of stress.  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  She has had some frequency of urination and right flank pain with fever.  She has been drinking lots of Gatorade.  Denies history of kidney stones.  She has had kidney infection in the past.      Flank Pain  This is a new problem. The current episode started in the past 7 days. The problem occurs constantly. The problem has been gradually worsening since onset. The quality of the pain is described as burning and shooting. The pain radiates to the right thigh. The pain is at a severity of 10/10. The pain is severe. The symptoms are aggravated by standing. Associated symptoms include dysuria, a fever and leg pain. Pertinent negatives include no chest pain. She has tried NSAIDs (tylenol) for the symptoms. The treatment provided mild relief.     Constitution: Positive for fatigue and fever. Negative for sweating.   Cardiovascular:  Negative for chest pain and sob on exertion.   Respiratory:  Positive for cough. Negative for sputum production and shortness of breath.    Gastrointestinal:  Negative for nausea, vomiting, constipation and diarrhea.   Genitourinary:  Positive for dysuria, frequency, urgency and flank pain.      Objective:     Physical Exam   Constitutional: She is " oriented to person, place, and time.   HENT:   Head: Normocephalic and atraumatic.   Cardiovascular: Normal rate.   Pulmonary/Chest: Effort normal. No respiratory distress.   Abdominal: Normal appearance. There is no abdominal tenderness. There is no left CVA tenderness and no right CVA tenderness.   Musculoskeletal:      Lumbar back: She exhibits normal range of motion, no tenderness, no bony tenderness, no swelling, no edema, no deformity, no laceration and no spasm.   Neurological: She is alert and oriented to person, place, and time.   Skin: Skin is warm and dry.   Psychiatric: Her behavior is normal. Mood normal.         Results for orders placed or performed in visit on 08/19/24   POCT Urinalysis(Instrument)   Result Value Ref Range    Color, POC UA Yellow Yellow, Straw, Colorless    Clarity, POC UA Clear Clear    Glucose, POC UA Negative Negative    Bilirubin, POC UA Negative Negative    Ketones, POC UA Negative Negative    Spec Grav POC UA >=1.030 1.005 - 1.030    Blood, POC UA Negative Negative    pH, POC UA 5.0 5.0 - 8.0    Protein, POC UA Negative Negative    Urobilinogen, POC UA 0.2 <=1.0    Nitrite, POC UA Negative Negative    WBC, POC UA Negative Negative      Assessment:     1. Pyelonephritis    2. Dysuria    3. Flank pain    4. Cough, unspecified type    5. Fever, unspecified fever cause        Plan:   UA normal  COVID test Declined  Urine for culture   Fever and flank pain suspect pyelonephritis   ED precautions   Toradol shot in clinic   May alternate Tylenol with ibuprofen and 8 hours   Increase fluids  Flexeril- although does not appear to be muscular      Pyelonephritis    Dysuria  -     POCT Urinalysis(Instrument)  -     Urine culture    Flank pain  -     Urine culture  -     cefdinir (OMNICEF) 300 MG capsule; Take 1 capsule (300 mg total) by mouth 2 (two) times daily. for 10 days  Dispense: 20 capsule; Refill: 0  -     ketorolac injection 30 mg  -     cyclobenzaprine (FLEXERIL) 5 MG tablet;  Take 1 tablet (5 mg total) by mouth nightly. for 7 doses  Dispense: 7 tablet; Refill: 0    Cough, unspecified type  -     Cancel: SARS Coronavirus 2 Antigen, POCT Manual Read    Fever, unspecified fever cause  -     cefdinir (OMNICEF) 300 MG capsule; Take 1 capsule (300 mg total) by mouth 2 (two) times daily. for 10 days  Dispense: 20 capsule; Refill: 0  -     ketorolac injection 30 mg

## 2024-08-19 NOTE — LETTER
August 19, 2024      [facility] [place]  [address]  [facility] [address]  Phone: [phone]  Fax: [phone]       Patient: Nina Cuba   YOB: 1965  Date of Visit: 08/19/2024    To Whom It May Concern:    Alfred Cuba  was at [facility] on 08/19/2024. The patient may return to work/school on 8/21/2024 if fever free for 24 hours with no restrictions. If you have any questions or concerns, or if I can be of further assistance, please do not hesitate to contact me.    Sincerely,    Shazia Chapman, NP

## 2024-08-20 LAB
BACTERIA UR CULT: NORMAL
BACTERIA UR CULT: NORMAL

## 2024-08-29 ENCOUNTER — PATIENT OUTREACH (OUTPATIENT)
Dept: ADMINISTRATIVE | Facility: HOSPITAL | Age: 59
End: 2024-08-29
Payer: COMMERCIAL

## 2024-08-29 DIAGNOSIS — Z12.39 ENCOUNTER FOR SCREENING FOR MALIGNANT NEOPLASM OF BREAST, UNSPECIFIED SCREENING MODALITY: Primary | ICD-10-CM

## 2024-08-29 NOTE — PROGRESS NOTES
Chart review done.  updated. Immunizations reviewed & updated. Care Everywhere updated. Commercial gap report follow up.

## 2024-10-01 ENCOUNTER — TELEPHONE (OUTPATIENT)
Dept: INTERNAL MEDICINE | Facility: CLINIC | Age: 59
End: 2024-10-01
Payer: COMMERCIAL

## 2024-10-01 ENCOUNTER — PATIENT MESSAGE (OUTPATIENT)
Dept: INTERNAL MEDICINE | Facility: CLINIC | Age: 59
End: 2024-10-01
Payer: COMMERCIAL

## 2024-10-01 DIAGNOSIS — F41.9 ANXIETY: ICD-10-CM

## 2024-10-01 RX ORDER — LORAZEPAM 0.5 MG/1
0.5 TABLET ORAL EVERY 12 HOURS PRN
Qty: 30 TABLET | Refills: 5 | Status: SHIPPED | OUTPATIENT
Start: 2024-10-01

## 2024-10-01 NOTE — TELEPHONE ENCOUNTER
----- Message from Maira sent at 10/1/2024  9:11 AM CDT -----  Contact: [phone]  Requesting an RX refill or new RX.    Is this a refill or new RX: refill    RX name and strength (copy/paste from chart):  LORazepam (ATIVAN) 0.5 MG tablet    Is this a 30 day or 90 day RX: 30    Pharmacy name and phone # (copy/paste from chart):      [facility] #[id number] - [place] [address] [address] [facility] [address] [address]  Phone: [phone] Fax: [phone]       The doctors have asked that we provide their patients with the following 2 reminders -- prescription refills can take up to 72 hours, and a friendly reminder that in the future you can use your [facility] account to request refills: yes

## 2024-11-01 ENCOUNTER — LAB VISIT (OUTPATIENT)
Dept: LAB | Facility: HOSPITAL | Age: 59
End: 2024-11-01
Attending: INTERNAL MEDICINE
Payer: COMMERCIAL

## 2024-11-01 DIAGNOSIS — E66.9 OBESITY (BMI 30-39.9): ICD-10-CM

## 2024-11-01 LAB
ALBUMIN SERPL BCP-MCNC: 4 G/DL (ref 3.5–5.2)
ALP SERPL-CCNC: 75 U/L (ref 40–150)
ALT SERPL W/O P-5'-P-CCNC: 19 U/L (ref 10–44)
ANION GAP SERPL CALC-SCNC: 7 MMOL/L (ref 8–16)
AST SERPL-CCNC: 22 U/L (ref 10–40)
BASOPHILS # BLD AUTO: 0.05 K/UL (ref 0–0.2)
BASOPHILS NFR BLD: 0.8 % (ref 0–1.9)
BILIRUB SERPL-MCNC: 0.3 MG/DL (ref 0.1–1)
BUN SERPL-MCNC: 20 MG/DL (ref 8–23)
CALCIUM SERPL-MCNC: 9.6 MG/DL (ref 8.7–10.5)
CHLORIDE SERPL-SCNC: 107 MMOL/L (ref 95–110)
CO2 SERPL-SCNC: 28 MMOL/L (ref 23–29)
CREAT SERPL-MCNC: 0.8 MG/DL (ref 0.5–1.4)
DIFFERENTIAL METHOD BLD: ABNORMAL
EOSINOPHIL # BLD AUTO: 0.2 K/UL (ref 0–0.5)
EOSINOPHIL NFR BLD: 3.2 % (ref 0–8)
ERYTHROCYTE [DISTWIDTH] IN BLOOD BY AUTOMATED COUNT: 12.5 % (ref 11.5–14.5)
EST. GFR  (NO RACE VARIABLE): >60 ML/MIN/1.73 M^2
GLUCOSE SERPL-MCNC: 99 MG/DL (ref 70–110)
HCT VFR BLD AUTO: 41.8 % (ref 37–48.5)
HGB BLD-MCNC: 13.6 G/DL (ref 12–16)
IMM GRANULOCYTES # BLD AUTO: 0.01 K/UL (ref 0–0.04)
IMM GRANULOCYTES NFR BLD AUTO: 0.2 % (ref 0–0.5)
LYMPHOCYTES # BLD AUTO: 2.1 K/UL (ref 1–4.8)
LYMPHOCYTES NFR BLD: 33.5 % (ref 18–48)
MCH RBC QN AUTO: 31.1 PG (ref 27–31)
MCHC RBC AUTO-ENTMCNC: 32.5 G/DL (ref 32–36)
MCV RBC AUTO: 95 FL (ref 82–98)
MONOCYTES # BLD AUTO: 0.4 K/UL (ref 0.3–1)
MONOCYTES NFR BLD: 6.6 % (ref 4–15)
NEUTROPHILS # BLD AUTO: 3.5 K/UL (ref 1.8–7.7)
NEUTROPHILS NFR BLD: 55.7 % (ref 38–73)
NRBC BLD-RTO: 0 /100 WBC
PLATELET # BLD AUTO: 332 K/UL (ref 150–450)
PMV BLD AUTO: 10.6 FL (ref 9.2–12.9)
POTASSIUM SERPL-SCNC: 4.2 MMOL/L (ref 3.5–5.1)
PROT SERPL-MCNC: 7.3 G/DL (ref 6–8.4)
RBC # BLD AUTO: 4.38 M/UL (ref 4–5.4)
SODIUM SERPL-SCNC: 142 MMOL/L (ref 136–145)
WBC # BLD AUTO: 6.32 K/UL (ref 3.9–12.7)

## 2024-11-01 PROCEDURE — 36415 COLL VENOUS BLD VENIPUNCTURE: CPT | Mod: PO | Performed by: INTERNAL MEDICINE

## 2024-11-01 PROCEDURE — 80053 COMPREHEN METABOLIC PANEL: CPT | Performed by: INTERNAL MEDICINE

## 2024-11-01 PROCEDURE — 85025 COMPLETE CBC W/AUTO DIFF WBC: CPT | Performed by: INTERNAL MEDICINE

## 2024-11-15 ENCOUNTER — OFFICE VISIT (OUTPATIENT)
Dept: INTERNAL MEDICINE | Facility: CLINIC | Age: 59
End: 2024-11-15
Payer: COMMERCIAL

## 2024-11-15 VITALS
SYSTOLIC BLOOD PRESSURE: 118 MMHG | WEIGHT: 168 LBS | HEART RATE: 75 BPM | BODY MASS INDEX: 26.37 KG/M2 | DIASTOLIC BLOOD PRESSURE: 68 MMHG | TEMPERATURE: 98 F | OXYGEN SATURATION: 99 % | RESPIRATION RATE: 16 BRPM | HEIGHT: 67 IN

## 2024-11-15 DIAGNOSIS — K21.9 GASTROESOPHAGEAL REFLUX DISEASE, UNSPECIFIED WHETHER ESOPHAGITIS PRESENT: ICD-10-CM

## 2024-11-15 DIAGNOSIS — F41.9 ANXIETY: Primary | ICD-10-CM

## 2024-11-15 DIAGNOSIS — K44.9 HIATAL HERNIA: ICD-10-CM

## 2024-11-15 DIAGNOSIS — J06.9 UPPER RESPIRATORY TRACT INFECTION, UNSPECIFIED TYPE: ICD-10-CM

## 2024-11-15 DIAGNOSIS — G56.02 CARPAL TUNNEL SYNDROME ON LEFT: ICD-10-CM

## 2024-11-15 PROBLEM — E66.9 OBESITY (BMI 30-39.9): Status: RESOLVED | Noted: 2020-05-05 | Resolved: 2024-11-15

## 2024-11-15 PROCEDURE — 3008F BODY MASS INDEX DOCD: CPT | Mod: CPTII,S$GLB,, | Performed by: INTERNAL MEDICINE

## 2024-11-15 PROCEDURE — 3074F SYST BP LT 130 MM HG: CPT | Mod: CPTII,S$GLB,, | Performed by: INTERNAL MEDICINE

## 2024-11-15 PROCEDURE — 1159F MED LIST DOCD IN RCRD: CPT | Mod: CPTII,S$GLB,, | Performed by: INTERNAL MEDICINE

## 2024-11-15 PROCEDURE — G2211 COMPLEX E/M VISIT ADD ON: HCPCS | Mod: S$GLB,,, | Performed by: INTERNAL MEDICINE

## 2024-11-15 PROCEDURE — 99214 OFFICE O/P EST MOD 30 MIN: CPT | Mod: S$GLB,,, | Performed by: INTERNAL MEDICINE

## 2024-11-15 PROCEDURE — 1160F RVW MEDS BY RX/DR IN RCRD: CPT | Mod: CPTII,S$GLB,, | Performed by: INTERNAL MEDICINE

## 2024-11-15 PROCEDURE — 3078F DIAST BP <80 MM HG: CPT | Mod: CPTII,S$GLB,, | Performed by: INTERNAL MEDICINE

## 2024-11-15 PROCEDURE — 99999 PR PBB SHADOW E&M-EST. PATIENT-LVL IV: CPT | Mod: PBBFAC,,, | Performed by: INTERNAL MEDICINE

## 2024-11-15 RX ORDER — ALBUTEROL SULFATE 90 UG/1
2 INHALANT RESPIRATORY (INHALATION) EVERY 6 HOURS PRN
Qty: 18 G | Refills: 0 | Status: SHIPPED | OUTPATIENT
Start: 2024-11-15 | End: 2025-11-15

## 2024-11-15 RX ORDER — OMEPRAZOLE 40 MG/1
40 CAPSULE, DELAYED RELEASE ORAL EVERY MORNING
Qty: 90 CAPSULE | Refills: 3 | Status: SHIPPED | OUTPATIENT
Start: 2024-11-15

## 2024-11-15 RX ORDER — CODEINE PHOSPHATE AND GUAIFENESIN 10; 100 MG/5ML; MG/5ML
5 SOLUTION ORAL EVERY 6 HOURS PRN
Qty: 180 ML | Refills: 0 | Status: SHIPPED | OUTPATIENT
Start: 2024-11-15

## 2024-11-15 NOTE — PROGRESS NOTES
Patient ID: Nina Cbua is a 59 y.o. female.    Chief Complaint: Follow-up (6 month follow up), Sinus Problem, and URI    History of Present Illness    CHIEF COMPLAINT:  Nina presents for a six-month follow-up visit and to discuss symptoms of a persistent respiratory infection.    HPI:  Nina reports respiratory infection symptoms for 2.5 to 3 weeks. Initial symptoms included sore throat, followed by postnasal drip and severe cough. The cough was severe enough to cause wheezing that disturbed sleep and induced vomiting on a few occasions. While symptoms are improving, they are not fully resolved. Persistent symptoms include cough, pulmonary congestion, wheezing, and shortness of breath.    At the onset of the illness, the patient had fever and chills, which have since subsided. Nina has a history of asthma but no longer has an inhaler. Attempts to manage symptoms through breathing exercises were unsuccessful, with noted difficulty breathing, particularly during outdoor activities. Nina had a brief episode of severe dyspnea lasting approximately 1 second.    The respiratory symptoms have impacted the patient's daily life, including work performance. Nina was sent home from work due to the severe cough but is frustrated about work requirements despite illness.    Nina denies current fever and chills.    MEDICATIONS:  Nina is on Lorazepam 0.5 mg, twice daily as needed for anxiety. She is also taking Omeprazole 40 mg daily for gastroesophageal reflux with hiatal hernia.    MEDICAL HISTORY:  Nina has a history of GERD, hiatal hernia, anxiety, osteoarthritis of the hand, carpal tunnel syndrome, and asthma. Nina has not undergone a hysterectomy and reports still having menstrual cycles. She reports needing to schedule a pap smear. Nina previously used progesterone for contraception and hormone replacement therapy, but has discontinued both.    SOCIAL HISTORY:  Nina is currently employed. She reports having a 5-10 hour  shift scheduled after the appointment.         Physical Exam    General: No acute distress. Well-developed. Well-nourished.  Eyes: EOMI. Sclerae anicteric.  HENT: Normocephalic. Atraumatic. Nares patent. Moist oral mucosa.  Ears: Bilateral TMs clear. Bilateral EACs clear.  Cardiovascular: Regular rate. Regular rhythm. No murmurs. No rubs. No gallops. Normal S1, S2.  Respiratory: Normal respiratory effort. Clear to auscultation bilaterally. No rales. No rhonchi. No wheezing.  Abdomen: Soft. Non-tender. Non-distended. Normoactive bowel sounds.  Musculoskeletal: No  obvious deformity.  Extremities: No lower extremity edema.  Neurological: Alert & oriented x3. No slurred speech. Normal gait.  Psychiatric: Normal mood. Normal affect. Good insight. Good judgment.  Skin: Warm. Dry. No rash.         Assessment & Plan    Assessed patient's anxiety and continued lorazepam treatment  Evaluated GERD symptoms and decided to refill omeprazole  Diagnosed viral respiratory infection based on symptoms persisting for 2-3 weeks  Determined airway inflammation likely causing breathing difficulties  Considered albuterol inhaler and cough suppressant to manage respiratory symptoms    UPPER RESPIRATORY TRACT INFECTION:  Educated on the typical duration of viral respiratory infections, which can last up to 6 weeks.  Explained that viruses are highly infectious and can be transmitted through touch or respiratory droplets.  Discussed the importance of hand hygiene in preventing virus transmission.  Started albuterol inhaler as needed for respiratory symptoms.  Started terbutaline TID as needed for cough.    GASTROESOPHAGEAL REFLUX DISEASE WITH HIATAL HERNIA:  Refilled omeprazole 40 mg daily for gastroesophageal reflux with hiatal hernia, provided 90-day supply.    GENERALIZED ANXIETY DISORDER:  Continued lorazepam 0.5 mg BID as needed for anxiety.    GENERAL HEALTH MAINTENANCE:  Nina to continue walking the dog regularly for  exercise.    FOLLOW-UP:  Follow up in 6 months for annual exam.              This note was generated with the assistance of ambient listening technology. Verbal consent was obtained by the patient and accompanying visitor(s) for the recording of patient appointment to facilitate this note. I attest to having reviewed and edited the generated note for accuracy, though some syntax or spelling errors may persist. Please contact the author of this note for any clarification.

## 2024-11-27 ENCOUNTER — TELEPHONE (OUTPATIENT)
Dept: INTERNAL MEDICINE | Facility: CLINIC | Age: 59
End: 2024-11-27
Payer: COMMERCIAL

## 2024-11-27 DIAGNOSIS — F41.9 ANXIETY: ICD-10-CM

## 2024-11-27 DIAGNOSIS — K21.9 GASTROESOPHAGEAL REFLUX DISEASE, UNSPECIFIED WHETHER ESOPHAGITIS PRESENT: ICD-10-CM

## 2024-11-27 DIAGNOSIS — E66.9 OBESITY (BMI 30-39.9): Primary | ICD-10-CM

## 2025-02-26 DIAGNOSIS — F41.9 ANXIETY: ICD-10-CM

## 2025-02-26 RX ORDER — LORAZEPAM 0.5 MG/1
0.5 TABLET ORAL EVERY 12 HOURS PRN
Qty: 30 TABLET | Refills: 5 | Status: SHIPPED | OUTPATIENT
Start: 2025-02-26

## 2025-02-26 NOTE — TELEPHONE ENCOUNTER
No care due was identified.  [facility] Embedded Care Due Messages. Reference number: [id number].   2/26/2025 8:02:57 AM CST

## 2025-04-02 ENCOUNTER — PATIENT OUTREACH (OUTPATIENT)
Dept: ADMINISTRATIVE | Facility: HOSPITAL | Age: 60
End: 2025-04-02
Payer: COMMERCIAL

## 2025-04-17 DIAGNOSIS — J06.9 UPPER RESPIRATORY TRACT INFECTION, UNSPECIFIED TYPE: ICD-10-CM

## 2025-04-17 RX ORDER — CODEINE PHOSPHATE AND GUAIFENESIN 10; 100 MG/5ML; MG/5ML
5 SOLUTION ORAL EVERY 6 HOURS PRN
Qty: 180 ML | Refills: 0 | Status: SHIPPED | OUTPATIENT
Start: 2025-04-17

## 2025-04-17 NOTE — TELEPHONE ENCOUNTER
Refill Routing Note   Medication(s) are not appropriate for processing by [facility] for the following reason(s):        Outside of protocol    [facility] action(s):  Route               Appointments  past 12m or future 3m with PCP    Date Provider   Last Visit   11/15/2024 Tomy Jackson, DO   Next Visit   5/13/2025 Tomy Jackson, DO   ED visits in past 90 days: 0        Note composed:12:17 PM 04/17/2025

## 2025-05-10 ENCOUNTER — HOSPITAL ENCOUNTER (EMERGENCY)
Facility: HOSPITAL | Age: 60
Discharge: HOME | End: 2025-05-10
Attending: STUDENT IN AN ORGANIZED HEALTH CARE EDUCATION/TRAINING PROGRAM

## 2025-05-10 VITALS
TEMPERATURE: 99 F | RESPIRATION RATE: 18 BRPM | OXYGEN SATURATION: 97 % | HEART RATE: 52 BPM | DIASTOLIC BLOOD PRESSURE: 62 MMHG | WEIGHT: 168 LBS | HEIGHT: 67 IN | BODY MASS INDEX: 26.37 KG/M2 | SYSTOLIC BLOOD PRESSURE: 106 MMHG

## 2025-05-10 DIAGNOSIS — S19.9XXA INJURY OF NECK, INITIAL ENCOUNTER: Primary | ICD-10-CM

## 2025-05-10 PROCEDURE — 63600175 PHARM REV CODE 636 W HCPCS: Performed by: STUDENT IN AN ORGANIZED HEALTH CARE EDUCATION/TRAINING PROGRAM

## 2025-05-10 PROCEDURE — 25000003 PHARM REV CODE 250: Performed by: STUDENT IN AN ORGANIZED HEALTH CARE EDUCATION/TRAINING PROGRAM

## 2025-05-10 PROCEDURE — 96374 THER/PROPH/DIAG INJ IV PUSH: CPT

## 2025-05-10 PROCEDURE — 99285 EMERGENCY DEPT VISIT HI MDM: CPT | Mod: 25

## 2025-05-10 RX ORDER — IBUPROFEN 600 MG/1
600 TABLET, FILM COATED ORAL EVERY 6 HOURS PRN
Qty: 20 TABLET | Refills: 0 | Status: SHIPPED | OUTPATIENT
Start: 2025-05-10

## 2025-05-10 RX ORDER — MORPHINE SULFATE 4 MG/ML
4 INJECTION, SOLUTION INTRAMUSCULAR; INTRAVENOUS
Status: COMPLETED | OUTPATIENT
Start: 2025-05-10 | End: 2025-05-10

## 2025-05-10 RX ORDER — HYDROXYZINE HYDROCHLORIDE 10 MG/1
10 TABLET, FILM COATED ORAL
Status: COMPLETED | OUTPATIENT
Start: 2025-05-10 | End: 2025-05-10

## 2025-05-10 RX ORDER — TIZANIDINE 2 MG/1
4 TABLET ORAL EVERY 8 HOURS PRN
Qty: 8 TABLET | Refills: 0 | Status: SHIPPED | OUTPATIENT
Start: 2025-05-10 | End: 2025-05-20

## 2025-05-10 RX ADMIN — MORPHINE SULFATE 4 MG: 4 INJECTION INTRAVENOUS at 04:05

## 2025-05-10 RX ADMIN — HYDROXYZINE HYDROCHLORIDE 10 MG: 10 TABLET ORAL at 04:05

## 2025-05-10 NOTE — ED PROVIDER NOTES
Encounter Date: 5/10/2025       History     Chief Complaint   Patient presents with    Neck Pain     At work pulling a rug down from top shelf and the rack fell on her neck. 10/10 neck and R shoulder pain. Denies LOC. Arrives in c collar.      HPI  Nina Cuba is a 60 year old female with past medical history significant for anxiety, GERD, hiatal hernia is presenting with a neck injury.    Patient reports that she works at [employer].  She was trying to get a rug for a customer.  Says that the mechanism that was holding up the rug was stock and they pulled on the rug to bring it down.  States that the metal arm that is suspended the kiet came down and struck her on the back of her neck.  She is unsure if she got hit in the head.  She denies any loss of consciousness.  Says that she immediately felt shaky and unwell.  Currently she still feels anxious and endorses neck pain.  She has not no other acute concerns at this time.  Review of patient's allergies indicates:  No Known Allergies  Past Medical History:   Diagnosis Date    Anxiety     Back problem     GERD (gastroesophageal reflux disease)     Hiatal hernia     Obesity (BMI 30-39.9)      Past Surgical History:   Procedure Laterality Date    COLONOSCOPY N/A 11/3/2022    Procedure: COLONOSCOPY;  Surgeon: Mihir Roberson MD;  Location: [address]);  Service: Endoscopy;  Laterality: N/A;    ESOPHAGOGASTRODUODENOSCOPY N/A 11/3/2022    Procedure: EGD (ESOPHAGOGASTRODUODENOSCOPY);  Surgeon: Mihir Roberson MD;  Location: [address]);  Service: Endoscopy;  Laterality: N/A;  rapid-inst email am prep-tb-order re-created from  order-ray only  pre call done, no answer  verified arrival time 9am    TONSILLECTOMY       Family History   Problem Relation Name Age of Onset    Diabetes Mother      Hypertension Mother      Lymphoma Father      Kidney cancer Brother      Heart disease Maternal Grandfather      Stroke Neg Hx       Social History[1]  Review of  Systems    Physical Exam     Initial Vitals [05/10/25 1512]   BP Pulse Resp Temp SpO2   138/70 110 14 98.8 °F (37.1 °C) 99 %      MAP       --         Physical Exam    Nursing note and vitals reviewed.  Constitutional: She appears well-developed and well-nourished.   C collar in place   Eyes: Conjunctivae and EOM are normal. Pupils are equal, round, and reactive to light.   Cardiovascular:  Normal rate, regular rhythm, normal heart sounds and intact distal pulses.           No murmur heard.  Pulmonary/Chest: Breath sounds normal. No respiratory distress. She has no wheezes. She has no rhonchi. She has no rales.   Abdominal: Abdomen is soft. Bowel sounds are normal. She exhibits no distension. There is no abdominal tenderness.   Musculoskeletal:      Comments: Midline c spine tenderness. No midline t or l spine tenderness.     Neurological: She is alert.   Skin: Skin is warm. Capillary refill takes less than 2 seconds.   Psychiatric:   anxious         ED Course   Procedures  Labs Reviewed - No data to display       Imaging Results              CT Cervical Spine Without Contrast (Final result)  Result time 05/10/25 18:10:11      Final result by Andrey Casey MD (05/10/25 18:10:11)                   Impression:      No acute intracranial abnormality.    No traumatic malalignment or fracture of the cervical spine.    Electronically signed by resident: Raghavendra Trinh  Date:    05/10/2025  Time:    17:34    Electronically signed by: Andrey Casey MD  Date:    05/10/2025  Time:    18:10               Narrative:    EXAMINATION:  CT HEAD WITHOUT CONTRAST; CT CERVICAL SPINE WITHOUT CONTRAST    CLINICAL HISTORY:  Head trauma, moderate-severe;; Neck trauma, midline tenderness (Age 16-64y);    TECHNIQUE:  Low dose axial CT images obtained throughout the head as well as the cervical spine without intravenous contrast. Sagittal and coronal reconstructions were performed.    COMPARISON:  None.    FINDINGS:  Head:    Intracranial  compartment:    Ventricles and sulci are normal in size for age without evidence of hydrocephalus. No extra-axial blood or fluid collections.    No acute parenchymal abnormality.  No parenchymal hemorrhage, edema or major vascular distribution infarct.    Skull/extracranial contents (limited evaluation): No fracture. Patchy paranasal sinus membrane thickening.  Partially imaged left maxillary apical lucency with mucosal thickening of the left maxillary sinus.  Left mastoid effusion.  Partial right mastoid effusion.    Cervical spine:    The predental space is maintained.  Mild atlantodental degenerative change.    Loss of the normal cervical lordosis.    The vertebral bodies are normal in height and morphology without evidence of fracture or osseous destructive process.  C5 limbus vertebra versus a chronic fracture noted.  Bone island in the posterior vertebral body of T2.    Multilevel degenerative changes of the spine including posterior disc osteophyte complexes, uncovertebral spurring, and facet arthropathy.  Findings contribute to multilevel mild spinal canal stenosis.  Moderate bilateral neural foraminal narrowing at C5-C6 is noted.  Moderate neural foraminal narrowing also noted at the left C4-C5 level.    Limited evaluation of the intraspinal contents demonstrates no hematoma or mass.Paraspinal soft tissues exhibit no acute abnormalities. No acute abnormality within the imaged portions of the lungs.                                       CT Head Without Contrast (Final result)  Result time 05/10/25 18:10:11      Final result by Andrey Casey MD (05/10/25 18:10:11)                   Impression:      No acute intracranial abnormality.    No traumatic malalignment or fracture of the cervical spine.    Electronically signed by resident: Raghavendra Trinh  Date:    05/10/2025  Time:    17:34    Electronically signed by: Andrey Casey MD  Date:    05/10/2025  Time:    18:10               Narrative:     EXAMINATION:  CT HEAD WITHOUT CONTRAST; CT CERVICAL SPINE WITHOUT CONTRAST    CLINICAL HISTORY:  Head trauma, moderate-severe;; Neck trauma, midline tenderness (Age 16-64y);    TECHNIQUE:  Low dose axial CT images obtained throughout the head as well as the cervical spine without intravenous contrast. Sagittal and coronal reconstructions were performed.    COMPARISON:  None.    FINDINGS:  Head:    Intracranial compartment:    Ventricles and sulci are normal in size for age without evidence of hydrocephalus. No extra-axial blood or fluid collections.    No acute parenchymal abnormality.  No parenchymal hemorrhage, edema or major vascular distribution infarct.    Skull/extracranial contents (limited evaluation): No fracture. Patchy paranasal sinus membrane thickening.  Partially imaged left maxillary apical lucency with mucosal thickening of the left maxillary sinus.  Left mastoid effusion.  Partial right mastoid effusion.    Cervical spine:    The predental space is maintained.  Mild atlantodental degenerative change.    Loss of the normal cervical lordosis.    The vertebral bodies are normal in height and morphology without evidence of fracture or osseous destructive process.  C5 limbus vertebra versus a chronic fracture noted.  Bone island in the posterior vertebral body of T2.    Multilevel degenerative changes of the spine including posterior disc osteophyte complexes, uncovertebral spurring, and facet arthropathy.  Findings contribute to multilevel mild spinal canal stenosis.  Moderate bilateral neural foraminal narrowing at C5-C6 is noted.  Moderate neural foraminal narrowing also noted at the left C4-C5 level.    Limited evaluation of the intraspinal contents demonstrates no hematoma or mass.Paraspinal soft tissues exhibit no acute abnormalities. No acute abnormality within the imaged portions of the lungs.                                       Medications   hydrOXYzine HCL tablet 10 mg (10 mg Oral Given  5/10/25 1647)   morphine injection 4 mg (4 mg Intravenous Given 5/10/25 1647)     Medical Decision Making  Nina Cuba is a 60 year old female with past medical history significant for anxiety, GERD, hiatal hernia is presenting with a neck injury.  On arrival patient is hemodynamically stable.  She appears anxious on exam she is in a C-collar and she has midline tenderness in her C-spine but not in her to your L-spine.  Differentials at this time include contusion, fracture, dislocation.  Plan to obtain CT head and C-spine.  CT head and C-spine are normal.  At this time I recommend discharge home with supportive cares and tizanidine.  Recommend follow up with PCP as needed.  Return precautions provided.    Amount and/or Complexity of Data Reviewed  Radiology: ordered. Decision-making details documented in ED Course.    Risk  Prescription drug management.               ED Course as of 05/10/25 2359   Sat May 10, 2025   1733 CT Head Without Contrast  Independent interpretation no acute intracranial findings. [AC]   1734 CT Cervical Spine Without Contrast  Independent interpretation signs of degenerative disc disease but no acute fracture [AC]   1913 CT Cervical Spine Without Contrast  No acute intracranial abnormality.     No traumatic malalignment or fracture of the cervical spine.      [AC]      ED Course User Index  [AC] Chio Adam MD                           Clinical Impression:  Final diagnoses:  [S19.9XXA] Injury of neck, initial encounter (Primary)          ED Disposition Condition    Discharge Stable          ED Prescriptions       Medication Sig Dispense Start Date End Date Auth. Provider    tiZANidine (ZANAFLEX) 2 MG tablet Take 2 tablets (4 mg total) by mouth every 8 (eight) hours as needed. 8 tablet 5/10/2025 5/20/2025 Chio Adam MD    ibuprofen (ADVIL,MOTRIN) 600 MG tablet Take 1 tablet (600 mg total) by mouth every 6 (six) hours as needed for Pain. 20 tablet 5/10/2025 -- Kia  MD Chio          Follow-up Information       Follow up With Specialties Details Why Contact Info    Tmoy Jackson, DO Internal Medicine   [address] [facility] [address]  [phone]      [facility] - Emergency Dept Emergency Medicine   [address] [facility] [address] [phone]                 [1]   Social History  Tobacco Use    Smoking status: Former     Passive exposure: Never    Smokeless tobacco: Never   Substance Use Topics    Alcohol use: Yes     Comment: rare    Drug use: Never        Chio Adam MD  Resident  05/10/25 6029

## 2025-05-10 NOTE — ED TRIAGE NOTES
Nina Mock Bereket, a 60 y.o. female presents to the ED w/ complaint of neck pain/stiffness/tingling. Pt reports being at work when rug fell from top shelf and rack fell on her head/neck. Pt reports 8/10 occipital headache, right shoulder . Aao4, arrives with C collar in place.

## 2025-05-10 NOTE — Clinical Note
"Nina Mcmullenrenny Cuba was seen and treated in our emergency department on 5/10/2025.  She may return to work on 05/13/2025.       If you have any questions or concerns, please don't hesitate to call.      Chio Adam MD"

## 2025-05-11 NOTE — DISCHARGE INSTRUCTIONS
You were seen in the emergency department for your neck injury. You do not have any broken bones or bleeding in your brain. You will likely be sore for the next few days. You can use heating pads, ice, ibuprofen or tylenol as needed for your pain. Use leslie stretches as tolerated. Follow up with your primary care doctor for further management.    Please return to the emergency department if you develop weakness or numbness, fainting, or any new concerns.

## 2025-05-12 ENCOUNTER — TELEPHONE (OUTPATIENT)
Dept: URGENT CARE | Facility: CLINIC | Age: 60
End: 2025-05-12
Payer: COMMERCIAL

## 2025-05-12 NOTE — TELEPHONE ENCOUNTER
# [id number]  is 1965      Elinor Lyons   [employer]         [address]  [place]  [address]  Fax:  [phone]  Phone:  [phone]   [employer]    [phone]    [email or web address] IncHangzhou Huato SoftwareMountain Point Medical Center

## 2025-05-13 ENCOUNTER — OFFICE VISIT (OUTPATIENT)
Dept: INTERNAL MEDICINE | Facility: CLINIC | Age: 60
End: 2025-05-13
Payer: COMMERCIAL

## 2025-05-13 ENCOUNTER — OFFICE VISIT (OUTPATIENT)
Dept: URGENT CARE | Facility: CLINIC | Age: 60
End: 2025-05-13
Payer: OTHER MISCELLANEOUS

## 2025-05-13 VITALS
BODY MASS INDEX: 27.55 KG/M2 | RESPIRATION RATE: 22 BRPM | WEIGHT: 175.5 LBS | TEMPERATURE: 97 F | HEART RATE: 65 BPM | HEIGHT: 67 IN | SYSTOLIC BLOOD PRESSURE: 114 MMHG | OXYGEN SATURATION: 98 % | DIASTOLIC BLOOD PRESSURE: 80 MMHG

## 2025-05-13 VITALS
HEIGHT: 67 IN | BODY MASS INDEX: 26.37 KG/M2 | DIASTOLIC BLOOD PRESSURE: 74 MMHG | RESPIRATION RATE: 16 BRPM | WEIGHT: 168 LBS | HEART RATE: 64 BPM | OXYGEN SATURATION: 98 % | SYSTOLIC BLOOD PRESSURE: 109 MMHG

## 2025-05-13 DIAGNOSIS — F41.9 ANXIETY: Primary | ICD-10-CM

## 2025-05-13 DIAGNOSIS — Z02.6 ENCOUNTER RELATED TO WORKER'S COMPENSATION CLAIM: Primary | ICD-10-CM
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
## 2025-05-13 DIAGNOSIS — R13.19 ESOPHAGEAL DYSPHAGIA: ICD-10-CM

## 2025-05-13 DIAGNOSIS — M19.049 PRIMARY OSTEOARTHRITIS OF HAND, UNSPECIFIED LATERALITY: ICD-10-CM

## 2025-05-13 DIAGNOSIS — R10.9 ABDOMINAL SPASMS: ICD-10-CM

## 2025-05-13 DIAGNOSIS — K21.9 GASTROESOPHAGEAL REFLUX DISEASE, UNSPECIFIED WHETHER ESOPHAGITIS PRESENT: ICD-10-CM

## 2025-05-13 DIAGNOSIS — Z00.00 ANNUAL PHYSICAL EXAM: ICD-10-CM

## 2025-05-13 DIAGNOSIS — S16.1XXA CERVICAL MYOFASCIAL STRAIN, INITIAL ENCOUNTER: ICD-10-CM

## 2025-05-13 DIAGNOSIS — K44.9 HIATAL HERNIA: ICD-10-CM

## 2025-05-13 PROCEDURE — 99999 PR PBB SHADOW E&M-EST. PATIENT-LVL V: CPT | Mod: PBBFAC,,, | Performed by: INTERNAL MEDICINE

## 2025-05-13 RX ORDER — METHOCARBAMOL 500 MG/1
500 TABLET, FILM COATED ORAL 3 TIMES DAILY
Qty: 30 TABLET | Refills: 0 | Status: SHIPPED | OUTPATIENT
Start: 2025-05-13 | End: 2025-05-23

## 2025-05-13 RX ORDER — NAPROXEN 500 MG/1
500 TABLET ORAL 2 TIMES DAILY
Qty: 20 TABLET | Refills: 0 | Status: SHIPPED | OUTPATIENT
Start: 2025-05-14 | End: 2025-05-24

## 2025-05-13 RX ORDER — DICYCLOMINE HYDROCHLORIDE 20 MG/1
20 TABLET ORAL 3 TIMES DAILY PRN
Qty: 90 TABLET | Refills: 3 | Status: SHIPPED | OUTPATIENT
Start: 2025-05-13

## 2025-05-13 RX ORDER — KETOROLAC TROMETHAMINE 30 MG/ML
30 INJECTION, SOLUTION INTRAMUSCULAR; INTRAVENOUS
Status: COMPLETED | OUTPATIENT
Start: 2025-05-13 | End: 2025-05-13

## 2025-05-13 RX ADMIN — KETOROLAC TROMETHAMINE 30 MG: 30 INJECTION, SOLUTION INTRAMUSCULAR; INTRAVENOUS at 12:05

## 2025-05-13 NOTE — PROGRESS NOTES
Subjective:      Patient ID: Nina Cuba is a 60 y.o. female.    Chief Complaint: Neck Injury    Patient's place of employment - [employer]  Patient's job title - Coordinator  Date of injury - 05/10/25  Body part injured including left or right - Neck  Injury Mechanism - [employer]  What they were doing when they got hurt - Metal Rug Rack Fell on Patients Neck  What they did immediately after - 911 was called and rushed to hospital   Pain scale right now - 9/10      Neck Injury   This is a new problem. The current episode started today. The problem occurs constantly. The problem has been unchanged. The quality of the pain is described as aching, burning, cramping, shooting and stabbing (throbbing,sharp). The pain is at a severity of 9/10. The pain is severe. The symptoms are aggravated by bending, position and twisting. The pain is Same all the time. Stiffness is present All day. Associated symptoms include headaches, numbness and tingling. Associated symptoms comments: Nausea. She has tried bed rest and heat for the symptoms.       Neurological:  Positive for headaches and numbness.     Reviewed MA note above, provider not to follow  61 y/o female with a chief compliant of neck pain. She works at home [employer], 3 days ago was pulling a carpet off or a rack and part of the rack and carpet fell directly onto her neck. She is not sure if she hit her head but did not lose consciousness. She was seen in the ED that day and a CT of the head and cervical spine was performed, no significant findings or fractures. Today reports her neck is very stiff and still painful. She was given zanaflex by the ED but only a few days worth they were helping with pain but she has taken all of them. Initially c./o numbness but upon further questioning she is having an intermittent tingling sensation in posterior neck radiating down arms, she is not experiencing today. Denies any previous neck or back trauma, surgery. Denies  any weakness. Denies any other injuries other than listed above.   Objective:     Physical Exam  Vitals and nursing note reviewed.   Constitutional:       Appearance: Normal appearance.   HENT:      Head: Normocephalic.      Nose: Nose normal.   Eyes:      Extraocular Movements: Extraocular movements intact.      Conjunctiva/sclera: Conjunctivae normal.   Neck:        Comments: No focal cervical spinal tenderness but does have TTP throughout B sternocleidomastoid as well as trapezius. No ecchymosis or edema. Able to flex extend neck but pain with rotation in either direction worse to the left  Cardiovascular:      Rate and Rhythm: Normal rate.      Pulses: Normal pulses.      Heart sounds: Normal heart sounds.   Pulmonary:      Effort: Pulmonary effort is normal.      Breath sounds: Normal breath sounds.   Musculoskeletal:      Cervical back: Rigidity present. No edema or crepitus. Pain with movement and muscular tenderness present. No spinous process tenderness.   Skin:     General: Skin is warm.      Capillary Refill: Capillary refill takes less than 2 seconds.      Coloration: Skin is not pale.   Neurological:      General: No focal deficit present.      Mental Status: She is alert and oriented to person, place, and time.      GCS: GCS eye subscore is 4. GCS verbal subscore is 5. GCS motor subscore is 6.      Cranial Nerves: Cranial nerves 2-12 are intact.      Sensory: Sensation is intact.      Motor: Motor function is intact.      Coordination: Coordination is intact.   Psychiatric:         Attention and Perception: Attention normal.         Mood and Affect: Mood normal.         Speech: Speech normal.         Behavior: Behavior normal. Behavior is cooperative.         Thought Content: Thought content normal.         Judgment: Judgment normal.      CT Head Without Contrast  Result Date: 5/10/2025  EXAMINATION: CT HEAD WITHOUT CONTRAST; CT CERVICAL SPINE WITHOUT CONTRAST CLINICAL HISTORY: Head trauma,  moderate-severe;; Neck trauma, midline tenderness (Age 16-64y); TECHNIQUE: Low dose axial CT images obtained throughout the head as well as the cervical spine without intravenous contrast. Sagittal and coronal reconstructions were performed. COMPARISON: None. FINDINGS: Head: Intracranial compartment: Ventricles and sulci are normal in size for age without evidence of hydrocephalus. No extra-axial blood or fluid collections. No acute parenchymal abnormality.  No parenchymal hemorrhage, edema or major vascular distribution infarct. Skull/extracranial contents (limited evaluation): No fracture. Patchy paranasal sinus membrane thickening.  Partially imaged left maxillary apical lucency with mucosal thickening of the left maxillary sinus.  Left mastoid effusion.  Partial right mastoid effusion. Cervical spine: The predental space is maintained.  Mild atlantodental degenerative change. Loss of the normal cervical lordosis. The vertebral bodies are normal in height and morphology without evidence of fracture or osseous destructive process.  C5 limbus vertebra versus a chronic fracture noted.  Bone island in the posterior vertebral body of T2. Multilevel degenerative changes of the spine including posterior disc osteophyte complexes, uncovertebral spurring, and facet arthropathy.  Findings contribute to multilevel mild spinal canal stenosis.  Moderate bilateral neural foraminal narrowing at C5-C6 is noted.  Moderate neural foraminal narrowing also noted at the left C4-C5 level. Limited evaluation of the intraspinal contents demonstrates no hematoma or mass.Paraspinal soft tissues exhibit no acute abnormalities. No acute abnormality within the imaged portions of the lungs.     No acute intracranial abnormality. No traumatic malalignment or fracture of the cervical spine. Electronically signed by resident: Raghavendra Trinh Date:    05/10/2025 Time:    17:34 Electronically signed by: Andrey Casey MD Date:    05/10/2025  Time:    18:10    CT Cervical Spine Without Contrast  Result Date: 5/10/2025  EXAMINATION: CT HEAD WITHOUT CONTRAST; CT CERVICAL SPINE WITHOUT CONTRAST CLINICAL HISTORY: Head trauma, moderate-severe;; Neck trauma, midline tenderness (Age 16-64y); TECHNIQUE: Low dose axial CT images obtained throughout the head as well as the cervical spine without intravenous contrast. Sagittal and coronal reconstructions were performed. COMPARISON: None. FINDINGS: Head: Intracranial compartment: Ventricles and sulci are normal in size for age without evidence of hydrocephalus. No extra-axial blood or fluid collections. No acute parenchymal abnormality.  No parenchymal hemorrhage, edema or major vascular distribution infarct. Skull/extracranial contents (limited evaluation): No fracture. Patchy paranasal sinus membrane thickening.  Partially imaged left maxillary apical lucency with mucosal thickening of the left maxillary sinus.  Left mastoid effusion.  Partial right mastoid effusion. Cervical spine: The predental space is maintained.  Mild atlantodental degenerative change. Loss of the normal cervical lordosis. The vertebral bodies are normal in height and morphology without evidence of fracture or osseous destructive process.  C5 limbus vertebra versus a chronic fracture noted.  Bone island in the posterior vertebral body of T2. Multilevel degenerative changes of the spine including posterior disc osteophyte complexes, uncovertebral spurring, and facet arthropathy.  Findings contribute to multilevel mild spinal canal stenosis.  Moderate bilateral neural foraminal narrowing at C5-C6 is noted.  Moderate neural foraminal narrowing also noted at the left C4-C5 level. Limited evaluation of the intraspinal contents demonstrates no hematoma or mass.Paraspinal soft tissues exhibit no acute abnormalities. No acute abnormality within the imaged portions of the lungs.     No acute intracranial abnormality. No traumatic malalignment or  fracture of the cervical spine. Electronically signed by resident: Raghavendra Trinh Date:    05/10/2025 Time:    17:34 Electronically signed by: Andrey Casey MD Date:    05/10/2025 Time:    18:10      Assessment:      1. Encounter related to worker's compensation claim    2. Fall, initial encounter    3. Cervical myofascial strain, initial encounter      Plan:       Medications Ordered This Encounter   Medications    ketorolac injection 30 mg    methocarbamoL (ROBAXIN) 500 MG Tab     Sig: Take 1 tablet (500 mg total) by mouth 3 (three) times daily. for 10 days     Dispense:  30 tablet     Refill:  0    naproxen (NAPROSYN) 500 MG tablet     Sig: Take 1 tablet (500 mg total) by mouth 2 (two) times daily. for 10 days     Dispense:  20 tablet     Refill:  0     Patient Instructions: Attention not to aggravate affected area, Daily home exercises/warm soaks, Apply ice 24-48 hours then apply heat/warm soaks   Restrictions: Avoid frequent bending/lifting/twisting, Avoid climbing/kneeling/squatting, No lifting/pushing/pulling more than 10 lbs, No Prolonged standing/walking  Follow up in about 2 years (around 5/13/2027).  Diagnoses and plan discussed with the patient, as well as the expected course and duration of symptoms. Risks and benefits of any medication prescribed during this visit was explained, verbal instructions on use given. Clinic/Emergency department return precautions were given, can return to [facility] before scheduled follow-up appointment if notes worsening/aggravation of symptoms. All questions and concerns were addressed prior to discharge. Plan was developed with active input from the patient and they verbalized understanding of and agreement with the POC.

## 2025-05-13 NOTE — LETTER
[facility]  [address] [address]  Phone: [phone]  Fax: [phone]  [facility]: [id number]     Name: Nina Cuba  Injury Date: 05/10/2025   Employee ID: [id number] Date of Treatment: 05/13/2025   Company: [employer]      Appointment Time:  Arrived: 12:00 PM   Provider: FADY Crespo Time Out:1:19 PM      Office Treatment:   1. Encounter related to worker's compensation claim      Medications Ordered This Encounter   Medications    ketorolac injection 30 mg    methocarbamoL (ROBAXIN) 500 MG Tab    naproxen (NAPROSYN) 500 MG tablet        Patient Instructions: Attention not to aggravate affected area, Daily home exercises/warm soaks, Apply ice 24-48 hours then apply heat/warm soaks      Restrictions: Avoid frequent bending/lifting/twisting, Avoid climbing/kneeling/squatting, No lifting/pushing/pulling more than 10 lbs, No Prolonged standing/walking     Return Appointment: 5/27/2025 at 2:00 PM KAUSHIK

## 2025-05-13 NOTE — PROGRESS NOTES
Subjective     Patient ID: Nina Cuba is a 60 y.o. female.    Chief Complaint: Annual Exam and GI Problem    HPI  60 y.o. Female here for annual exam.     GASTROINTESTINAL:  She reports worsening stomach problems over the last couple months, though not occurring daily. She experiences reflux, abdominal pain, and dysphagia with food getting stuck in chest area requiring liquids to facilitate passage. She has episodes of vomiting when food cannot pass through. She has a known hiatal hernia. Upper and lower endoscopy were performed in . She takes daily acid reflux medication with milk or oil when experiencing nausea.     Vaccines: Influenza (declined); Tetanus (); Shingrix (will consider)  Eye exam:   Mammogram:   Gyn exam:   Colonoscopy:      Exercise: no  Diet: regular  LMP: menopausal     Past Medical History:  No date: Anxiety  No date: Back problem  No date: GERD (gastroesophageal reflux disease)  No date: Hiatal hernia  No date: Obesity (BMI 30-39.9)  Past Surgical History:  11/3/2022: COLONOSCOPY; N/A      Comment:  Procedure: COLONOSCOPY;  Surgeon: Mihir Roberson MD;                 Location: [address]);  Service: Endoscopy;                 Laterality: N/A;  11/3/2022: ESOPHAGOGASTRODUODENOSCOPY; N/A      Comment:  Procedure: EGD (ESOPHAGOGASTRODUODENOSCOPY);  Surgeon:                Mihir Roberson MD;  Location: [address]);  Service:                Endoscopy;  Laterality: N/A;  rapid-inst email am                prep-tb-order re-created from  order-ray onlypre               call done, no answerverified arrival time 9am  No date: TONSILLECTOMY  Social History    Socioeconomic History      Marital status: Single      Number of children: 0    Tobacco Use      Smoking status: Former        Passive exposure: Never      Smokeless tobacco: Never    Substance and Sexual Activity      Alcohol use: Yes        Comment: rare      Drug use: Never      Sexual activity: Yes         Partners: Male        Birth control/protection: None    Social History Narrative      Associate at [employer]     Review of patient's allergies indicates:  No Known Allergies  Nina Cuba had no medications administered during this visit.  Review of Systems   Constitutional:  Negative for activity change, appetite change, chills, diaphoresis, fatigue, fever and unexpected weight change.   HENT:  Negative for nasal congestion, mouth sores, postnasal drip, rhinorrhea, sinus pressure/congestion, sneezing, sore throat, trouble swallowing and voice change.    Eyes:  Negative for pain, discharge and visual disturbance.   Respiratory:  Negative for cough, shortness of breath and wheezing.    Cardiovascular:  Negative for chest pain, palpitations and leg swelling.   Gastrointestinal:  Negative for abdominal pain, blood in stool, constipation, diarrhea, nausea and vomiting.   Endocrine: Negative for cold intolerance and heat intolerance.   Genitourinary:  Negative for difficulty urinating, dysuria, frequency, hematuria and urgency.   Musculoskeletal:  Positive for arthralgias. Negative for myalgias.   Integumentary:  Negative for rash and wound.   Allergic/Immunologic: Negative for environmental allergies and food allergies.   Neurological:  Negative for dizziness, tremors, seizures, syncope, weakness, light-headedness and headaches.   Hematological:  Negative for adenopathy. Does not bruise/bleed easily.   Psychiatric/Behavioral:  Negative for confusion, self-injury, sleep disturbance and suicidal ideas. The patient is nervous/anxious.           Objective     Physical Exam  Vitals and nursing note reviewed.   Constitutional:       General: She is not in acute distress.     Appearance: Normal appearance. She is well-developed. She is not diaphoretic.   HENT:      Head: Normocephalic and atraumatic.      Right Ear: External ear normal.      Left Ear: External ear normal.      Nose: Nose normal.       Mouth/Throat:      Pharynx: No oropharyngeal exudate.   Eyes:      General: No scleral icterus.        Right eye: No discharge.         Left eye: No discharge.      Conjunctiva/sclera: Conjunctivae normal.      Pupils: Pupils are equal, round, and reactive to light.   Neck:      Thyroid: No thyromegaly.      Vascular: No JVD.   Cardiovascular:      Rate and Rhythm: Normal rate and regular rhythm.      Pulses: Normal pulses.      Heart sounds: Normal heart sounds. No murmur heard.  Pulmonary:      Effort: Pulmonary effort is normal. No respiratory distress.      Breath sounds: Normal breath sounds. No wheezing, rhonchi or rales.   Chest:      Chest wall: No tenderness.   Abdominal:      General: Bowel sounds are normal. There is no distension.      Palpations: Abdomen is soft.      Tenderness: There is no abdominal tenderness. There is no guarding or rebound.   Musculoskeletal:      Cervical back: Neck supple.      Right lower leg: No edema.      Left lower leg: No edema.   Lymphadenopathy:      Cervical: No cervical adenopathy.   Skin:     General: Skin is warm and dry.      Coloration: Skin is not pale.      Findings: No rash.   Neurological:      General: No focal deficit present.      Mental Status: She is alert and oriented to person, place, and time.      Gait: Gait normal.   Psychiatric:         Behavior: Behavior normal.         Thought Content: Thought content normal.         Judgment: Judgment normal.            Assessment and Plan     1. Anxiety    2. Gastroesophageal reflux disease, unspecified whether esophagitis present  -     Ambulatory referral/consult to Endo Procedure ; Future; Expected date: 05/14/2025    3. Primary osteoarthritis of hand, unspecified laterality    4. Esophageal dysphagia  -     Ambulatory referral/consult to Endo Procedure ; Future; Expected date: 05/14/2025    5. Hiatal hernia    6. Abdominal spasms  -     dicyclomine (BENTYL) 20 mg tablet; Take 1 tablet (20 mg  total) by mouth 3 (three) times daily as needed (abdominal pain).  Dispense: 90 tablet; Refill: 3    7. Annual physical exam  -     Ambulatory referral/consult to Gynecology; Future; Expected date: 05/20/2025         Blood work ordered       Obesity- diet/exercise stressed       GERD/Hiatal hernia/dysphagia- continue Prilosec 40 mg qam   -repeat EGD ordered       Anxiety- stable on Ativan PRN      OA hand/CTS- followed by Ortho      F/u in 6 months

## 2025-05-21 ENCOUNTER — HOSPITAL ENCOUNTER (OUTPATIENT)
Dept: RADIOLOGY | Facility: HOSPITAL | Age: 60
Discharge: HOME | End: 2025-05-21
Attending: INTERNAL MEDICINE
Payer: COMMERCIAL

## 2025-05-21 ENCOUNTER — LAB VISIT (OUTPATIENT)
Dept: LAB | Facility: HOSPITAL | Age: 60
End: 2025-05-21
Attending: INTERNAL MEDICINE
Payer: COMMERCIAL

## 2025-05-21 VITALS — WEIGHT: 175 LBS | BODY MASS INDEX: 27.47 KG/M2 | HEIGHT: 67 IN

## 2025-05-21 DIAGNOSIS — E66.9 OBESITY (BMI 30-39.9): ICD-10-CM

## 2025-05-21 DIAGNOSIS — F41.9 ANXIETY: ICD-10-CM

## 2025-05-21 DIAGNOSIS — Z12.39 ENCOUNTER FOR SCREENING FOR MALIGNANT NEOPLASM OF BREAST, UNSPECIFIED SCREENING MODALITY: ICD-10-CM

## 2025-05-21 DIAGNOSIS — K21.9 GASTROESOPHAGEAL REFLUX DISEASE, UNSPECIFIED WHETHER ESOPHAGITIS PRESENT: ICD-10-CM

## 2025-05-21 LAB
ABSOLUTE EOSINOPHIL (OHS): 0.15 K/UL
ABSOLUTE MONOCYTE (OHS): 0.42 K/UL (ref 0.3–1)
ABSOLUTE NEUTROPHIL COUNT (OHS): 3.07 K/UL (ref 1.8–7.7)
ALBUMIN SERPL BCP-MCNC: 4 G/DL (ref 3.5–5.2)
ALP SERPL-CCNC: 81 UNIT/L (ref 40–150)
ALT SERPL W/O P-5'-P-CCNC: 16 UNIT/L (ref 10–44)
ANION GAP (OHS): 12 MMOL/L (ref 8–16)
AST SERPL-CCNC: 18 UNIT/L (ref 11–45)
BASOPHILS # BLD AUTO: 0.05 K/UL
BASOPHILS NFR BLD AUTO: 0.9 %
BILIRUB SERPL-MCNC: 0.3 MG/DL (ref 0.1–1)
BUN SERPL-MCNC: 26 MG/DL (ref 8–23)
CALCIUM SERPL-MCNC: 9.1 MG/DL (ref 8.7–10.5)
CHLORIDE SERPL-SCNC: 104 MMOL/L (ref 95–110)
CHOLEST SERPL-MCNC: 226 MG/DL (ref 120–199)
CHOLEST/HDLC SERPL: 3.2 {RATIO} (ref 2–5)
CO2 SERPL-SCNC: 27 MMOL/L (ref 23–29)
CREAT SERPL-MCNC: 0.9 MG/DL (ref 0.5–1.4)
EAG (OHS): 114 MG/DL (ref 68–131)
ERYTHROCYTE [DISTWIDTH] IN BLOOD BY AUTOMATED COUNT: 13.2 % (ref 11.5–14.5)
GFR SERPLBLD CREATININE-BSD FMLA CKD-EPI: >60 ML/MIN/1.73/M2
GLUCOSE SERPL-MCNC: 91 MG/DL (ref 70–110)
HBA1C MFR BLD: 5.6 % (ref 4–5.6)
HCT VFR BLD AUTO: 42.9 % (ref 37–48.5)
HDLC SERPL-MCNC: 70 MG/DL (ref 40–75)
HDLC SERPL: 31 % (ref 20–50)
HGB BLD-MCNC: 13.4 GM/DL (ref 12–16)
IMM GRANULOCYTES # BLD AUTO: 0.01 K/UL (ref 0–0.04)
IMM GRANULOCYTES NFR BLD AUTO: 0.2 % (ref 0–0.5)
LDLC SERPL CALC-MCNC: 137 MG/DL (ref 63–159)
LYMPHOCYTES # BLD AUTO: 1.86 K/UL (ref 1–4.8)
MCH RBC QN AUTO: 29.6 PG (ref 27–31)
MCHC RBC AUTO-ENTMCNC: 31.2 G/DL (ref 32–36)
MCV RBC AUTO: 95 FL (ref 82–98)
NONHDLC SERPL-MCNC: 156 MG/DL
NUCLEATED RBC (/100WBC) (OHS): 0 /100 WBC
PLATELET # BLD AUTO: 326 K/UL (ref 150–450)
PMV BLD AUTO: 10.8 FL (ref 9.2–12.9)
POTASSIUM SERPL-SCNC: 4.3 MMOL/L (ref 3.5–5.1)
PROT SERPL-MCNC: 7.4 GM/DL (ref 6–8.4)
RBC # BLD AUTO: 4.52 M/UL (ref 4–5.4)
RELATIVE EOSINOPHIL (OHS): 2.7 %
RELATIVE LYMPHOCYTE (OHS): 33.5 % (ref 18–48)
RELATIVE MONOCYTE (OHS): 7.6 % (ref 4–15)
RELATIVE NEUTROPHIL (OHS): 55.1 % (ref 38–73)
SODIUM SERPL-SCNC: 143 MMOL/L (ref 136–145)
TRIGL SERPL-MCNC: 95 MG/DL (ref 30–150)
TSH SERPL-ACNC: 1.74 UIU/ML (ref 0.4–4)
WBC # BLD AUTO: 5.56 K/UL (ref 3.9–12.7)

## 2025-05-21 PROCEDURE — 77063 BREAST TOMOSYNTHESIS BI: CPT | Mod: TC

## 2025-05-21 PROCEDURE — 77063 BREAST TOMOSYNTHESIS BI: CPT | Mod: 26,,, | Performed by: RADIOLOGY

## 2025-05-21 PROCEDURE — 80061 LIPID PANEL: CPT

## 2025-05-21 PROCEDURE — 80053 COMPREHEN METABOLIC PANEL: CPT

## 2025-05-21 PROCEDURE — 84443 ASSAY THYROID STIM HORMONE: CPT

## 2025-05-21 PROCEDURE — 77067 SCR MAMMO BI INCL CAD: CPT | Mod: 26,,, | Performed by: RADIOLOGY

## 2025-05-21 PROCEDURE — 83036 HEMOGLOBIN GLYCOSYLATED A1C: CPT

## 2025-05-21 PROCEDURE — 85025 COMPLETE CBC W/AUTO DIFF WBC: CPT

## 2025-05-21 PROCEDURE — 36415 COLL VENOUS BLD VENIPUNCTURE: CPT

## 2025-05-27 ENCOUNTER — OFFICE VISIT (OUTPATIENT)
Dept: URGENT CARE | Facility: CLINIC | Age: 60
End: 2025-05-27
Payer: OTHER MISCELLANEOUS

## 2025-05-27 VITALS
BODY MASS INDEX: 27.47 KG/M2 | RESPIRATION RATE: 16 BRPM | HEIGHT: 67 IN | DIASTOLIC BLOOD PRESSURE: 79 MMHG | HEART RATE: 69 BPM | OXYGEN SATURATION: 97 % | WEIGHT: 175 LBS | SYSTOLIC BLOOD PRESSURE: 101 MMHG

## 2025-05-27 DIAGNOSIS — Z02.6 ENCOUNTER RELATED TO WORKER'S COMPENSATION CLAIM: Primary | ICD-10-CM

## 2025-05-27 DIAGNOSIS — S16.1XXD CERVICAL MYOFASCIAL STRAIN, SUBSEQUENT ENCOUNTER: ICD-10-CM

## 2025-05-27 PROCEDURE — 99213 OFFICE O/P EST LOW 20 MIN: CPT | Mod: S$GLB,,, | Performed by: NURSE PRACTITIONER

## 2025-05-27 RX ORDER — METHOCARBAMOL 500 MG/1
500 TABLET, FILM COATED ORAL 3 TIMES DAILY
Qty: 30 TABLET | Refills: 0 | Status: SHIPPED | OUTPATIENT
Start: 2025-05-27 | End: 2025-06-06

## 2025-05-27 NOTE — LETTER
[facility]  [address] [address]  Phone: [phone]  Fax: [phone]  [facility]: [id number]     Name: Nina Cuba  Injury Date: 05/10/2025   Employee ID: [id number] Date of Treatment: 05/27/2025   Company: [employer]      Appointment Time: 01:45 PM Arrived: 2:00 pm   Provider: FADY Crespo Time Out:3:10 pm     Office Treatment:   1. Encounter related to worker's compensation claim    2. Cervical myofascial strain, subsequent encounter      Medications Ordered This Encounter   Medications    methocarbamoL (ROBAXIN) 500 MG Tab      Patient Instructions: Attention not to aggravate affected area      Restrictions: No lifting/pushing/pulling more than 25 lbs, No above the shoulder/overhead work, Sit or stand as needed     Return Appointment: 6/10/2025 at 2:00 pm

## 2025-05-27 NOTE — PROGRESS NOTES
Subjective:      Patient ID: Nina Cuba is a 60 y.o. female.    Chief Complaint: Neck Injury    Patient's place of employment - [employer]  Patient's job title - Coordinator  Date of injury - 05/10/25  Body part injured including left or right - Neck  Current work status per last visit - [employer]-Disabled  Improved, same, or worse - Same  Pain Scale right now (1-10) -  8/10    Patient states stiffness and tingling from neck area down to shoulder area. Patient states having more headaches now since the injury. Patient states during headaches patient starts feeling nausea.    Neck Injury   This is a new problem. The current episode started 1 to 4 weeks ago. The problem occurs constantly. The problem has been unchanged. The quality of the pain is described as burning (sharp). The pain is at a severity of 8/10. The pain is severe. The symptoms are aggravated by bending, twisting and position. The pain is Same all the time. Stiffness is present All day. Associated symptoms include headaches, numbness and tingling. She has tried muscle relaxants, ice and heat for the symptoms.       Neurological:  Positive for headaches and numbness.     59 y/o female here today for follow up after hurting neck approx 2 weeks ago at work. Reports pain has improved over the last week but she is still having intermittent neck stiffness causing headaches that is relieved by rest and lying down. She has not been working and states her job is not able to accommodate her restrictions. She has been taking Ibuprofen as needed as well as robaxin with moderate relief but is out of robaxin. Denies any numbness, tingling, weakness, loss of function to neck or upper extremities.   Objective:     Physical Exam  Vitals and nursing note reviewed.   Constitutional:       Appearance: Normal appearance.   HENT:      Head: Normocephalic.      Nose: Nose normal.   Eyes:      Extraocular Movements: Extraocular movements intact.       Conjunctiva/sclera: Conjunctivae normal.   Neck:        Comments: No focal cervical spinal tenderness but does have TTP throughout B sternocleidomastoid as well as trapezius. No ecchymosis or edema. Able to flex extend neck without difficulty, rotation still painful but improved ROM since last visit.     Full ROM to B shoulders with equal strength 5/5 with sensation intact through out.  Cardiovascular:      Rate and Rhythm: Normal rate.      Pulses: Normal pulses.      Heart sounds: Normal heart sounds.   Pulmonary:      Effort: Pulmonary effort is normal.      Breath sounds: Normal breath sounds.   Skin:     General: Skin is warm.      Capillary Refill: Capillary refill takes less than 2 seconds.      Coloration: Skin is not pale.   Neurological:      General: No focal deficit present.      Mental Status: She is alert and oriented to person, place, and time.      GCS: GCS eye subscore is 4. GCS verbal subscore is 5. GCS motor subscore is 6.      Cranial Nerves: Cranial nerves 2-12 are intact.      Sensory: Sensation is intact.      Motor: Motor function is intact.      Coordination: Coordination is intact.   Psychiatric:         Attention and Perception: Attention normal.         Mood and Affect: Mood normal.         Speech: Speech normal.         Behavior: Behavior normal. Behavior is cooperative.         Thought Content: Thought content normal.         Judgment: Judgment normal.        Assessment:      1. Encounter related to worker's compensation claim    2. Cervical myofascial strain, subsequent encounter      Plan:       Medications Ordered This Encounter   Medications    methocarbamoL (ROBAXIN) 500 MG Tab     Sig: Take 1 tablet (500 mg total) by mouth 3 (three) times daily. for 10 days     Dispense:  30 tablet     Refill:  0     Patient Instructions: Attention not to aggravate affected area   Restrictions: No lifting/pushing/pulling more than 25 lbs, No above the shoulder/overhead work, Sit or stand as  needed  Follow up in about 2 weeks (around 6/10/2025).  Diagnoses and plan discussed with the patient, as well as the expected course and duration of symptoms. Risks and benefits of any medication prescribed during this visit was explained, verbal instructions on use given. Clinic/Emergency department return precautions were given, can return to [facility] before scheduled follow-up appointment if notes worsening/aggravation of symptoms. All questions and concerns were addressed prior to discharge. Plan was developed with active input from the patient and they verbalized understanding of and agreement with the POC.  [facility] was informed of any referrals and relevant orders.

## 2025-06-02 ENCOUNTER — RESULTS FOLLOW-UP (OUTPATIENT)
Dept: INTERNAL MEDICINE | Facility: CLINIC | Age: 60
End: 2025-06-02

## 2025-06-10 ENCOUNTER — OFFICE VISIT (OUTPATIENT)
Dept: URGENT CARE | Facility: CLINIC | Age: 60
End: 2025-06-10
Payer: OTHER MISCELLANEOUS

## 2025-06-10 VITALS
BODY MASS INDEX: 27.47 KG/M2 | SYSTOLIC BLOOD PRESSURE: 112 MMHG | HEART RATE: 75 BPM | RESPIRATION RATE: 17 BRPM | HEIGHT: 67 IN | OXYGEN SATURATION: 98 % | WEIGHT: 175 LBS | DIASTOLIC BLOOD PRESSURE: 83 MMHG
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
## 2025-06-10 DIAGNOSIS — S16.1XXA CERVICAL MYOFASCIAL STRAIN, INITIAL ENCOUNTER: Primary | ICD-10-CM

## 2025-06-10 DIAGNOSIS — Z02.6 ENCOUNTER RELATED TO WORKER'S COMPENSATION CLAIM: ICD-10-CM

## 2025-06-10 PROCEDURE — 99214 OFFICE O/P EST MOD 30 MIN: CPT | Mod: S$GLB,,, | Performed by: SURGERY

## 2025-06-10 RX ORDER — METHOCARBAMOL 500 MG/1
500 TABLET, FILM COATED ORAL 3 TIMES DAILY
Qty: 90 TABLET | Refills: 0 | Status: SHIPPED | OUTPATIENT
Start: 2025-06-10 | End: 2025-07-10

## 2025-06-10 RX ORDER — METHYLPREDNISOLONE 4 MG/1
TABLET ORAL
Qty: 21 EACH | Refills: 0 | Status: SHIPPED | OUTPATIENT
Start: 2025-06-10 | End: 2025-07-01

## 2025-06-10 RX ORDER — IBUPROFEN 600 MG/1
600 TABLET, FILM COATED ORAL EVERY 6 HOURS PRN
Qty: 90 TABLET | Refills: 0 | Status: SHIPPED | OUTPATIENT
Start: 2025-06-10 | End: 2025-07-10

## 2025-06-10 NOTE — LETTER
[facility]  [address] [address]  Phone: [phone]  Fax: [phone]  [facility]: [id number]    Pt Name: Nina Cuba  Injury Date: 05/10/2025   Employee ID: [id number] Date of Treatment: 06/10/2025   Company: [employer]      Appointment Time: 02:00 PM Arrived: 02:12 PM   Provider: Rudy Ledesma MD Time Out:03:10 PM     Office Treatment:   1. Cervical myofascial strain, initial encounter    2. Encounter related to worker's compensation claim    3. Fall, initial encounter      Medications Ordered This Encounter   Medications    ibuprofen (ADVIL,MOTRIN) 600 MG tablet    methocarbamoL (ROBAXIN) 500 MG Tab    methylPREDNISolone (MEDROL DOSEPACK) 4 mg tablet      Patient Instructions: PT to be scheduled once authorized, Begin Physical Therapy, Daily home exercises/warm soaks (No NSAID use while taking steroid, okay to use tylenol and muscle relaxer. Can restart NSAID when complete steroid.)    Restrictions: No above the shoulder/overhead work, Sit or stand as needed, No lifting/pushing/pulling more than 25 lbs (Allow frequent breaks. Reduced shift length, no more than 6 consecutive hours per shift.)     Return Appointment: 7/15/2025 at 02:00 PM  ELLIE

## 2025-06-10 NOTE — PROGRESS NOTES
Subjective:      Patient ID: Nina Cuba is a 60 y.o. female.    Chief Complaint: Neck Pain    Patient's place of employment - [employer]  Patient's job title - Coordinator  Date of injury - 05/10/25  Body part injured including left or right - Neck  Current work status per last visit - Light Duty- No lifting/pushing/pulling more than 25 lbs, No above the shoulder/overhead work, Sit or stand as needed  Improved, same, or worse - Same  Pain Scale right now (1-10) -  8/10  SB.    Pt states this past week there was an increase in headaches that cause blurred vision.         Neck: Positive for neck stiffness.   Neurological:  Positive for headaches and numbness.     See MA note above. Begin MD note:    Nina Cuba is a 60 y.o. presenting for follow-up of neck injury.  Recommendations from last visit:  Muscle relaxer  Today, she reports that she has increased pain with working, she experiences a burning pain over the back of her neck and down the sides of the midline spine.  She continues to experience daily headaches that start with tension and pain in the back of the neck.  Headaches are associated with nausea and dizziness.  There alleviated by lying down in a dark room.  These headaches are different from the migraine she has experienced in the past.  I believe those were related to her menstrual cycle and they were typically frontal and temporal.  She is using ibuprofen 200 mg as needed since she has run out of the prescribed ibuprofen.  She is also using heating pad and alternating with ice bag.  She has requested to work shorter shifts at work as she notes increased issues with working up to 8 hours.    Objective:     Physical Exam  Vitals and nursing note reviewed.   Constitutional:       General: She is not in acute distress.     Appearance: She is not ill-appearing.   HENT:      Head: Normocephalic.   Eyes:      Conjunctiva/sclera: Conjunctivae normal.   Neck:        Comments: Decreased range  of motion of the cervical spine, most notable on rotation toward right and extension.  Tenderness to palpation of the right neck and trapezius.  There is generalized muscle tension appreciated to bilateral trapezii.  Pulmonary:      Effort: Pulmonary effort is normal. No respiratory distress.   Musculoskeletal:      Cervical back: Pain with movement and muscular tenderness present. No spinous process tenderness. Decreased range of motion.   Skin:     General: Skin is warm.      Coloration: Skin is not pale.   Neurological:      General: No focal deficit present.      Mental Status: She is alert and oriented to person, place, and time.      GCS: GCS eye subscore is 4. GCS verbal subscore is 5. GCS motor subscore is 6.      Motor: Motor function is intact.      Coordination: Coordination is intact.   Psychiatric:         Attention and Perception: Attention normal.         Mood and Affect: Mood normal.         Speech: Speech normal.         Behavior: Behavior normal. Behavior is cooperative.         Thought Content: Thought content normal.        Assessment:      1. Cervical myofascial strain, initial encounter    2. Encounter related to worker's compensation claim    3. Fall, initial encounter      Plan:     I reviewed the prior treatment notes related to this injury.  History and exam is consistent with cervicogenic headaches.  I advised use of a steroid Dosepak to address inflammation. I believe she would benefit from a short course of physical therapy to address movement deficits, referral sent. I advised that initiation of PT can cause a transient increase in her pain symptoms, she should use the prescribed medications prior to or following PT to address this pain, it should improve as she progress through PT. Work restrictions are to be gradually reduced as she notes improvements with PT treatment. Follow-up after completion of first portion of PT treatment course.     Medications Ordered This Encounter    Medications    ibuprofen (ADVIL,MOTRIN) 600 MG tablet     Sig: Take 1 tablet (600 mg total) by mouth every 6 (six) hours as needed for Pain.     Dispense:  90 tablet     Refill:  0    methocarbamoL (ROBAXIN) 500 MG Tab     Sig: Take 1 tablet (500 mg total) by mouth 3 (three) times daily.     Dispense:  90 tablet     Refill:  0    methylPREDNISolone (MEDROL DOSEPACK) 4 mg tablet     Sig: use as directed     Dispense:  21 each     Refill:  0       Diagnoses and plan discussed with the patient, as well as the expected course and duration of symptoms. Risks and benefits of any medication prescribed during this visit was explained, verbal instructions on use given. Clinic/Emergency department return precautions were given, can return to [facility] before scheduled follow-up appointment if notes worsening/aggravation of symptoms. All questions and concerns were addressed prior to discharge. Plan was developed with active input from the patient and they verbalized understanding of and agreement with the POC.  [facility] was informed of any referrals and relevant orders.  Note was dictated with voice recognition software, please excuse any grammatical errors.    I spent a total of 30 minutes on the day of the visit.  This includes face to face time and non-face to face time preparing to see the patient (e.g. review of medical record), obtaining and/or reviewing separately obtained history, documenting clinical information in the electronic or other health record, independently interpreting results and communicating results to the patient/family/caregiver, or care coordinator.    Patient Instructions: PT to be scheduled once authorized, Begin Physical Therapy, Daily home exercises/warm soaks (No NSAID use while taking steroid, okay to use tylenol and muscle relaxer. Can restart NSAID when complete steroid.)   Restrictions: No above the shoulder/overhead work, Sit or stand as needed, No lifting/pushing/pulling more than 25 lbs  (Allow frequent breaks. Reduced shift length, no more than 6 consecutive hours per shift.)  Follow up in about 5 weeks (around 7/15/2025).

## 2025-06-18 ENCOUNTER — CLINICAL SUPPORT (OUTPATIENT)
Dept: REHABILITATION | Facility: HOSPITAL | Age: 60
End: 2025-06-18
Payer: OTHER MISCELLANEOUS

## 2025-06-18 DIAGNOSIS — R29.898 DECREASED ROM OF NECK: Primary | ICD-10-CM

## 2025-06-18 PROCEDURE — 97161 PT EVAL LOW COMPLEX 20 MIN: CPT

## 2025-06-18 PROCEDURE — 97140 MANUAL THERAPY 1/> REGIONS: CPT

## 2025-06-18 PROCEDURE — 97112 NEUROMUSCULAR REEDUCATION: CPT

## 2025-06-23 ENCOUNTER — PATIENT MESSAGE (OUTPATIENT)
Dept: INTERNAL MEDICINE | Facility: CLINIC | Age: 60
End: 2025-06-23
Payer: COMMERCIAL

## 2025-06-25 ENCOUNTER — TELEPHONE (OUTPATIENT)
Dept: ENDOSCOPY | Facility: HOSPITAL | Age: 60
End: 2025-06-25
Payer: COMMERCIAL

## 2025-06-25 ENCOUNTER — CLINICAL SUPPORT (OUTPATIENT)
Dept: REHABILITATION | Facility: HOSPITAL | Age: 60
End: 2025-06-25
Payer: OTHER MISCELLANEOUS

## 2025-06-25 DIAGNOSIS — R29.898 DECREASED ROM OF NECK: Primary | ICD-10-CM

## 2025-06-25 PROCEDURE — 97140 MANUAL THERAPY 1/> REGIONS: CPT | Mod: CQ

## 2025-06-25 PROCEDURE — 97112 NEUROMUSCULAR REEDUCATION: CPT | Mod: CQ

## 2025-06-25 NOTE — PROGRESS NOTES
Outpatient Rehab    Physical Therapy Visit    Patient Name: Nina Cuba  MRN: [id number]  YOB: 1965  Encounter Date: 6/25/2025    Therapy Diagnosis:   Encounter Diagnosis   Name Primary?    Decreased ROM of neck Yes     Physician: Rudy Ledesma MD    Physician Orders: Eval and Treat  Medical Diagnosis: Encounter related to worker's compensation claim  Fall, initial encounter  Cervical myofascial strain, initial encounter  Surgical Diagnosis: Not applicable for this Episode   Surgical Date: Not applicable for this Episode  Days Since Last Surgery: Not applicable for this Episode    Visit # / Visits Authorized:  2 / 12  Insurance Authorization Period: 6/10/2025 to 6/9/2026  Date of Evaluation: 6/18/2025  Plan of Care Certification: 6/25/2025 to 8/13/2025      PT/PTA: PTA   Number of PTA visits since last PT visit:1  Time In: 1510   Time Out: 1605  Total Time (in minutes): 55   Total Billable Time (in minutes): 55    FOTO:  Intake Score:  %  Survey Score 2:  %  Survey Score 3:  %    Precautions:       Subjective   pain across her upper back/neck.  Pain reported as 7/10.      Objective            Treatment:  Manual Therapy  MT 2: Cervical Distraction  MT 3: STM to cervical paraspinals, suboccipital mm, levator scap, upper trap  Balance/Neuromuscular Re-Education  NMR 1: Education on hep, poc, functional anatomy, and activity modification/load progressions  NMR 2: Supine cervical chin tuck 5 sec hold 2x15 reps; (repeated pre-manual) Supine cervical chin tuck 5 sec hold 2x15 reps  NMR 3: Scapular Squeeze 5 sec hold x 20 reps;  NMR 4: rotational SNAGS with towel, R rotation only: 2x10 with 5 sec holds; extensions SNAGS with towel: 2x10 with 5 sec holds  NMR 5: levator scap stretch, L side only: 3x30 sec; upper trap, L side only: 3x30 sec    Time Entry(in minutes):  Manual Therapy Time Entry: 10  Neuromuscular Re-Education Time Entry: 45    Assessment & Plan   Assessment: Patient tolerated treatment  well as patient reported decreased pain post treatment session. Treatment focused on cervical mobility and pain reduction. Will progress as tolerated.        The patient will continue to benefit from skilled outpatient physical therapy in order to address the deficits listed in the problem list on the initial evaluation, provide patient and family education, and maximize the patients level of independence in the home and community environments.     The patient's spiritual, cultural, and educational needs were considered, and the patient is agreeable to the plan of care and goals.           Plan: Will continue per POC towards treatment goals. PT/PTA met face to face to discuss patient's treatment plan and progress towards established goals. Patient will be seen by physical therapist every sixth visit and minimally once per month.    Goals:   Active       Functional outcome       Patient will show a significant change in FOTO patient-reported outcome tool to demonstrate subjective improvement       Start:  06/18/25    Expected End:  08/13/25            Patient stated goal: get back to work        Start:  06/18/25    Expected End:  08/13/25            Patient will demonstrate independence in home program for support of progression       Start:  06/18/25    Expected End:  08/13/25               Hand and arm use       Patient will reach overhead at least 10x with < 2/10 pain for return to work tasks       Start:  06/18/25    Expected End:  08/13/25               Lifting & carrying objects       Patient will lift at least 10# for return to work tasks       Start:  06/18/25    Expected End:  08/13/25            Patient will carry at least 10# for return to work tasks       Start:  06/18/25    Expected End:  08/13/25               Pain       Patient will report pain of < 2/10 demonstrating a reduction of overall pain       Start:  06/18/25    Expected End:  08/13/25            Patient will report a 2 point reduction in  pain while performing daily tasks       Start:  06/18/25    Expected End:  08/13/25               Range of Motion       Patient will achieve bilateral cervical rotation ROM > 60 degrees for daily tasks       Start:  06/18/25    Expected End:  08/13/25            Patient will achieve cervical flexion ROM > 60 degrees       Start:  06/18/25    Expected End:  08/13/25            Patient will achieve cervical extension ROM > 50 degrees for overhead tasks       Start:  06/18/25    Expected End:  08/13/25                Joselo Caputo, PTA

## 2025-06-25 NOTE — PROGRESS NOTES
Outpatient Rehab    Physical Therapy Evaluation    Patient Name: Nina Cuba  MRN: [id number]  YOB: 1965  Encounter Date: 6/18/2025    Therapy Diagnosis:   Encounter Diagnosis   Name Primary?    Decreased ROM of neck Yes     Physician: Rudy Ledesma MD    Physician Orders: Eval and Treat  Medical Diagnosis: Encounter related to worker's compensation claim  Fall, initial encounter  Cervical myofascial strain, initial encounter  Surgical Diagnosis: Not applicable for this Episode   Surgical Date: Not applicable for this Episode  Days Since Last Surgery: Not applicable for this Episode    Visit # / Visits Authorized:  1 / 12  Insurance Authorization Period: 6/10/2025 to 6/9/2026  Date of Evaluation: 6/18/2025  Plan of Care Certification: 6/18/2025 to 8/13/2025     Time In: 1430   Time Out: 1526  Total Time (in minutes): 56   Total Billable Time (in minutes): 56    Intake Outcome Measure for FOTO Survey    Therapist reviewed FOTO scores for Nina Cuba on 6/18/2025.   FOTO report - see Media section or FOTO account episode details.     Intake Score:  %    Precautions:       Subjective   History of Present Illness  Nina is a 60 y.o. female who reports to physical therapy with a chief concern of neck pain.     The patient reports a medical diagnosis of Encounter related to worker's compensation claim (Z02.6), Fall, initial encounter (W19.XXXA), Cervical myofascial strain, initial encounter (S16.1XXA).            History of Present Condition/Illness: Patient presents to the clinic with complaints of neck pain that started on 5/10 after a metal rug rack fell and hit the back of her neck. She states that she went to the ED and has since seen [facility]. She reports that she feels like her symptoms have improved since taking the medication and with time. She states that she has the most difficulty with rotating her neck side to side. She states that she did have some symptoms into her arm but she  no longer has this complaint. From MD note on 6/10: Patient's place of employment - [employer] Patient's job title - Coordinator Date of injury - 05/10/25 Body part injured including left or right - Neck Current work status per last visit - Light Duty- No lifting/pushing/pulling more than 25 lbs, No above the shoulder/overhead work, Sit or stand as needed Improved, same, or worse - Same Pain Scale right now (1-10) -  8/10     Activities of Daily Living  Social history was obtained from Patient.    General Prior Level of Function Comments: indep  General Current Level of Function Comments: indep           Pain     Patient reports a current pain level of 2/10. Pain at best is reported as 0/10. Pain at worst is reported as 5/10.   Location: neck  Clinical Progression (since onset): Improved  Pain Qualities: Pulling, Discomfort, Tightness  Pain-Relieving Factors: Medications - over-the-counter, Medications - prescription, Rest, Heat  Pain-Aggravating Factors: Driving, Head movements         Living Arrangements  Living Situation  Housing: Home independently  Living Arrangements: Alone        Employment      Home Goods      Past Medical History/Physical Systems Review:   Nina Cuba  has a past medical history of Anxiety, Back problem, GERD (gastroesophageal reflux disease), Hiatal hernia, and Obesity (BMI 30-39.9).    Nina Cuba  has a past surgical history that includes Tonsillectomy; Esophagogastroduodenoscopy (N/A, 11/3/2022); and Colonoscopy (N/A, 11/3/2022).    Nina has a current medication list which includes the following prescription(s): albuterol, dicyclomine, estradiol, guaifenesin-codeine 100-10 mg/5 ml, ibuprofen, lorazepam, methocarbamol, methylprednisolone, omeprazole, and progesterone.    Review of patient's allergies indicates:  No Known Allergies     Objective          Observation: Patient presents independently with     Cervical Range of Motion:    Degrees   Flexion 34* (tightens up)    Extension 28   Right Rotation 60   Left Rotation 53 (rigidity)   Right Side Bending 32   Left Side Bending 33      Shoulder Range of Motion: within normal limits     Upper Extremity Strength  (R) UE  (L) UE    Shoulder flexion: 4/5 Shoulder flexion: 4/5   Shoulder Abduction: 4/5 Shoulder abduction: 4/5   Shoulder ER 4/5 Shoulder ER 4/5   Shoulder IR 5/5 Shoulder IR 5/5       Special Tests:  Distraction Pos   Spurlings Neg   Vertebral Artery Neg   Pierre Neg   Lateral Flexion Alar Ligament Neg   Transverse Ligament Neg   Shoulder Abduction Test Neg   Quadrant Testing Neg     Cervical joint mobility: within normal limits       Thoracic mobility: hypomobile    Palpation: tenderness near suboccipitals       Sensation: intact    Neural tension:   -ULTT Median: Neg  -ULTT Ulnar: Neg  -ULTT Radial: Neg    Treatment:  Manual Therapy  MT 1: Grade I-III Cervical Mobs  MT 2: Cervical Distraction  Balance/Neuromuscular Re-Education  NMR 1: Education on hep, poc, functional anatomy, and activity modification/load progressions  NMR 2: Scapular Squeeze 5 sec hold x 20 reps  NMR 3: Supine cervical chin tuck 5 sec hold x 20 reps    Time Entry(in minutes):  PT Evaluation (Low) Time Entry: 25  Manual Therapy Time Entry: 8  Neuromuscular Re-Education Time Entry: 23    Assessment & Plan   Assessment  Nina presents with a condition of Low complexity.   Presentation of Symptoms: Stable       Functional Limitations: Activity tolerance, Carrying objects, Completing work/school activities, Pain when reaching, Pain with ADLs/IADLs, Sitting tolerance, Reaching  Impairments: Activity intolerance, Abnormal or restricted range of motion, Impaired physical strength, Lack of appropriate home exercise program, Pain with functional activity  Personal Factors Affecting Prognosis: Pain    Patient Goal for Therapy (PT): get back to work  Prognosis: Excellent  Assessment Details: Patient is a 60 year old female who presents with complaints of neck  pain. Per objective testing, patient with likely muscular strain and suspect continued progress and improvement through course of therapy. Patient demonstrates deficits with range of motion, strength, and function that limit ability to participate in daily tasks, work, and recreational activities. They would benefit from skilled PT services to normalize kinetic chain mobility, strength, and function to safely return to their prior level of activity.    Plan  From a physical therapy perspective, the patient would benefit from: Skilled Rehab Services    Planned therapy interventions include: Therapeutic exercise, Therapeutic activities, Neuromuscular re-education, Manual therapy, ADLs/IADLs, Other (Comment), and Gait training. Dry Needling (prn)  Planned modalities to include: Biofeedback, Electrical stimulation - attended, Electrical stimulation - passive/unattended, Thermotherapy (hot pack), and Cryotherapy (cold pack).        Visit Frequency: 2 times Per Week for 8 Weeks.       This plan was discussed with Patient.   Discussion participants: Agreed Upon Plan of Care  Plan details: Frequency and duration of treatment to be adjusted as needed          The patient's spiritual, cultural, and educational needs were considered, and the patient is agreeable to the plan of care and goals.     Education  Education was done with Patient. The patient's learning style includes Demonstration and Listening. The patient Demonstrates understanding and Verbalizes understanding.         - compliance with HEP - DOMS - activity modification - load progression       Goals:   Active       Functional outcome       Patient will show a significant change in FOTO patient-reported outcome tool to demonstrate subjective improvement       Start:  06/18/25    Expected End:  08/13/25            Patient stated goal: get back to work        Start:  06/18/25    Expected End:  08/13/25            Patient will demonstrate independence in home program  for support of progression       Start:  06/18/25    Expected End:  08/13/25               Hand and arm use       Patient will reach overhead at least 10x with < 2/10 pain for return to work tasks       Start:  06/18/25    Expected End:  08/13/25               Lifting & carrying objects       Patient will lift at least 10# for return to work tasks       Start:  06/18/25    Expected End:  08/13/25            Patient will carry at least 10# for return to work tasks       Start:  06/18/25    Expected End:  08/13/25               Pain       Patient will report pain of < 2/10 demonstrating a reduction of overall pain       Start:  06/18/25    Expected End:  08/13/25            Patient will report a 2 point reduction in pain while performing daily tasks       Start:  06/18/25    Expected End:  08/13/25               Range of Motion       Patient will achieve bilateral cervical rotation ROM > 60 degrees for daily tasks       Start:  06/18/25    Expected End:  08/13/25            Patient will achieve cervical flexion ROM > 60 degrees       Start:  06/18/25    Expected End:  08/13/25            Patient will achieve cervical extension ROM > 50 degrees for overhead tasks       Start:  06/18/25    Expected End:  08/13/25                Ritu Rasmussen, PT

## 2025-06-25 NOTE — TELEPHONE ENCOUNTER
Ambulatory referral for EGD received.  Attempted to contact patient to schedule. Left voicemail message with direct number and Endoscopy Scheduling # [phone] to schedule procedure.

## 2025-06-30 ENCOUNTER — TELEPHONE (OUTPATIENT)
Dept: ENDOSCOPY | Facility: HOSPITAL | Age: 60
End: 2025-06-30
Payer: COMMERCIAL

## 2025-06-30 NOTE — TELEPHONE ENCOUNTER
Ambulatory referral for EGD received.  Attempted to contact patient to schedule. Sent message to portal. Left voicemail message with direct number and Endoscopy Scheduling # [phone] to schedule procedure.

## 2025-07-01 ENCOUNTER — CLINICAL SUPPORT (OUTPATIENT)
Dept: REHABILITATION | Facility: HOSPITAL | Age: 60
End: 2025-07-01
Payer: OTHER MISCELLANEOUS

## 2025-07-01 DIAGNOSIS — R29.898 DECREASED ROM OF NECK: Primary | ICD-10-CM

## 2025-07-01 PROCEDURE — 97112 NEUROMUSCULAR REEDUCATION: CPT

## 2025-07-01 PROCEDURE — 97140 MANUAL THERAPY 1/> REGIONS: CPT

## 2025-07-01 NOTE — PROGRESS NOTES
Outpatient Rehab    Physical Therapy Visit    Patient Name: Nina Cuba  MRN: [id number]  YOB: 1965  Encounter Date: 7/1/2025    Therapy Diagnosis:   Encounter Diagnosis   Name Primary?    Decreased ROM of neck Yes     Physician: Rudy Ledesma MD    Physician Orders: Eval and Treat  Medical Diagnosis: Encounter related to worker's compensation claim  Fall, initial encounter  Cervical myofascial strain, initial encounter  Surgical Diagnosis: Not applicable for this Episode   Surgical Date: Not applicable for this Episode  Days Since Last Surgery: Not applicable for this Episode    Visit # / Visits Authorized:  3 / 12  Insurance Authorization Period: 6/10/2025 to 6/9/2026  Date of Evaluation: 6/18/2025  Plan of Care Certification: 6/25/2025 to 8/13/2025      PT/PTA: PT   Number of PTA visits since last PT visit:0  Time In: 1401   Time Out: 1502  Total Time (in minutes): 61   Total Billable Time (in minutes): 61    FOTO:  Intake Score:  %  Survey Score 2:  %  Survey Score 3:  %    Precautions:       Subjective   patient reports that she feels like she is doing much better and has noticed how much she has gotten better since therapy. She states that she is able to move her neck in certain positions that she was not able to do previously. She states that her headaches have gotten better as well..  Pain reported as 4/10.      Objective            Treatment:  Manual Therapy  MT 1: Grade I-III Cervical Mobs  MT 2: Cervical Distraction  MT 3: STM to cervical paraspinals, suboccipital mm, levator scap, upper trap  Balance/Neuromuscular Re-Education  NMR 1: Education on hep, poc, functional anatomy, and activity modification/load progressions  NMR 2: Supine cervical chin tuck 5 sec hold 2x15 reps; (repeated pre-manual) Supine cervical chin tuck 5 sec hold 2x15 reps  NMR 3: Scapular Squeeze 5 sec hold x 20 reps;  NMR 4: rotational SNAGS with towel, R rotation only: 2x10 with 5 sec holds; extensions SNAGS  with towel: 2x10 with 5 sec holds  NMR 5: levator scap stretch, L side only: 3x30 sec; upper trap, L side only: 3x30 sec  NMR 6: Standing rows and extensions GTB 5 sec hold 3 x 10 each  NMR 7: Supine chin tucks + 3# cane chest press + SA Punch 3 x 10  NMR 8: Supine chin tucks + 3# cane + shoulder flexion 3 sec hold x 20 reps    Time Entry(in minutes):  Manual Therapy Time Entry: 8  Neuromuscular Re-Education Time Entry: 53    Assessment & Plan   Assessment: Patient able to tolerate all exercise interventions this date and making very good progress with range of motion and mobility. Patient able to progress with resistance exercises and focus on periscapular strengthening but requires moderate cueing for performance       The patient will continue to benefit from skilled outpatient physical therapy in order to address the deficits listed in the problem list on the initial evaluation, provide patient and family education, and maximize the patients level of independence in the home and community environments.     The patient's spiritual, cultural, and educational needs were considered, and the patient is agreeable to the plan of care and goals.           Plan: Continue per POC    Goals:   Active       Functional outcome       Patient will show a significant change in FOTO patient-reported outcome tool to demonstrate subjective improvement (Ongoing)       Start:  06/18/25    Expected End:  08/13/25            Patient stated goal: get back to work  (Ongoing)       Start:  06/18/25    Expected End:  08/13/25            Patient will demonstrate independence in home program for support of progression (Ongoing)       Start:  06/18/25    Expected End:  08/13/25               Hand and arm use       Patient will reach overhead at least 10x with < 2/10 pain for return to work tasks (Ongoing)       Start:  06/18/25    Expected End:  08/13/25               Lifting & carrying objects       Patient will lift at least 10# for return  to work tasks (Ongoing)       Start:  06/18/25    Expected End:  08/13/25            Patient will carry at least 10# for return to work tasks (Ongoing)       Start:  06/18/25    Expected End:  08/13/25               Pain       Patient will report pain of < 2/10 demonstrating a reduction of overall pain (Ongoing)       Start:  06/18/25    Expected End:  08/13/25            Patient will report a 2 point reduction in pain while performing daily tasks (Ongoing)       Start:  06/18/25    Expected End:  08/13/25               Range of Motion       Patient will achieve bilateral cervical rotation ROM > 60 degrees for daily tasks (Ongoing)       Start:  06/18/25    Expected End:  08/13/25            Patient will achieve cervical flexion ROM > 60 degrees (Ongoing)       Start:  06/18/25    Expected End:  08/13/25            Patient will achieve cervical extension ROM > 50 degrees for overhead tasks (Ongoing)       Start:  06/18/25    Expected End:  08/13/25                Ritu Rasmussen, PT

## 2025-07-03 ENCOUNTER — DOCUMENTATION ONLY (OUTPATIENT)
Dept: REHABILITATION | Facility: HOSPITAL | Age: 60
End: 2025-07-03
Payer: COMMERCIAL

## 2025-07-03 NOTE — PROGRESS NOTES
Physical Therapy: No show/Cancellation of Visit  Date: 07/03/2025    Patient was a cancel to today's PT appointment. Patient's next scheduled appointment is 7/9/2025 at 4 pm.    Cancel: 1   No show: 0     Therapist: Joselo Caputo, PTA

## 2025-07-09 ENCOUNTER — CLINICAL SUPPORT (OUTPATIENT)
Dept: REHABILITATION | Facility: HOSPITAL | Age: 60
End: 2025-07-09
Payer: OTHER MISCELLANEOUS

## 2025-07-09 DIAGNOSIS — R29.898 DECREASED ROM OF NECK: Primary | ICD-10-CM

## 2025-07-09 PROCEDURE — 97112 NEUROMUSCULAR REEDUCATION: CPT | Mod: CQ

## 2025-07-09 PROCEDURE — 97140 MANUAL THERAPY 1/> REGIONS: CPT | Mod: CQ

## 2025-07-09 NOTE — PROGRESS NOTES
Outpatient Rehab    Physical Therapy Visit    Patient Name: Nina Cuba  MRN: [id number]  YOB: 1965  Encounter Date: 7/9/2025    Therapy Diagnosis:   Encounter Diagnosis   Name Primary?    Decreased ROM of neck Yes     Physician: Rudy Ledesma MD    Physician Orders: Eval and Treat  Medical Diagnosis: Encounter related to worker's compensation claim  Fall, initial encounter  Cervical myofascial strain, initial encounter  Surgical Diagnosis: Not applicable for this Episode   Surgical Date: Not applicable for this Episode  Days Since Last Surgery: Not applicable for this Episode    Visit # / Visits Authorized:  4 / 12  Insurance Authorization Period: 6/10/2025 to 6/9/2026  Date of Evaluation: 6/18/2025  Plan of Care Certification: 6/25/2025 to 8/13/2025      PT/PTA: PTA   Number of PTA visits since last PT visit:1  Time In: 1415   Time Out: 1510  Total Time (in minutes): 55   Total Billable Time (in minutes): 55    FOTO:  Intake Score:  %  Survey Score 2:  %  Survey Score 3:  %    Precautions:       Subjective   L side is hurting more than R today; headaches are decreasing in frequency but not in intensity.  Pain reported as 6/10.      Objective            Treatment:  Manual Therapy  MT 2: Cervical Distraction  MT 3: STM to cervical paraspinals, suboccipital mm, levator scap, upper trap  Balance/Neuromuscular Re-Education  NMR 1: Education on hep, poc, functional anatomy, and activity modification/load progressions  NMR 2: Supine cervical chin tuck 5 sec hold 2x15 reps; (repeated pre-manual) Supine cervical chin tuck 5 sec hold 2x15 reps  NMR 3: Scapular Squeeze 5 sec hold x 20 reps;  NMR 4: rotational SNAGS with towel, R rotation only: 2x10 with 5 sec holds; extensions SNAGS with towel: 2x10 with 5 sec holds  NMR 5: levator scap stretch, L side only: 3x30 sec; upper trap, L side only: 3x30 sec  NMR 6: Standing rows and extensions GTB 5 sec hold 3 x 10 each  NMR 7: Supine chin tucks + 3# cane  chest press + SA Punch 3 x 10  NMR 8: Supine chin tucks + 3# cane + shoulder flexion 3 sec hold x 20 reps    Time Entry(in minutes):  Manual Therapy Time Entry: 8  Neuromuscular Re-Education Time Entry: 47    Assessment & Plan   Assessment: Patient able to tolerate all exercise interventions this date and appears to be making very good progress with range of motion and mobility. Patient also continued to perform resistance exercises which focused on periscapular strengthening but required moderate cueing for performance.       The patient will continue to benefit from skilled outpatient physical therapy in order to address the deficits listed in the problem list on the initial evaluation, provide patient and family education, and maximize the patients level of independence in the home and community environments.     The patient's spiritual, cultural, and educational needs were considered, and the patient is agreeable to the plan of care and goals.           Plan: Will continue per POC towards treatment goals. PT/PTA met face to face to discuss patient's treatment plan and progress towards established goals. Patient will be seen by physical therapist every sixth visit and minimally once per month.    Goals:   Active       Functional outcome       Patient will show a significant change in FOTO patient-reported outcome tool to demonstrate subjective improvement (Ongoing)       Start:  06/18/25    Expected End:  08/13/25            Patient stated goal: get back to work  (Ongoing)       Start:  06/18/25    Expected End:  08/13/25            Patient will demonstrate independence in home program for support of progression (Ongoing)       Start:  06/18/25    Expected End:  08/13/25               Hand and arm use       Patient will reach overhead at least 10x with < 2/10 pain for return to work tasks (Ongoing)       Start:  06/18/25    Expected End:  08/13/25               Lifting & carrying objects       Patient will lift  at least 10# for return to work tasks (Ongoing)       Start:  06/18/25    Expected End:  08/13/25            Patient will carry at least 10# for return to work tasks (Ongoing)       Start:  06/18/25    Expected End:  08/13/25               Pain       Patient will report pain of < 2/10 demonstrating a reduction of overall pain (Ongoing)       Start:  06/18/25    Expected End:  08/13/25            Patient will report a 2 point reduction in pain while performing daily tasks (Ongoing)       Start:  06/18/25    Expected End:  08/13/25               Range of Motion       Patient will achieve bilateral cervical rotation ROM > 60 degrees for daily tasks (Ongoing)       Start:  06/18/25    Expected End:  08/13/25            Patient will achieve cervical flexion ROM > 60 degrees (Ongoing)       Start:  06/18/25    Expected End:  08/13/25            Patient will achieve cervical extension ROM > 50 degrees for overhead tasks (Ongoing)       Start:  06/18/25    Expected End:  08/13/25                Joselo Caputo, PTA

## 2025-07-15 ENCOUNTER — OFFICE VISIT (OUTPATIENT)
Dept: URGENT CARE | Facility: CLINIC | Age: 60
End: 2025-07-15
Payer: COMMERCIAL

## 2025-07-15 VITALS
HEART RATE: 68 BPM | OXYGEN SATURATION: 98 % | DIASTOLIC BLOOD PRESSURE: 58 MMHG | TEMPERATURE: 98 F | RESPIRATION RATE: 16 BRPM | SYSTOLIC BLOOD PRESSURE: 90 MMHG

## 2025-07-15 DIAGNOSIS — F41.9 ANXIETY: ICD-10-CM

## 2025-07-15 DIAGNOSIS — Z02.6 ENCOUNTER RELATED TO WORKER'S COMPENSATION CLAIM: ICD-10-CM
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
## 2025-07-15 DIAGNOSIS — S16.1XXA CERVICAL MYOFASCIAL STRAIN, INITIAL ENCOUNTER: Primary | ICD-10-CM

## 2025-07-15 PROCEDURE — 99214 OFFICE O/P EST MOD 30 MIN: CPT | Mod: S$GLB,,, | Performed by: SURGERY

## 2025-07-15 RX ORDER — IBUPROFEN 600 MG/1
600 TABLET, FILM COATED ORAL EVERY 6 HOURS PRN
Qty: 90 TABLET | Refills: 1 | Status: SHIPPED | OUTPATIENT
Start: 2025-07-15 | End: 2025-08-29

## 2025-07-15 RX ORDER — LORAZEPAM 0.5 MG/1
0.5 TABLET ORAL EVERY 12 HOURS PRN
Qty: 30 TABLET | Refills: 5 | Status: SHIPPED | OUTPATIENT
Start: 2025-07-15

## 2025-07-15 RX ORDER — METHOCARBAMOL 500 MG/1
500 TABLET, FILM COATED ORAL 3 TIMES DAILY
Qty: 90 TABLET | Refills: 1 | Status: SHIPPED | OUTPATIENT
Start: 2025-07-15 | End: 2025-09-13

## 2025-07-15 NOTE — TELEPHONE ENCOUNTER
No care due was identified.  [facility] Embedded Care Due Messages. Reference number: [id number].   7/15/2025 1:19:10 PM CDT

## 2025-07-15 NOTE — PROGRESS NOTES
"Subjective:      Patient ID: Nina Cuba is a 60 y.o. female.    Chief Complaint: No chief complaint on file.    Patient's place of employment - [employer]  Patient's job title - Coordinator  Date of injury - 05/10/25  Body part injured including left or right - Neck  Current work status per last visit - Light Duty- No above the shoulder/overhead work, Sit or stand as needed, No lifting/pushing/pulling more than 25 lbs (Allow frequent breaks. Reduced shift length, no more than 6 consecutive hours per shift.)  Improved, same, or worse - Same  Pain Scale right now (1-10) -  5/10        Neck: Positive for neck stiffness.   Neurological:  Positive for headaches and numbness.     See MA note above. Begin MD note:    Nina Cuba is a 60 y.o. presenting for follow-up of neck injury.  Recommendations from last visit: PT, steroid, NSAID, muscle relaxer  Today, she reports that she is having improvements with PT and the modalities they are using to address her neck. She attends PT twice weekly and is doing the HEP provided by PT daily. The left side of her neck continues to experience some stiffness and occasional spasms. The spasms are improved with use of the muscle relaxer. She is having headaches less frequently, they typically start from the left side of neck.   The steroid pack prescribed at last OV "gave things a jumpstart", it helped a lot. She continues to use the ibuprofen and muscle relaxer daily. She'll take two ibuprofen in the morning before work and one at night before bedtime. She typically takes the muscle relaxer twice a day, morning before work and before bedtime, but if she is standing a lot and feeling achy she will take it at lunchtime as well. The muscle relaxer does not cause her to become drowsy.   She is currently working 6 hour shifts and notes she is able to do so without any increased difficulty. Sometimes she'll work six days in a row but that doesn't bother her.     Objective: " "    Physical Exam  Vitals and nursing note reviewed.   Constitutional:       General: She is not in acute distress.     Appearance: She is not ill-appearing.   HENT:      Head: Normocephalic.   Eyes:      Conjunctiva/sclera: Conjunctivae normal.   Neck:        Comments: Improved ROM, notable with rotation to right and extension. .  Reports some discomfort with palpation of the right neck and trapezius, but no pain. Pain noted with palpation of the left proximal trapezius. There is generalized muscle tension appreciated to bilateral trapezii.  Pulmonary:      Effort: Pulmonary effort is normal. No respiratory distress.   Musculoskeletal:      Cervical back: Muscular tenderness present. No pain with movement or spinous process tenderness. Normal range of motion.   Skin:     General: Skin is warm.      Coloration: Skin is not pale.   Neurological:      General: No focal deficit present.      Mental Status: She is alert and oriented to person, place, and time.      GCS: GCS eye subscore is 4. GCS verbal subscore is 5. GCS motor subscore is 6.      Motor: Motor function is intact.      Coordination: Coordination is intact.   Psychiatric:         Attention and Perception: Attention normal.         Mood and Affect: Mood normal.         Speech: Speech normal.         Behavior: Behavior normal. Behavior is cooperative.         Thought Content: Thought content normal.        Assessment:      1. Cervical myofascial strain, initial encounter    2. Encounter related to worker's compensation claim    3. Fall, initial encounter      Plan:     I reviewed the PT notes related to this injury, "Patient able to tolerate all exercise interventions this date and appears to be making very good progress with range of motion and mobility." Continue PT, maintain current work restrictions, plan to begin reducing them on next follow-up. Medication refills provided. Follow-up in 3 weeks.       Medications Ordered This Encounter   Medications    " ibuprofen (ADVIL,MOTRIN) 600 MG tablet     Sig: Take 1 tablet (600 mg total) by mouth every 6 (six) hours as needed for Pain.     Dispense:  90 tablet     Refill:  1    methocarbamoL (ROBAXIN) 500 MG Tab     Sig: Take 1 tablet (500 mg total) by mouth 3 (three) times daily.     Dispense:  90 tablet     Refill:  1     Diagnoses and plan discussed with the patient, as well as the expected course and duration of symptoms. Risks and benefits of any medication prescribed during this visit was explained, verbal instructions on use given. Clinic/Emergency department return precautions were given, can return to [facility] before scheduled follow-up appointment if notes worsening/aggravation of symptoms. All questions and concerns were addressed prior to discharge. Plan was developed with active input from the patient and they verbalized understanding of and agreement with the POC.  [facility] was informed of any referrals and relevant orders.  Note was dictated with voice recognition software, please excuse any grammatical errors.    I spent a total of 30 minutes on the day of the visit.  This includes face to face time and non-face to face time preparing to see the patient (e.g. review of medical record), obtaining and/or reviewing separately obtained history, documenting clinical information in the electronic or other health record, independently interpreting results and communicating results to the patient/family/caregiver, or care coordinator.    Patient Instructions: Continue Physical Therapy (Ask PT about dry-needling)   Restrictions: No above the shoulder/overhead work, No lifting/pushing/pulling more than 25 lbs, Sit or stand as needed (Allow frequent breaks. Reduced shift length, no more than 6 consecutive hours per shift.)  Follow up in about 3 weeks (around 8/5/2025).

## 2025-07-15 NOTE — LETTER
[facility]  [address] [place] [address]  Phone: [phone]  Fax: [phone]  [facility] [employer]: [phone]    Pt Name: Nina Cuba  Injury Date: 05/10/2025   Employee ID: [id number] Date of Treatment: 07/15/2025   Company: [id number]      Appointment Time: 02:00 PM Arrived: 02:05 PM   Provider: Rudy Ledesma MD Time Out:03:05 PM     Office Treatment:   1. Cervical myofascial strain, initial encounter    2. Encounter related to worker's compensation claim    3. Fall, initial encounter      Medications Ordered This Encounter   Medications    ibuprofen (ADVIL,MOTRIN) 600 MG tablet    methocarbamoL (ROBAXIN) 500 MG Tab        Patient Instructions: Continue Physical Therapy (Ask PT about dry-needling)      Restrictions: No above the shoulder/overhead work, No lifting/pushing/pulling more than 25 lbs, Sit or stand as needed (Allow frequent breaks. Reduced shift length, no more than 6 consecutive hours per shift.)     Return Appointment: 8/5/2025 at 2:00 PM.  MANJU.

## 2025-07-16 ENCOUNTER — CLINICAL SUPPORT (OUTPATIENT)
Dept: REHABILITATION | Facility: HOSPITAL | Age: 60
End: 2025-07-16
Payer: COMMERCIAL

## 2025-07-16 DIAGNOSIS — R29.898 DECREASED ROM OF NECK: Primary | ICD-10-CM

## 2025-07-16 PROCEDURE — 97140 MANUAL THERAPY 1/> REGIONS: CPT | Mod: CQ

## 2025-07-16 PROCEDURE — 97112 NEUROMUSCULAR REEDUCATION: CPT | Mod: CQ

## 2025-07-16 NOTE — PROGRESS NOTES
Outpatient Rehab    Physical Therapy Visit    Patient Name: Nina Cuba  MRN: [id number]  YOB: 1965  Encounter Date: 7/16/2025    Therapy Diagnosis:   Encounter Diagnosis   Name Primary?    Decreased ROM of neck Yes     Physician: Rudy Ledesma MD    Physician Orders: Eval and Treat  Medical Diagnosis: Encounter related to worker's compensation claim  Fall, initial encounter  Cervical myofascial strain, initial encounter  Surgical Diagnosis: Not applicable for this Episode   Surgical Date: Not applicable for this Episode  Days Since Last Surgery: Not applicable for this Episode    Visit # / Visits Authorized:  5 / 12  Insurance Authorization Period: 6/10/2025 to 6/9/2026  Date of Evaluation: 6/18/2025  Plan of Care Certification: 6/25/2025 to 8/13/2025      PT/PTA: PTA   Number of PTA visits since last PT visit:2  Time In: 1610   Time Out: 1710  Total Time (in minutes): 60   Total Billable Time (in minutes): 60    FOTO:  Intake Score: Not applicable for this Episode%  Survey Score 2: Not applicable for this Episode%  Survey Score 3: Not applicable for this Episode%    Precautions:         Subjective   that MD suggested dry needling to reduce her cervical pain.  Pain reported as 5/10.      Objective            Treatment:  Manual Therapy  MT 2: Cervical Distraction  MT 3: STM to cervical paraspinals, suboccipital mm, levator scap, upper trap  Balance/Neuromuscular Re-Education  NMR 1: Education on hep, poc, functional anatomy, and activity modification/load progressions  NMR 2: Supine cervical chin tuck 5 sec hold 2x15 reps; (repeated pre-manual) Supine cervical chin tuck 5 sec hold 2x15 reps  NMR 3: Scapular Squeeze 5 sec hold x 15 reps;  NMR 4: rotational SNAGS with towel, R rotation only: 2x10 with 5 sec holds; extensions SNAGS with towel: 2x10 with 5 sec holds  NMR 5: levator scap stretch, L side only: 3x30 sec; upper trap, L side only: 3x30 sec  NMR 6: Standing rows and extensions GTB 5  sec hold 3 x 10 each  NMR 7: Standing chin tucks + horz abd, red tb: 1 x 15 with 5 sec hold    Time Entry(in minutes):  Manual Therapy Time Entry: 10  Neuromuscular Re-Education Time Entry: 50    Assessment & Plan   Assessment: Patient visited MD who ordered her to maintain current work restrictions with a plan to begin reducing them on next follow-up in 3 weeks. Patient reported that MD also recommended dry needling to cervical mm in order to reduce cervical pain/symptoms. Patient able to tolerate all exercise interventions this date and appears to be making progress with range of motion and mobility. Patient also continued to perform resistance exercises which focused on periscapular strengthening.       The patient will continue to benefit from skilled outpatient physical therapy in order to address the deficits listed in the problem list on the initial evaluation, provide patient and family education, and maximize the patients level of independence in the home and community environments.     The patient's spiritual, cultural, and educational needs were considered, and the patient is agreeable to the plan of care and goals.           Plan: Will continue per POC towards treatment goals. PT/PTA met face to face to discuss patient's treatment plan and progress towards established goals. Patient will be seen by physical therapist every sixth visit and minimally once per month.    Goals:   Active       Functional outcome       Patient will show a significant change in FOTO patient-reported outcome tool to demonstrate subjective improvement (Ongoing)       Start:  06/18/25    Expected End:  08/13/25            Patient stated goal: get back to work  (Ongoing)       Start:  06/18/25    Expected End:  08/13/25            Patient will demonstrate independence in home program for support of progression (Ongoing)       Start:  06/18/25    Expected End:  08/13/25               Hand and arm use       Patient will reach  overhead at least 10x with < 2/10 pain for return to work tasks (Ongoing)       Start:  06/18/25    Expected End:  08/13/25               Lifting & carrying objects       Patient will lift at least 10# for return to work tasks (Ongoing)       Start:  06/18/25    Expected End:  08/13/25            Patient will carry at least 10# for return to work tasks (Ongoing)       Start:  06/18/25    Expected End:  08/13/25               Pain       Patient will report pain of < 2/10 demonstrating a reduction of overall pain (Ongoing)       Start:  06/18/25    Expected End:  08/13/25            Patient will report a 2 point reduction in pain while performing daily tasks (Ongoing)       Start:  06/18/25    Expected End:  08/13/25               Range of Motion       Patient will achieve bilateral cervical rotation ROM > 60 degrees for daily tasks (Ongoing)       Start:  06/18/25    Expected End:  08/13/25            Patient will achieve cervical flexion ROM > 60 degrees (Ongoing)       Start:  06/18/25    Expected End:  08/13/25            Patient will achieve cervical extension ROM > 50 degrees for overhead tasks (Ongoing)       Start:  06/18/25    Expected End:  08/13/25                Joselo Caputo, PTA

## 2025-07-18 ENCOUNTER — OFFICE VISIT (OUTPATIENT)
Dept: OBSTETRICS AND GYNECOLOGY | Facility: CLINIC | Age: 60
End: 2025-07-18
Payer: COMMERCIAL

## 2025-07-18 VITALS
DIASTOLIC BLOOD PRESSURE: 64 MMHG | HEIGHT: 67 IN | WEIGHT: 179.88 LBS | SYSTOLIC BLOOD PRESSURE: 112 MMHG | BODY MASS INDEX: 28.23 KG/M2

## 2025-07-18 DIAGNOSIS — Z00.00 ANNUAL PHYSICAL EXAM: ICD-10-CM

## 2025-07-18 DIAGNOSIS — Z01.419 ENCOUNTER FOR GYNECOLOGICAL EXAMINATION WITHOUT ABNORMAL FINDING: Primary | ICD-10-CM

## 2025-07-18 DIAGNOSIS — N95.2 VAGINAL ATROPHY: ICD-10-CM

## 2025-07-18 PROCEDURE — 99999 PR PBB SHADOW E&M-EST. PATIENT-LVL III: CPT | Mod: PBBFAC,,, | Performed by: OBSTETRICS & GYNECOLOGY

## 2025-07-18 RX ORDER — PRASTERONE 6.5 MG/1
6.5 INSERT VAGINAL DAILY
Qty: 30 EACH | Refills: 12 | Status: SHIPPED | OUTPATIENT
Start: 2025-07-18 | End: 2025-08-15

## 2025-07-18 NOTE — PROGRESS NOTES
"CC: Well woman exam    Nina Cuba is a 60 y.o. female  presents for a well woman exam.  No issues, problems, or complaints.    She stopped HRT and doing better off the HRT  Has vaginal dryness.  She did not do well with estrace cream    Past Medical History:   Diagnosis Date    Anxiety     Back problem     GERD (gastroesophageal reflux disease)     Hiatal hernia     Obesity (BMI 30-39.9)      Past Surgical History:   Procedure Laterality Date    COLONOSCOPY N/A 11/3/2022    Procedure: COLONOSCOPY;  Surgeon: Mihir Roberson MD;  Location: [facility] ([address] [place] [address]);  Service: Endoscopy;  Laterality: N/A;    ESOPHAGOGASTRODUODENOSCOPY N/A 11/3/2022    Procedure: EGD (ESOPHAGOGASTRODUODENOSCOPY);  Surgeon: Mihir Roberson MD;  [facility] ([facility]  Service: Endoscopy;  Laterality: N/A;  rapid-inst email am prep-tb-order re-created from  order-ray only  pre call done, no answer  verified arrival time 9am    TONSILLECTOMY       Family History   Problem Relation Name Age of Onset    Diabetes Mother      Hypertension Mother      Lymphoma Father      Kidney cancer Brother      Heart disease Maternal Grandfather      Stroke Neg Hx       Social History[1]  OB History          0    Para   0    Term   0       0    AB   0    Living   0         SAB   0    IAB   0    Ectopic   0    Multiple   0    Live Births   0                 /64   Ht 5' 7" (1.702 m)   Wt 81.6 kg (179 lb 14.3 oz)   LMP 2018   BMI 28.18 kg/m²     ROS:  GENERAL: Denies weight gain or weight loss. Feeling well overall.   SKIN: Denies rash or lesions.   HEAD: Denies head injury or headache.   NODES: Denies enlarged lymph nodes.   CHEST: Denies chest pain or shortness of breath.   CARDIOVASCULAR: Denies palpitations or left sided chest pain.   ABDOMEN: No abdominal pain, constipation, diarrhea, nausea, vomiting or rectal bleeding.   URINARY: No frequency, dysuria, hematuria, or burning on urination.  REPRODUCTIVE: See HPI. "   BREASTS: The patient performs breast self-examination and denies pain, lumps, or nipple discharge.   HEMATOLOGIC: No easy bruisability or excessive bleeding.   MUSCULOSKELETAL: Denies joint pain or swelling.   NEUROLOGIC: Denies syncope or weakness.   PSYCHIATRIC: Denies depression, anxiety or mood swings.    PE:   APPEARANCE: Well nourished, well developed, in no acute distress.  AFFECT: WNL, alert and oriented x 3.  SKIN: No acne or hirsutism.  NECK: Neck symmetric without masses or thyromegaly.  NODES: No inguinal, cervical, axillary or femoral lymph node enlargement.  CHEST: Good respiratory effort.   ABDOMEN: Soft. No tenderness or masses. No hepatosplenomegaly. No hernias.  BREASTS: Symmetrical, no skin changes or visible lesions. No palpable masses, nipple discharge bilaterally.  PELVIC: Normal external female genitalia without lesions. Normal hair distribution. Adequate perineal body, normal urethral meatus. Vagina atrophic without lesions or discharge. No significant cystocele or rectocele. Bimanual exam shows uterus and cervix to be surgically absent. Adnexa without masses or tenderness.  RECTAL: Rectovaginal exam confirms above with normal sphincter tone, no masses.  EXTREMITIES: No edema.      ICD-10-CM ICD-9-CM    1. Encounter for gynecological examination without abnormal finding  Z01.419 V72.31       2. Annual physical exam  Z00.00 V70.0 Ambulatory referral/consult to Gynecology      Liquid-Based Pap Smear, Screening      3. Vaginal atrophy  N95.2 627.3 prasterone, DHEA, (INTRAROSA) 6.5 mg Inst            Revaree  Bonafide vitamins    Patient was counseled today on A.C.S. Pap guidelines and recommendations for yearly pelvic exams, mammograms and monthly self breast exams; to see her PCP for other health maintenance.     Follow up if symptoms worsen or fail to improve.                         [1]   Social History  Tobacco Use    Smoking status: Former     Passive exposure: Never    Smokeless tobacco:  Never   Substance Use Topics    Alcohol use: Yes     Comment: rare    Drug use: Never

## 2025-07-23 ENCOUNTER — CLINICAL SUPPORT (OUTPATIENT)
Dept: REHABILITATION | Facility: HOSPITAL | Age: 60
End: 2025-07-23
Payer: COMMERCIAL

## 2025-07-23 DIAGNOSIS — R29.898 DECREASED ROM OF NECK: Primary | ICD-10-CM

## 2025-07-23 PROCEDURE — 97530 THERAPEUTIC ACTIVITIES: CPT

## 2025-07-23 PROCEDURE — 97112 NEUROMUSCULAR REEDUCATION: CPT

## 2025-07-23 PROCEDURE — 97140 MANUAL THERAPY 1/> REGIONS: CPT

## 2025-07-25 ENCOUNTER — CLINICAL SUPPORT (OUTPATIENT)
Dept: REHABILITATION | Facility: HOSPITAL | Age: 60
End: 2025-07-25
Payer: COMMERCIAL

## 2025-07-25 DIAGNOSIS — R29.898 DECREASED ROM OF NECK: Primary | ICD-10-CM

## 2025-07-25 PROCEDURE — 97112 NEUROMUSCULAR REEDUCATION: CPT

## 2025-07-25 PROCEDURE — 97140 MANUAL THERAPY 1/> REGIONS: CPT

## 2025-07-25 PROCEDURE — 97530 THERAPEUTIC ACTIVITIES: CPT

## 2025-07-28 ENCOUNTER — CLINICAL SUPPORT (OUTPATIENT)
Dept: REHABILITATION | Facility: HOSPITAL | Age: 60
End: 2025-07-28
Payer: COMMERCIAL

## 2025-07-28 DIAGNOSIS — R29.898 DECREASED ROM OF NECK: Primary | ICD-10-CM

## 2025-07-28 PROCEDURE — 97140 MANUAL THERAPY 1/> REGIONS: CPT

## 2025-07-28 PROCEDURE — 97530 THERAPEUTIC ACTIVITIES: CPT

## 2025-07-28 PROCEDURE — 97112 NEUROMUSCULAR REEDUCATION: CPT

## 2025-07-30 ENCOUNTER — CLINICAL SUPPORT (OUTPATIENT)
Dept: REHABILITATION | Facility: HOSPITAL | Age: 60
End: 2025-07-30
Payer: COMMERCIAL

## 2025-07-30 DIAGNOSIS — R29.898 DECREASED ROM OF NECK: Primary | ICD-10-CM

## 2025-07-30 PROCEDURE — 97112 NEUROMUSCULAR REEDUCATION: CPT | Mod: CQ

## 2025-07-30 PROCEDURE — 97530 THERAPEUTIC ACTIVITIES: CPT | Mod: CQ

## 2025-07-30 PROCEDURE — 97140 MANUAL THERAPY 1/> REGIONS: CPT | Mod: CQ

## 2025-07-30 NOTE — PROGRESS NOTES
Outpatient Rehab    Physical Therapy Progress Note    Patient Name: Nina Cuba  MRN: [id number]  YOB: 1965  Encounter Date: 7/23/2025    Therapy Diagnosis:   Encounter Diagnosis   Name Primary?    Decreased ROM of neck Yes     Physician: Rudy Ledesma MD    Physician Orders: Eval and Treat  Medical Diagnosis: Encounter related to worker's compensation claim  Fall, initial encounter  Cervical myofascial strain, initial encounter  Surgical Diagnosis: Not applicable for this Episode   Surgical Date: Not applicable for this Episode  Days Since Last Surgery: Not applicable for this Episode    Visit # / Visits Authorized:  8 / 12  Insurance Authorization Period: 6/10/2025 to 6/9/2026  Date of Evaluation: 6/18/2025  Plan of Care Certification: 6/25/2025 to 8/13/2025      PT/PTA: PT   Number of PTA visits since last PT visit:0  Time In: 1431   Time Out: 1533  Total Time (in minutes): 62   Total Billable Time (in minutes): 62    FOTO:  Intake Score (%): Not applicable for this Episode  Survey Score 2 (%): Not applicable for this Episode  Survey Score 3 (%): Not applicable for this Episode    Precautions:       Subjective   patient reports that she feels like she is doing much since she first started. She states that she is no longer having the headaches or symptoms into her arm. She feels like overall she has made significant improvement..  Pain reported as 3/10.      Objective          Observation: Patient presents independently     Cervical Range of Motion:     Degrees   Flexion 34* (tightens up); 61   Extension 28; 52   Right Rotation 60; 68   Left Rotation 53 (rigidity); 66 smooth    Right Side Bending 32; no change   Left Side Bending 33; no change      Shoulder Range of Motion: within normal limits      Upper Extremity Strength  (R) UE   (L) UE     Shoulder flexion: 4+/5 Shoulder flexion: 4+/5   Shoulder Abduction: 4/5 Shoulder abduction: 4/5   Shoulder ER 4+/5 Shoulder ER 4+/5   Shoulder IR  5/5 Shoulder IR 5/5         Special Tests:  Distraction Pos; Neg   Spurlings Neg   Vertebral Artery Neg   Pierre Neg   Lateral Flexion Alar Ligament Neg   Transverse Ligament Neg   Shoulder Abduction Test Neg   Quadrant Testing Neg      Cervical joint mobility: within normal limits        Thoracic mobility: hypomobile     Palpation: tenderness near suboccipitals        Sensation: intact     Neural tension:   -ULTT Median: Neg  -ULTT Ulnar: Neg  -ULTT Radial: Neg    Treatment:  Manual Therapy  MT 1: Grade I-III Cervical Mobs  MT 2: Cervical Distraction  MT 3: STM to cervical paraspinals, suboccipital mm, levator scap, upper trap  Balance/Neuromuscular Re-Education  NMR 1: Education on hep, poc, functional anatomy, and activity modification/load progressions  NMR 2: Supine cervical chin tuck 5 sec hold 2x15 reps; (repeated pre-manual) Supine cervical chin tuck 5 sec hold 2x15 reps  NMR 3: Scapular Squeeze 5 sec hold x 15 reps;  NMR 4: rotational SNAGS with towel, R rotation only: 2x10 with 5 sec holds; extensions SNAGS with towel: 2x10 with 5 sec holds  NMR 5: levator scap stretch, L side only: 3x30 sec; upper trap, L side only: 3x30 sec  NMR 6: Standing rows and extensions GTB 5 sec hold 3 x 10 each  NMR 7: Standing chin tucks + horz abd, red tb: 1 x 15 with 5 sec hold  NMR 8: Supine chin tucks + 3# cane + shoulder flexion 3 sec hold x 20 reps  Therapeutic Activity  TA 1: ube 4/4  TA 2: Reassessment    Time Entry(in minutes):  Manual Therapy Time Entry: 8  Neuromuscular Re-Education Time Entry: 38  Therapeutic Activity Time Entry: 16    Assessment & Plan   Assessment: Nina has attended physical therapy for 5 visits for neck pain and is progressing well with therapy interventions. She has demonstrated improvement with range of motion, strength, and FOTO score. Patient's main functional limitation remains end range pain free range of motion, gross strength, and return to work tasks such as lifting and reaching  overhead as compared to normative values and contralateral side. Patient will benefit from continued skilled therapy to address these remaining deficits and return to prior level of function.        The patient will continue to benefit from skilled outpatient physical therapy in order to address the deficits listed in the problem list on the initial evaluation, provide patient and family education, and maximize the patients level of independence in the home and community environments.     The patient's spiritual, cultural, and educational needs were considered, and the patient is agreeable to the plan of care and goals.           Plan: Will continue per POC towards treatment goals. PT/PTA met face to face to discuss patient's treatment plan and progress towards established goals. Patient will be seen by physical therapist every sixth visit and minimally once per month.    Goals:   Active       Functional outcome       Patient will show a significant change in FOTO patient-reported outcome tool to demonstrate subjective improvement (Met)       Start:  06/18/25    Expected End:  08/13/25    Resolved:  07/30/25         Patient stated goal: get back to work  (Ongoing)       Start:  06/18/25    Expected End:  08/13/25            Patient will demonstrate independence in home program for support of progression (Met)       Start:  06/18/25    Expected End:  08/13/25    Resolved:  07/30/25            Hand and arm use       Patient will reach overhead at least 10x with < 2/10 pain for return to work tasks (Ongoing)       Start:  06/18/25    Expected End:  08/13/25               Lifting & carrying objects       Patient will lift at least 10# for return to work tasks (Ongoing)       Start:  06/18/25    Expected End:  08/13/25            Patient will carry at least 10# for return to work tasks (Ongoing)       Start:  06/18/25    Expected End:  08/13/25               Pain       Patient will report pain of < 2/10 demonstrating a  reduction of overall pain (Ongoing)       Start:  06/18/25    Expected End:  08/13/25            Patient will report a 2 point reduction in pain while performing daily tasks (Met)       Start:  06/18/25    Expected End:  08/13/25    Resolved:  07/30/25           Resolved       Range of Motion       Patient will achieve bilateral cervical rotation ROM > 60 degrees for daily tasks (Met)       Start:  06/18/25    Expected End:  08/13/25    Resolved:  07/30/25         Patient will achieve cervical flexion ROM > 60 degrees (Met)       Start:  06/18/25    Expected End:  08/13/25    Resolved:  07/30/25         Patient will achieve cervical extension ROM > 50 degrees for overhead tasks (Met)       Start:  06/18/25    Expected End:  08/13/25    Resolved:  07/30/25             Ritu Rasmussen PT

## 2025-07-30 NOTE — PROGRESS NOTES
Outpatient Rehab    Physical Therapy Visit    Patient Name: Nina Cuba  MRN: [id number]  YOB: 1965  Encounter Date: 7/25/2025    Therapy Diagnosis:   Encounter Diagnosis   Name Primary?    Decreased ROM of neck Yes     Physician: Rudy Ledesma MD    Physician Orders: Eval and Treat  Medical Diagnosis: Encounter related to worker's compensation claim  Fall, initial encounter  Cervical myofascial strain, initial encounter  Surgical Diagnosis: Not applicable for this Episode   Surgical Date: Not applicable for this Episode  Days Since Last Surgery: Not applicable for this Episode    Visit # / Visits Authorized:  7 / 12  Insurance Authorization Period: 6/10/2025 to 6/9/2026  Date of Evaluation: 6/18/2025  Plan of Care Certification: 6/25/2025 to 8/13/2025      PT/PTA: PT   Number of PTA visits since last PT visit:0  Time In: 0901   Time Out: 1000  Total Time (in minutes): 59   Total Billable Time (in minutes): 59    FOTO:  Intake Score (%): Not applicable for this Episode  Survey Score 2 (%): Not applicable for this Episode  Survey Score 3 (%): Not applicable for this Episode    Precautions:         Subjective   no new complaints. States that she is happy with how well she is doing and feeling better..  Pain reported as 2/10.      Objective            Treatment:  Manual Therapy  MT 1: Grade I-III Cervical Mobs  MT 2: Cervical Distraction  MT 3: STM to cervical paraspinals, suboccipital mm, levator scap, upper trap  Balance/Neuromuscular Re-Education  NMR 1: Education on hep, poc, functional anatomy, and activity modification/load progressions  NMR 2: Supine cervical chin tuck 5 sec hold 2x15 reps; (repeated pre-manual) Supine cervical chin tuck 5 sec hold 2x15 reps  NMR 3: Scapular Squeeze 5 sec hold x 15 reps;  NMR 6: Standing rows and extensions GTB 5 sec hold 3 x 10 each  NMR 7: Standing chin tucks + horz abd, red tb: 1 x 15 with 5 sec hold  NMR 8: Supine chin tucks + 3# cane + shoulder  flexion 3 sec hold x 20 reps  Therapeutic Activity  TA 1: ube 4/4  TA 2: standing flexion/abduction/scaption 1# 2 x 10 each to 90 degrees (opposite ue holding at 90 degrees)  TA 3: SA wall slides 2 x 10    Time Entry(in minutes):  Manual Therapy Time Entry: 8  Neuromuscular Re-Education Time Entry: 39  Therapeutic Activity Time Entry: 12    Assessment & Plan   Assessment: Progressed this date with SA wall slides, increased load on theraband for scapular strengthening, and added standing shoulder flexion/scaption/and abduction for gross strength in preparation for lifting and reaching tasks. Able to perform but notable fatigue post session. Will continue to progress as tolerated.        The patient will continue to benefit from skilled outpatient physical therapy in order to address the deficits listed in the problem list on the initial evaluation, provide patient and family education, and maximize the patients level of independence in the home and community environments.     The patient's spiritual, cultural, and educational needs were considered, and the patient is agreeable to the plan of care and goals.           Plan: progress with overhead reaching and lifting tasks    Goals:   Active       Functional outcome       Patient will show a significant change in FOTO patient-reported outcome tool to demonstrate subjective improvement (Met)       Start:  06/18/25    Expected End:  08/13/25    Resolved:  07/30/25         Patient stated goal: get back to work  (Ongoing)       Start:  06/18/25    Expected End:  08/13/25            Patient will demonstrate independence in home program for support of progression (Met)       Start:  06/18/25    Expected End:  08/13/25    Resolved:  07/30/25            Hand and arm use       Patient will reach overhead at least 10x with < 2/10 pain for return to work tasks (Ongoing)       Start:  06/18/25    Expected End:  08/13/25               Lifting & carrying objects       Patient will  lift at least 10# for return to work tasks (Ongoing)       Start:  06/18/25    Expected End:  08/13/25            Patient will carry at least 10# for return to work tasks (Ongoing)       Start:  06/18/25    Expected End:  08/13/25               Pain       Patient will report pain of < 2/10 demonstrating a reduction of overall pain (Ongoing)       Start:  06/18/25    Expected End:  08/13/25            Patient will report a 2 point reduction in pain while performing daily tasks (Met)       Start:  06/18/25    Expected End:  08/13/25    Resolved:  07/30/25           Resolved       Range of Motion       Patient will achieve bilateral cervical rotation ROM > 60 degrees for daily tasks (Met)       Start:  06/18/25    Expected End:  08/13/25    Resolved:  07/30/25         Patient will achieve cervical flexion ROM > 60 degrees (Met)       Start:  06/18/25    Expected End:  08/13/25    Resolved:  07/30/25         Patient will achieve cervical extension ROM > 50 degrees for overhead tasks (Met)       Start:  06/18/25    Expected End:  08/13/25    Resolved:  07/30/25             Ritu Rasmussen PT

## 2025-07-30 NOTE — PROGRESS NOTES
"  Outpatient Rehab    Physical Therapy Visit    Patient Name: Nina Cuba  MRN: [id number]  YOB: 1965  Encounter Date: 7/28/2025    Therapy Diagnosis:   Encounter Diagnosis   Name Primary?    Decreased ROM of neck Yes     Physician: Rudy Ledesma MD    Physician Orders: Eval and Treat  Medical Diagnosis: Encounter related to worker's compensation claim  Fall, initial encounter  Cervical myofascial strain, initial encounter  Surgical Diagnosis: Not applicable for this Episode   Surgical Date: Not applicable for this Episode  Days Since Last Surgery: Not applicable for this Episode    Visit # / Visits Authorized:  8 / 12  Insurance Authorization Period: 6/10/2025 to 6/9/2026  Date of Evaluation: 6/18/2025  Plan of Care Certification: 6/25/2025 to 8/13/2025      PT/PTA: PT   Number of PTA visits since last PT visit:0  Time In: 1501   Time Out: 1600  Total Time (in minutes): 59   Total Billable Time (in minutes): 59    FOTO:  Intake Score (%): Not applicable for this Episode  Survey Score 2 (%): Not applicable for this Episode  Survey Score 3 (%): Not applicable for this Episode    Precautions:         Subjective   Patient reports that she is feeling a little discomfort today but is not sure if its because she slept funny or because she attempted to lift a "witch pot" at work to put on a higher shelf and this was the first time she reall lifted. She states that she wanted to try to see how she would do. Denies any pain at the time and did not notice anything until this morning..  Pain reported as 3/10.      Objective            Treatment:  Manual Therapy  MT 1: Grade I-III Cervical Mobs  MT 2: Cervical Distraction  MT 3: STM to cervical paraspinals, suboccipital mm, levator scap, upper trap  Balance/Neuromuscular Re-Education  NMR 1: Education on hep, poc, functional anatomy, and activity modification/load progressions  NMR 2: Supine cervical chin tuck 5 sec hold 2x15 reps; (repeated pre-manual) " Supine cervical chin tuck 5 sec hold 2x15 reps  NMR 3: Scapular Squeeze 5 sec hold x 15 reps;  NMR 4: rotational SNAGS with towel, R rotation only: 2x10 with 5 sec holds; extensions SNAGS with towel: 2x10 with 5 sec holds  NMR 5: levator scap stretch, L side only: 3x30 sec; upper trap, L side only: 3x30 sec  NMR 6: Standing rows and extensions GTB 5 sec hold 3 x 10 each  NMR 7: Standing chin tucks + horz abd, red tb: 1 x 15 with 5 sec hold  NMR 8: Supine chin tucks + 3# cane + shoulder flexion 3 sec hold x 20 reps  Therapeutic Activity  TA 1: ube 4/4  TA 2: standing flexion/abduction/scaption 1# 2 x 10 each to 90 degrees (opposite ue holding at 90 degrees)  TA 3: SA wall slides 2 x 10    Time Entry(in minutes):  Manual Therapy Time Entry: 8  Neuromuscular Re-Education Time Entry: 39  Therapeutic Activity Time Entry: 12    Assessment & Plan   Assessment: No changes since last therapy session since progressions were made then. Patient was able to perform all interventions with good challenge and tolerance. Notable fatigue which is expected at this time. Discussed course of increased activity could increase symptoms but overall patient had a positive response to her first heavy lifting at work. Will continue to focus on this in upcoming sessions to ensure good mechanics and decrease risk for re-injury.        The patient will continue to benefit from skilled outpatient physical therapy in order to address the deficits listed in the problem list on the initial evaluation, provide patient and family education, and maximize the patients level of independence in the home and community environments.     The patient's spiritual, cultural, and educational needs were considered, and the patient is agreeable to the plan of care and goals.           Plan: progress with overhead reaching and lifting tasks    Goals:   Active       Functional outcome       Patient will show a significant change in FOTO patient-reported outcome  tool to demonstrate subjective improvement (Met)       Start:  06/18/25    Expected End:  08/13/25    Resolved:  07/30/25         Patient stated goal: get back to work  (Ongoing)       Start:  06/18/25    Expected End:  08/13/25            Patient will demonstrate independence in home program for support of progression (Met)       Start:  06/18/25    Expected End:  08/13/25    Resolved:  07/30/25            Hand and arm use       Patient will reach overhead at least 10x with < 2/10 pain for return to work tasks (Ongoing)       Start:  06/18/25    Expected End:  08/13/25               Lifting & carrying objects       Patient will lift at least 10# for return to work tasks (Ongoing)       Start:  06/18/25    Expected End:  08/13/25            Patient will carry at least 10# for return to work tasks (Ongoing)       Start:  06/18/25    Expected End:  08/13/25               Pain       Patient will report pain of < 2/10 demonstrating a reduction of overall pain (Ongoing)       Start:  06/18/25    Expected End:  08/13/25            Patient will report a 2 point reduction in pain while performing daily tasks (Met)       Start:  06/18/25    Expected End:  08/13/25    Resolved:  07/30/25           Resolved       Range of Motion       Patient will achieve bilateral cervical rotation ROM > 60 degrees for daily tasks (Met)       Start:  06/18/25    Expected End:  08/13/25    Resolved:  07/30/25         Patient will achieve cervical flexion ROM > 60 degrees (Met)       Start:  06/18/25    Expected End:  08/13/25    Resolved:  07/30/25         Patient will achieve cervical extension ROM > 50 degrees for overhead tasks (Met)       Start:  06/18/25    Expected End:  08/13/25    Resolved:  07/30/25             Ritu Rasmussen, PT

## 2025-07-30 NOTE — PROGRESS NOTES
Outpatient Rehab    Physical Therapy Visit    Patient Name: Nina Cuba  MRN: [id number]  YOB: 1965  Encounter Date: 7/30/2025    Therapy Diagnosis:   Encounter Diagnosis   Name Primary?    Decreased ROM of neck Yes     Physician: Rudy Ledesma MD    Physician Orders: Eval and Treat  Medical Diagnosis: Encounter related to worker's compensation claim  Fall, initial encounter  Cervical myofascial strain, initial encounter  Surgical Diagnosis: Not applicable for this Episode   Surgical Date: Not applicable for this Episode  Days Since Last Surgery: Not applicable for this Episode    Visit # / Visits Authorized:  9 / 12  Insurance Authorization Period: 6/10/2025 to 6/9/2026  Date of Evaluation: 6/18/2025  Plan of Care Certification: 6/25/2025 to 8/13/2025      PT/PTA: PTA   Number of PTA visits since last PT visit:1  Time In: 1510   Time Out: 1620  Total Time (in minutes): 70   Total Billable Time (in minutes): 70    FOTO:  Intake Score (%): Not applicable for this Episode  Survey Score 2 (%): Not applicable for this Episode  Survey Score 3 (%): Not applicable for this Episode    Precautions:         Subjective   neck pain upon arrival but no headaches or numbness.  Pain reported as 4/10.      Objective            Treatment:  Manual Therapy  MT 2: Cervical Distraction  MT 3: STM to cervical paraspinals, suboccipital mm, levator scap, upper trap  Balance/Neuromuscular Re-Education  NMR 2: Supine cervical chin tuck 5 sec hold 2x15 reps; (repeated pre-manual) Supine cervical chin tuck 5 sec hold 2x15 reps  NMR 3: Scapular Squeeze 5 sec hold x 15 reps;  NMR 4: rotational SNAGS with towel, R rotation only: 1x15 with 5 sec holds; extensions SNAGS with towel: 1x15 with 5 sec holds  NMR 5: levator scap stretch, L side only: 3x30 sec; upper trap, L side only: 3x30 sec  NMR 6: Standing rows and extensions GTB 5 sec hold 3 x 10 each  Therapeutic Activity  TA 1: ube 4/4: 8 min total  TA 2: standing  flexion/abduction/scaption 1# 1x10 each to 90 degrees/UE  TA 4:  a 6 lbs, 6 lbs, 5 lbs & 5 lbs db at waist level and place on a shoulder height shelf and return to waist level shelf: 3x with BUE & 3x with UUE/side    Time Entry(in minutes):  Manual Therapy Time Entry: 10  Neuromuscular Re-Education Time Entry: 35  Therapeutic Activity Time Entry: 25    Assessment & Plan   Assessment: Patient tolerated treatment progression well as patient was able to lift 6 lbs to shoulder height shelf without any pain or discomfort. Patient continued to endorse fatigue which is expected at this time. Will continue to focus on this in upcoming sessions to ensure good mechanics and decrease risk for re-injury.        The patient will continue to benefit from skilled outpatient physical therapy in order to address the deficits listed in the problem list on the initial evaluation, provide patient and family education, and maximize the patients level of independence in the home and community environments.     The patient's spiritual, cultural, and educational needs were considered, and the patient is agreeable to the plan of care and goals.           Plan: progress with overhead reaching and lifting tasks    Goals:   Active       Functional outcome       Patient will show a significant change in FOTO patient-reported outcome tool to demonstrate subjective improvement (Met)       Start:  06/18/25    Expected End:  08/13/25    Resolved:  07/30/25         Patient stated goal: get back to work  (Ongoing)       Start:  06/18/25    Expected End:  08/13/25            Patient will demonstrate independence in home program for support of progression (Met)       Start:  06/18/25    Expected End:  08/13/25    Resolved:  07/30/25            Hand and arm use       Patient will reach overhead at least 10x with < 2/10 pain for return to work tasks (Ongoing)       Start:  06/18/25    Expected End:  08/13/25               Lifting & carrying  objects       Patient will lift at least 10# for return to work tasks (Ongoing)       Start:  06/18/25    Expected End:  08/13/25            Patient will carry at least 10# for return to work tasks (Ongoing)       Start:  06/18/25    Expected End:  08/13/25               Pain       Patient will report pain of < 2/10 demonstrating a reduction of overall pain (Ongoing)       Start:  06/18/25    Expected End:  08/13/25            Patient will report a 2 point reduction in pain while performing daily tasks (Met)       Start:  06/18/25    Expected End:  08/13/25    Resolved:  07/30/25           Resolved       Range of Motion       Patient will achieve bilateral cervical rotation ROM > 60 degrees for daily tasks (Met)       Start:  06/18/25    Expected End:  08/13/25    Resolved:  07/30/25         Patient will achieve cervical flexion ROM > 60 degrees (Met)       Start:  06/18/25    Expected End:  08/13/25    Resolved:  07/30/25         Patient will achieve cervical extension ROM > 50 degrees for overhead tasks (Met)       Start:  06/18/25    Expected End:  08/13/25    Resolved:  07/30/25             Joselo Caputo, PTA

## 2025-08-08 DIAGNOSIS — R10.9 ABDOMINAL SPASMS: ICD-10-CM

## 2025-08-08 RX ORDER — DICYCLOMINE HYDROCHLORIDE 20 MG/1
TABLET ORAL
Qty: 270 TABLET | Refills: 1 | Status: SHIPPED | OUTPATIENT
Start: 2025-08-08

## 2025-08-08 NOTE — TELEPHONE ENCOUNTER
Refill Routing Note   Medication(s) are not appropriate for processing by [facility] for the following reason(s):        Outside of protocol    [facility] action(s):  Route               Appointments  past 12m or future 3m with PCP    Date Provider   Last Visit   5/13/2025 Tomy Jackson, DO   Next Visit   11/18/2025 Tomy Jackson, DO   ED visits in past 90 days: 1        Note composed:7:51 AM 08/08/2025

## 2025-08-14 ENCOUNTER — OFFICE VISIT (OUTPATIENT)
Dept: URGENT CARE | Facility: CLINIC | Age: 60
End: 2025-08-14
Payer: COMMERCIAL

## 2025-08-14 VITALS
DIASTOLIC BLOOD PRESSURE: 66 MMHG | HEIGHT: 67 IN | SYSTOLIC BLOOD PRESSURE: 100 MMHG | HEART RATE: 67 BPM | BODY MASS INDEX: 28.09 KG/M2 | RESPIRATION RATE: 18 BRPM | OXYGEN SATURATION: 98 % | WEIGHT: 179 LBS

## 2025-08-14 DIAGNOSIS — Z02.6 ENCOUNTER RELATED TO WORKER'S COMPENSATION CLAIM: Primary | ICD-10-CM

## 2025-08-14 DIAGNOSIS — S16.1XXD CERVICAL MYOFASCIAL STRAIN, SUBSEQUENT ENCOUNTER: ICD-10-CM
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
## 2025-08-14 PROCEDURE — 99214 OFFICE O/P EST MOD 30 MIN: CPT | Mod: S$GLB,,, | Performed by: SURGERY

## 2025-08-19 ENCOUNTER — TELEPHONE (OUTPATIENT)
Dept: URGENT CARE | Facility: CLINIC | Age: 60
End: 2025-08-19
Payer: COMMERCIAL

## 2025-08-19 DIAGNOSIS — S16.1XXD CERVICAL MYOFASCIAL STRAIN, SUBSEQUENT ENCOUNTER: ICD-10-CM
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
## 2025-08-19 DIAGNOSIS — Z02.6 ENCOUNTER RELATED TO WORKER'S COMPENSATION CLAIM: Primary | ICD-10-CM

## 2025-08-28 ENCOUNTER — TELEPHONE (OUTPATIENT)
Dept: URGENT CARE | Facility: CLINIC | Age: 60
End: 2025-08-28
Payer: COMMERCIAL

## 2025-08-28 DIAGNOSIS — Z02.6 ENCOUNTER RELATED TO WORKER'S COMPENSATION CLAIM: Primary | ICD-10-CM

## 2025-08-28 DIAGNOSIS — S16.1XXD CERVICAL MYOFASCIAL STRAIN, SUBSEQUENT ENCOUNTER: ICD-10-CM
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
## 2025-08-29 ENCOUNTER — OFFICE VISIT (OUTPATIENT)
Dept: URGENT CARE | Facility: CLINIC | Age: 60
End: 2025-08-29
Payer: COMMERCIAL

## 2025-08-29 ENCOUNTER — CLINICAL SUPPORT (OUTPATIENT)
Dept: REHABILITATION | Facility: HOSPITAL | Age: 60
End: 2025-08-29
Payer: COMMERCIAL

## 2025-08-29 DIAGNOSIS — R29.898 DECREASED ROM OF NECK: Primary | ICD-10-CM
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
## 2025-08-29 DIAGNOSIS — S16.1XXD CERVICAL MYOFASCIAL STRAIN, SUBSEQUENT ENCOUNTER: Primary | ICD-10-CM

## 2025-08-29 DIAGNOSIS — Z02.6 ENCOUNTER RELATED TO WORKER'S COMPENSATION CLAIM: ICD-10-CM

## 2025-08-29 PROCEDURE — 97530 THERAPEUTIC ACTIVITIES: CPT

## 2025-09-03 RX ORDER — FLUCONAZOLE 150 MG/1
TABLET ORAL
Qty: 2 TABLET | Refills: 0 | Status: SHIPPED | OUTPATIENT
Start: 2025-09-03

## 2025-09-05 ENCOUNTER — CLINICAL SUPPORT (OUTPATIENT)
Dept: REHABILITATION | Facility: HOSPITAL | Age: 60
End: 2025-09-05
Payer: COMMERCIAL

## 2025-09-05 DIAGNOSIS — R29.898 DECREASED ROM OF NECK: Primary | ICD-10-CM

## 2025-09-05 PROCEDURE — 97530 THERAPEUTIC ACTIVITIES: CPT | Mod: CQ
